# Patient Record
Sex: FEMALE | Race: BLACK OR AFRICAN AMERICAN | NOT HISPANIC OR LATINO | Employment: OTHER | URBAN - METROPOLITAN AREA
[De-identification: names, ages, dates, MRNs, and addresses within clinical notes are randomized per-mention and may not be internally consistent; named-entity substitution may affect disease eponyms.]

---

## 2017-12-30 ENCOUNTER — HOSPITAL ENCOUNTER (EMERGENCY)
Facility: HOSPITAL | Age: 62
Discharge: HOME | End: 2017-12-30
Attending: EMERGENCY MEDICINE
Payer: MEDICAID

## 2017-12-30 VITALS
OXYGEN SATURATION: 97 % | HEIGHT: 66 IN | BODY MASS INDEX: 22.34 KG/M2 | HEART RATE: 84 BPM | RESPIRATION RATE: 16 BRPM | SYSTOLIC BLOOD PRESSURE: 190 MMHG | DIASTOLIC BLOOD PRESSURE: 85 MMHG | WEIGHT: 139 LBS | TEMPERATURE: 99 F

## 2017-12-30 DIAGNOSIS — J44.1 COPD EXACERBATION: Primary | ICD-10-CM

## 2017-12-30 DIAGNOSIS — J06.9 UPPER RESPIRATORY TRACT INFECTION, UNSPECIFIED TYPE: ICD-10-CM

## 2017-12-30 LAB
FLUAV AG SPEC QL IA: NEGATIVE
FLUBV AG SPEC QL IA: NEGATIVE
SPECIMEN SOURCE: NORMAL

## 2017-12-30 PROCEDURE — 25000003 PHARM REV CODE 250: Performed by: EMERGENCY MEDICINE

## 2017-12-30 PROCEDURE — 87400 INFLUENZA A/B EACH AG IA: CPT

## 2017-12-30 PROCEDURE — 94640 AIRWAY INHALATION TREATMENT: CPT

## 2017-12-30 PROCEDURE — 25000242 PHARM REV CODE 250 ALT 637 W/ HCPCS: Performed by: EMERGENCY MEDICINE

## 2017-12-30 PROCEDURE — 99284 EMERGENCY DEPT VISIT MOD MDM: CPT | Mod: 25

## 2017-12-30 PROCEDURE — 63600175 PHARM REV CODE 636 W HCPCS: Performed by: EMERGENCY MEDICINE

## 2017-12-30 RX ORDER — ALBUTEROL SULFATE 0.83 MG/ML
2.5 SOLUTION RESPIRATORY (INHALATION)
Status: COMPLETED | OUTPATIENT
Start: 2017-12-30 | End: 2017-12-30

## 2017-12-30 RX ORDER — PREDNISONE 20 MG/1
60 TABLET ORAL DAILY
Qty: 12 TABLET | Refills: 0 | Status: SHIPPED | OUTPATIENT
Start: 2017-12-30 | End: 2018-01-03

## 2017-12-30 RX ORDER — AMLODIPINE BESYLATE 5 MG/1
10 TABLET ORAL
Status: COMPLETED | OUTPATIENT
Start: 2017-12-30 | End: 2017-12-30

## 2017-12-30 RX ORDER — AMLODIPINE BESYLATE 10 MG/1
10 TABLET ORAL DAILY
Qty: 30 TABLET | Refills: 0 | Status: SHIPPED | OUTPATIENT
Start: 2017-12-30 | End: 2018-10-27

## 2017-12-30 RX ORDER — AZITHROMYCIN 250 MG/1
250 TABLET, FILM COATED ORAL DAILY
Qty: 6 TABLET | Refills: 0 | Status: SHIPPED | OUTPATIENT
Start: 2017-12-30 | End: 2018-10-27

## 2017-12-30 RX ORDER — PREDNISONE 20 MG/1
60 TABLET ORAL
Status: COMPLETED | OUTPATIENT
Start: 2017-12-30 | End: 2017-12-30

## 2017-12-30 RX ORDER — ALBUTEROL SULFATE 90 UG/1
2 AEROSOL, METERED RESPIRATORY (INHALATION) EVERY 4 HOURS PRN
Qty: 1 INHALER | Refills: 0 | Status: SHIPPED | OUTPATIENT
Start: 2017-12-30 | End: 2018-01-15 | Stop reason: SDUPTHER

## 2017-12-30 RX ORDER — DIPHENHYDRAMINE HCL 50 MG
50 CAPSULE ORAL
Status: COMPLETED | OUTPATIENT
Start: 2017-12-30 | End: 2017-12-30

## 2017-12-30 RX ADMIN — PREDNISONE 60 MG: 20 TABLET ORAL at 04:12

## 2017-12-30 RX ADMIN — ALBUTEROL SULFATE 2.5 MG: 2.5 SOLUTION RESPIRATORY (INHALATION) at 04:12

## 2017-12-30 RX ADMIN — DIPHENHYDRAMINE HYDROCHLORIDE 50 MG: 50 CAPSULE ORAL at 04:12

## 2017-12-30 RX ADMIN — AMLODIPINE BESYLATE 10 MG: 5 TABLET ORAL at 05:12

## 2017-12-30 NOTE — ED PROVIDER NOTES
Encounter Date: 12/30/2017    SCRIBE #1 NOTE: I, Dale Yo, am scribing for, and in the presence of,  Dr. Jb Castro. I have scribed the entire note.       History     Chief Complaint   Patient presents with    Cough     coughing, body aches, congestion, and chills for 4 days.     This is a 62 y.o. female who has no past medical history on file.   The patient presents to the Emergency Department with cough and rhinorrhea.   Symptoms are associated with sore upper back.  Pt has positive sick contact  Symptoms are not relieved by dayquil or nyquil, or any other pain medicine.  Pt smokes 2 ppd.    Pt has a past surgical history that includes Hysterectomy and Wrist fracture surgery (Bilateral).        The history is provided by the patient.     Review of patient's allergies indicates:  No Known Allergies  History reviewed. No pertinent past medical history.  Past Surgical History:   Procedure Laterality Date    HYSTERECTOMY      WRIST FRACTURE SURGERY Bilateral      History reviewed. No pertinent family history.  Social History   Substance Use Topics    Smoking status: Current Every Day Smoker    Smokeless tobacco: Current User    Alcohol use No     Review of Systems   Constitutional: Negative for fever.   HENT: Positive for rhinorrhea. Negative for sore throat.    Respiratory: Positive for cough and shortness of breath.    Cardiovascular: Negative for chest pain.   Gastrointestinal: Negative for nausea.   Genitourinary: Negative for dysuria.   Musculoskeletal: Positive for back pain (upper).   Skin: Negative for rash.   Neurological: Negative for weakness.   Hematological: Does not bruise/bleed easily.       Physical Exam     Initial Vitals [12/30/17 1403]   BP Pulse Resp Temp SpO2   (!) 187/91 80 16 98.6 °F (37 °C) 96 %      MAP       123         Physical Exam    Nursing note and vitals reviewed.  Constitutional: She appears well-developed and well-nourished. She is not diaphoretic. No distress.   HENT:    Head: Normocephalic and atraumatic.   Mouth/Throat: Oropharynx is clear and moist.   Pharyngeal erythema no exudates.   Eyes: Pupils are equal, round, and reactive to light.   Neck: Normal range of motion. Neck supple.   Cardiovascular: Normal rate, regular rhythm, normal heart sounds and intact distal pulses.   No murmur heard.  Pulmonary/Chest: No respiratory distress. She has wheezes. She has no rhonchi. She has no rales.   Abdominal: She exhibits no distension.   Musculoskeletal: Normal range of motion.   Neurological: She is alert and oriented to person, place, and time.   Skin: Skin is warm and dry. Capillary refill takes less than 2 seconds.   Psychiatric: She has a normal mood and affect.         ED Course   Procedures  Labs Reviewed   INFLUENZA A AND B ANTIGEN         X-Ray Chest PA And Lateral   Final Result     See above         Electronically signed by: BJ ROBERTS DO   Date:     12/30/17   Time:    16:55                Medical Decision Making:   Clinical Tests:   Lab Tests: Ordered and Reviewed  Radiological Study: Ordered and Reviewed  ED Management:  Copd exacerbation, will place pt on prednisone, z-pack, albuterol MDI  No Evidence of pneumonia, pneumothorax, pleural effusion on chest x-ray.  I doubt that the patient has acute bacterial sinusitis or otitis media as well.      Clinical impression and plan discussed with patient.    Pt is to call for follow up with PCP in 7 days.  Pt to return to the ED for any new or concerning symptoms.  Aftercare instructions and return precautions provided to patient.   Pt expressed understanding and agrees with plan.                     ED Course as of Dec 30 1606   Sat Dec 30, 2017   1605 Influenza A Ag, EIA: Negative [NP]   1605 Influenza B Ag, EIA: Negative [NP]      ED Course User Index  [NP] Jb Castro MD     Clinical Impression:   The primary encounter diagnosis was COPD exacerbation. A diagnosis of Upper respiratory tract infection, unspecified type  was also pertinent to this visit.          I, Dr. Jb Castro, personally performed the services described in this documentation.   All medical record entries made by the scribe were at my direction and in my presence.   I have reviewed the chart and agree that the record is accurate and complete.   Jb Castro MD.                         Jb Castro MD  01/09/18 0753

## 2018-10-27 PROBLEM — I16.1 HYPERTENSIVE EMERGENCY: Status: ACTIVE | Noted: 2018-10-27

## 2018-10-27 PROBLEM — Z72.0 TOBACCO USE: Status: ACTIVE | Noted: 2018-10-27

## 2018-10-27 PROBLEM — R10.9 ABDOMINAL PAIN: Status: ACTIVE | Noted: 2018-10-27

## 2018-10-27 PROBLEM — N13.30 HYDRONEPHROSIS: Status: ACTIVE | Noted: 2018-10-27

## 2018-10-27 PROBLEM — R79.89 ELEVATED TROPONIN: Status: ACTIVE | Noted: 2018-10-27

## 2018-10-27 PROBLEM — N18.2 ACUTE RENAL FAILURE SUPERIMPOSED ON STAGE 2 CHRONIC KIDNEY DISEASE: Status: ACTIVE | Noted: 2018-10-27

## 2018-10-27 PROBLEM — N17.9 ACUTE RENAL FAILURE SUPERIMPOSED ON STAGE 2 CHRONIC KIDNEY DISEASE: Status: ACTIVE | Noted: 2018-10-27

## 2018-10-29 PROBLEM — R10.9 ABDOMINAL PAIN: Status: RESOLVED | Noted: 2018-10-27 | Resolved: 2018-10-29

## 2018-10-29 PROBLEM — N18.30 STAGE 3 CHRONIC KIDNEY DISEASE: Status: ACTIVE | Noted: 2018-10-27

## 2018-10-29 PROBLEM — I10 ESSENTIAL HYPERTENSION: Status: ACTIVE | Noted: 2018-10-29

## 2018-10-29 PROBLEM — I16.1 HYPERTENSIVE EMERGENCY: Status: RESOLVED | Noted: 2018-10-27 | Resolved: 2018-10-29

## 2019-01-02 DIAGNOSIS — M25.552 LEFT HIP PAIN: Primary | ICD-10-CM

## 2019-02-04 ENCOUNTER — TELEPHONE (OUTPATIENT)
Dept: GASTROENTEROLOGY | Facility: CLINIC | Age: 64
End: 2019-02-04

## 2019-02-06 ENCOUNTER — TELEPHONE (OUTPATIENT)
Dept: GASTROENTEROLOGY | Facility: CLINIC | Age: 64
End: 2019-02-06

## 2019-02-06 NOTE — TELEPHONE ENCOUNTER
Second attempt to contact patient in regards to scheduling a colonoscopy. Left message to call back.

## 2019-05-23 ENCOUNTER — TELEPHONE (OUTPATIENT)
Dept: GASTROENTEROLOGY | Facility: CLINIC | Age: 64
End: 2019-05-23

## 2019-05-23 NOTE — TELEPHONE ENCOUNTER
[facility]   [address] [place] [address]  [phone]    Date : 5/23/19    Dear : Briseyda Rojas     An order for the following procedure(s) colonoscopy was placed for you by Yung    Please call the number listed to schedule this procedure or cancel the order.    If you have already scheduled this appointment, please disregard this letter.      Sincerely,    [facility]  [phone]

## 2019-09-25 ENCOUNTER — TELEPHONE (OUTPATIENT)
Dept: RADIOLOGY | Facility: HOSPITAL | Age: 64
End: 2019-09-25

## 2019-09-25 NOTE — TELEPHONE ENCOUNTER
Spoke with patient and her sister Tonia. Reviewed breast biopsy procedure and reviewed instructions for breast biopsy. Patient's sister Tonia expressed understanding and all questions were answered. Provided patient's sister with my phone number to call for any further concerns or questions.   Patient scheduled breast biopsy at the [facility] on 10/4/2019.

## 2019-09-25 NOTE — TELEPHONE ENCOUNTER
----- Message from Ying Chapin sent at 9/25/2019  2:03 PM CDT -----  Contact: Pt  Pt was returning a phone call. Please call pt back at [phone]

## 2019-09-25 NOTE — TELEPHONE ENCOUNTER
Called patient's sister Tonia as requested in reference to her breast imaging and scheduling a breast biopsy. No answer. Left the patient's sister a message with my direct phone number.

## 2019-09-25 NOTE — TELEPHONE ENCOUNTER
----- Message from Natasha Ernst RT sent at 9/25/2019  7:52 AM CDT -----  Regarding: PATIENT FOR BIOPSY  Arpit,    Good morning.  Ms. Rojas was recommended by Dr Librado Barnes to have some areas of right breast biopsied. One biopsy by ultrasound of a lymph node and possible 2 stereotactic biopsies.  He consulted with one of the radiologist at [facility].  Her provider faxed a referral to [facility]. Please look at that and see if the is sufficient for an order.  Please let me know if you need something different.  Her sister would like to be contacted to set up [employer] [facility] [place] at [phone].     Thanks.  Natasha

## 2019-10-04 ENCOUNTER — HOSPITAL ENCOUNTER (OUTPATIENT)
Dept: RADIOLOGY | Facility: HOSPITAL | Age: 64
Discharge: HOME | End: 2019-10-04
Attending: NURSE PRACTITIONER
Payer: MEDICAID

## 2019-10-04 DIAGNOSIS — R92.8 ABNORMAL FINDING ON BREAST IMAGING: ICD-10-CM

## 2019-10-04 DIAGNOSIS — N63.0 BREAST MASS: Primary | ICD-10-CM

## 2019-10-04 PROCEDURE — 19082 BX BREAST ADD LESION STRTCTC: CPT | Mod: PO

## 2019-10-04 PROCEDURE — 38505 NEEDLE BIOPSY LYMPH NODES: CPT | Mod: 51,RT,, | Performed by: RADIOLOGY

## 2019-10-04 PROCEDURE — 19081 BX BREAST 1ST LESION STRTCTC: CPT | Mod: PO,RT

## 2019-10-04 PROCEDURE — 27201068 US BREAST BIOPSY WITH IMAGING 1ST SITE RIGHT: Mod: PO

## 2019-10-04 PROCEDURE — 38505 PR NEEDLE BIOPSY, LYMPH 2DE(S): ICD-10-PCS | Mod: 51,RT,, | Performed by: RADIOLOGY

## 2019-10-04 PROCEDURE — 19081 MAMMO BREAST STEREOTACTIC BREAST BIOPSY RIGHT: ICD-10-PCS | Mod: RT,,, | Performed by: RADIOLOGY

## 2019-10-04 PROCEDURE — 88342 IMHCHEM/IMCYTCHM 1ST ANTB: CPT | Mod: 26,59,, | Performed by: PATHOLOGY

## 2019-10-04 PROCEDURE — 88341 IMHCHEM/IMCYTCHM EA ADD ANTB: CPT | Mod: 26,59,, | Performed by: PATHOLOGY

## 2019-10-04 PROCEDURE — 88360 TUMOR IMMUNOHISTOCHEM/MANUAL: CPT | Mod: 26,,, | Performed by: PATHOLOGY

## 2019-10-04 PROCEDURE — 25000003 PHARM REV CODE 250: Mod: PO | Performed by: NURSE PRACTITIONER

## 2019-10-04 PROCEDURE — 88305 TISSUE SPECIMEN TO PATHOLOGY, RADIOLOGY: ICD-10-PCS | Mod: 26,,, | Performed by: PATHOLOGY

## 2019-10-04 PROCEDURE — 19081 BX BREAST 1ST LESION STRTCTC: CPT | Mod: RT,,, | Performed by: RADIOLOGY

## 2019-10-04 PROCEDURE — 88360 TUMOR IMMUNOHISTOCHEM/MANUAL: CPT | Mod: 59 | Performed by: PATHOLOGY

## 2019-10-04 PROCEDURE — 19083 US BREAST BIOPSY WITH IMAGING 1ST SITE RIGHT: ICD-10-PCS | Mod: RT,,, | Performed by: RADIOLOGY

## 2019-10-04 PROCEDURE — 88341 TISSUE SPECIMEN TO PATHOLOGY, RADIOLOGY: ICD-10-PCS | Mod: 26,59,, | Performed by: PATHOLOGY

## 2019-10-04 PROCEDURE — 88305 TISSUE EXAM BY PATHOLOGIST: CPT | Mod: 26,,, | Performed by: PATHOLOGY

## 2019-10-04 PROCEDURE — 19083 BX BREAST 1ST LESION US IMAG: CPT | Mod: RT,,, | Performed by: RADIOLOGY

## 2019-10-04 PROCEDURE — 88360 TISSUE SPECIMEN TO PATHOLOGY, RADIOLOGY: ICD-10-PCS | Mod: 26,,, | Performed by: PATHOLOGY

## 2019-10-04 PROCEDURE — 88342 TISSUE SPECIMEN TO PATHOLOGY, RADIOLOGY: ICD-10-PCS | Mod: 26,59,, | Performed by: PATHOLOGY

## 2019-10-04 RX ORDER — LIDOCAINE HYDROCHLORIDE AND EPINEPHRINE 20; 10 MG/ML; UG/ML
20 INJECTION, SOLUTION INFILTRATION; PERINEURAL ONCE
Status: COMPLETED | OUTPATIENT
Start: 2019-10-04 | End: 2019-10-04

## 2019-10-04 RX ORDER — LIDOCAINE HYDROCHLORIDE 10 MG/ML
2 INJECTION, SOLUTION EPIDURAL; INFILTRATION; INTRACAUDAL; PERINEURAL ONCE
Status: COMPLETED | OUTPATIENT
Start: 2019-10-04 | End: 2019-10-04

## 2019-10-04 RX ORDER — LIDOCAINE HYDROCHLORIDE 10 MG/ML
2.5 INJECTION, SOLUTION EPIDURAL; INFILTRATION; INTRACAUDAL; PERINEURAL ONCE
Status: DISCONTINUED | OUTPATIENT
Start: 2019-10-04 | End: 2019-10-05 | Stop reason: HOSPADM

## 2019-10-04 RX ORDER — LIDOCAINE HYDROCHLORIDE 10 MG/ML
3 INJECTION, SOLUTION EPIDURAL; INFILTRATION; INTRACAUDAL; PERINEURAL ONCE
Status: COMPLETED | OUTPATIENT
Start: 2019-10-04 | End: 2019-10-04

## 2019-10-04 RX ADMIN — LIDOCAINE HYDROCHLORIDE 30 MG: 10 INJECTION, SOLUTION EPIDURAL; INFILTRATION; INTRACAUDAL; PERINEURAL at 03:10

## 2019-10-04 RX ADMIN — LIDOCAINE HYDROCHLORIDE AND EPINEPHRINE 20 ML: 20; 10 INJECTION, SOLUTION INFILTRATION; PERINEURAL at 02:10

## 2019-10-04 RX ADMIN — LIDOCAINE HYDROCHLORIDE AND EPINEPHRINE 20 ML: 20; 10 INJECTION, SOLUTION INFILTRATION; PERINEURAL at 03:10

## 2019-10-04 RX ADMIN — LIDOCAINE HYDROCHLORIDE 20 MG: 10 INJECTION, SOLUTION EPIDURAL; INFILTRATION; INTRACAUDAL; PERINEURAL at 02:10

## 2019-10-10 ENCOUNTER — TELEPHONE (OUTPATIENT)
Dept: SURGERY | Facility: CLINIC | Age: 64
End: 2019-10-10

## 2019-10-10 NOTE — TELEPHONE ENCOUNTER
MA returned call, pt was notified of diagnoses by NP. Asked if patient was scheduled to see a surgeon, MA was unsure. Offered to schedule appt for pt. MA stated she would discuss with NP and return my call

## 2019-10-15 ENCOUNTER — TELEPHONE (OUTPATIENT)
Dept: SURGERY | Facility: CLINIC | Age: 64
End: 2019-10-15

## 2019-10-15 ENCOUNTER — OFFICE VISIT (OUTPATIENT)
Dept: SURGERY | Facility: CLINIC | Age: 64
End: 2019-10-15
Payer: MEDICAID

## 2019-10-15 VITALS
SYSTOLIC BLOOD PRESSURE: 128 MMHG | WEIGHT: 130.31 LBS | HEART RATE: 65 BPM | TEMPERATURE: 99 F | DIASTOLIC BLOOD PRESSURE: 70 MMHG | OXYGEN SATURATION: 99 % | BODY MASS INDEX: 20.94 KG/M2 | HEIGHT: 66 IN

## 2019-10-15 DIAGNOSIS — Z17.1 MALIGNANT NEOPLASM OF RIGHT BREAST IN FEMALE, ESTROGEN RECEPTOR NEGATIVE, UNSPECIFIED SITE OF BREAST: Primary | ICD-10-CM

## 2019-10-15 DIAGNOSIS — C50.911 MALIGNANT NEOPLASM OF RIGHT BREAST IN FEMALE, ESTROGEN RECEPTOR NEGATIVE, UNSPECIFIED SITE OF BREAST: Primary | ICD-10-CM

## 2019-10-15 PROCEDURE — 99204 PR OFFICE/OUTPT VISIT, NEW, LEVL IV, 45-59 MIN: ICD-10-PCS | Mod: S$PBB,,, | Performed by: SURGERY

## 2019-10-15 PROCEDURE — 99999 PR PBB SHADOW E&M-EST. PATIENT-LVL III: CPT | Mod: PBBFAC,,, | Performed by: SURGERY

## 2019-10-15 PROCEDURE — 99213 OFFICE O/P EST LOW 20 MIN: CPT | Mod: PBBFAC,PN | Performed by: SURGERY

## 2019-10-15 PROCEDURE — 99999 PR PBB SHADOW E&M-EST. PATIENT-LVL III: ICD-10-PCS | Mod: PBBFAC,,, | Performed by: SURGERY

## 2019-10-15 PROCEDURE — 99204 OFFICE O/P NEW MOD 45 MIN: CPT | Mod: S$PBB,,, | Performed by: SURGERY

## 2019-10-15 RX ORDER — PHENOL/SODIUM PHENOLATE
AEROSOL, SPRAY (ML) MUCOUS MEMBRANE
COMMUNITY
End: 2019-10-29 | Stop reason: ALTCHOICE

## 2019-10-15 RX ORDER — METHOCARBAMOL 750 MG/1
TABLET, FILM COATED ORAL
COMMUNITY
End: 2019-10-29 | Stop reason: ALTCHOICE

## 2019-10-15 RX ORDER — CLARITHROMYCIN 500 MG/1
TABLET, FILM COATED ORAL
COMMUNITY
End: 2019-10-29 | Stop reason: ALTCHOICE

## 2019-10-15 RX ORDER — AMOXICILLIN 500 MG/1
TABLET, FILM COATED ORAL
COMMUNITY
End: 2019-10-29

## 2019-10-15 RX ORDER — KETOROLAC TROMETHAMINE 10 MG/1
10 TABLET, FILM COATED ORAL EVERY 6 HOURS PRN
Qty: 20 TABLET | Refills: 0 | Status: SHIPPED | OUTPATIENT
Start: 2019-10-15 | End: 2019-10-20

## 2019-10-15 RX ORDER — IBUPROFEN 800 MG/1
TABLET ORAL
COMMUNITY
End: 2019-10-29

## 2019-10-15 RX ORDER — METHYLPREDNISOLONE 4 MG/1
TABLET ORAL
COMMUNITY
End: 2019-10-29 | Stop reason: ALTCHOICE

## 2019-10-15 NOTE — LETTER
October 15, 2019      [place]  [address] [facility] [address]           [facility]  [address], [facility] [address]  [facility] [address]  Phone: [phone]  Fax: [phone]          Patient: Briseyda Rojas   MR Number: [id number]   YOB: 1955   Date of Visit: 10/15/2019       Dear Adina Wyatt:    Thank you for referring Briseyda Rojas to me for evaluation. Attached you will find relevant portions of my assessment and plan of care.    If you have questions, please do not hesitate to call me. I look forward to following Briseyda Rojas along with you.    Sincerely,    Karig Kahn Jr., MD    Enclosure  CC:  No Recipients    If you would like to receive this communication electronically, please contact [email or web address] or [phone] to request more information on A2B Link access.    For providers and/or their staff who would like to refer a patient to [facility], please contact us through our one-stop-shop provider referral line, [facility] , at [phone].    If you feel you have received this communication in error or would no longer like to receive these types of communications, please e-mail [email or web address]

## 2019-10-15 NOTE — TELEPHONE ENCOUNTER
----- Message from Danay Lovett sent at 10/15/2019  2:39 PM CDT -----  Contact:  Torin - [phone]  Needs a call back about his wife medication he do not know the name. Please advise           10/15/2019        1646  Contacted Mr. Rojas regarding the above message. He stated that he had already received the information he needed. Patient's  verbalized understanding.

## 2019-10-15 NOTE — H&P
"[facility]  OUTPATIENT H&P    REASON FOR VISIT/CC:  Right breast cancer    HPI: Briseyda Rojas is a 63 y.o. female who presents for evaluation of recently diagnosed right breast cancer.  She underwent mammography and ultrasound in early September of this year which showed architectural distortion in the right upper lateral breast and pleomorphic calcifications in the right upper mid breast.  Ultrasound of the axilla was also performed which showed a prominent lymph node.  She underwent biopsy of all 3 lesions. The right upper lateral lesion returned as DCIS with comedo necrosis.  The right upper mid breast came back as normal but had discoordination between imaging.  The right axillary lymph node returned as intraductal carcinoma, ER negative, HI negative, HER2 equivocal with fish pending.    Patient complains of soreness and pain in the right axilla at the area of biopsy.  Denies fevers chills.    History of previous right breast biopsy for a "cyst" per the patient. Reports she has undergone previous mammography which were all negative however no recent imaging is available in epic or scanned into the media tab.    Patient reports strong family history of breast cancer the details are uncertain, she reports her grandmother (unknown uncertain if maternal or paternal), mother and father all had breast cancer.  Uncertain of the ages but all were above 50.  All are since .  Denies history of ovarian cancer.  Has 2 sons, no daughters.    I have reviewed the patient's chart including prior progress notes, procedures and testing. The patient has hypertension and hyperlipidemia.    ROS:   Review of Systems   Constitutional: Negative for activity change, chills and fever.   HENT: Negative for congestion, nosebleeds and trouble swallowing.    Eyes: Negative for photophobia, discharge and visual disturbance.   Respiratory: Negative for apnea, chest tightness and shortness of breath.    Cardiovascular: " Negative for chest pain, palpitations and leg swelling.   Gastrointestinal: Negative for abdominal distention, abdominal pain, nausea and vomiting.   Genitourinary: Negative for difficulty urinating, dysuria and hematuria.   Musculoskeletal: Negative for arthralgias, gait problem, neck pain and neck stiffness.   Skin: Negative for color change, pallor, rash and wound.        Pain in the right axilla and right lateral breast   Neurological: Negative for seizures, syncope and light-headedness.   Psychiatric/Behavioral: Negative for agitation, behavioral problems and confusion.       PROBLEM LIST:  Patient Active Problem List   Diagnosis    Elevated troponin    Hydronephrosis    Tobacco use    Stage 3 chronic kidney disease    Essential hypertension         HISTORY  Past Medical History:   Diagnosis Date    Hypertension        Past Surgical History:   Procedure Laterality Date    HYSTERECTOMY      WRIST FRACTURE SURGERY Bilateral        Social History     Tobacco Use    Smoking status: Current Every Day Smoker     Packs/day: 1.00    Smokeless tobacco: Current User   Substance Use Topics    Alcohol use: No    Drug use: No       Family History   Problem Relation Age of Onset    Ovarian cancer Mother     Breast cancer Maternal Grandmother          MEDS:  Current Outpatient Medications on File Prior to Visit   Medication Sig Dispense Refill    amoxicillin (AMOXIL) 500 MG Tab amoxicillin 500 mg tablet   Take 2 tablets twice a day by oral route for 14 days.      aspirin 81 MG Chew Take 1 tablet (81 mg total) by mouth once daily.  0    atorvastatin (LIPITOR) 40 MG tablet Take 1 tablet (40 mg total) by mouth every evening. 30 tablet 1    carvedilol (COREG) 12.5 MG tablet Take 1 tablet (12.5 mg total) by mouth 2 (two) times daily. 60 tablet 1    clarithromycin (BIAXIN) 500 MG tablet clarithromycin 500 mg tablet   Take 1 tablet every 12 hours by oral route for 14 days.      ibuprofen (ADVIL,MOTRIN) 800 MG  tablet ibuprofen 800 mg tablet   Take 1 tablet twice a day by oral route as needed.      lisinopril-hydrochlorothiazide (PRINZIDE,ZESTORETIC) 20-25 mg Tab Take 1 tablet by mouth once daily. 30 tablet 1    methocarbamol (ROBAXIN) 750 MG Tab methocarbamol 750 mg tablet   Take 1 tablet 3 times a day by oral route as needed.      methylPREDNISolone (MEDROL, CARLOS,) 4 mg tablet Medrol (Carlos) 4 mg tablets in a dose pack   as directed      omeprazole 20 mg TbEC omeprazole 20 mg tablet,delayed release   Take 1 tablet twice a day by oral route for 14 days.      diclofenac (VOLTAREN) 25 MG TbEC Take 1 tablet (25 mg total) by mouth 3 (three) times daily. (Patient not taking: Reported on 10/15/2019) 15 tablet 0    HYDROcodone-acetaminophen (NORCO) 5-325 mg per tablet Take 1 tablet by mouth every 4 (four) hours as needed for Pain. (Patient not taking: Reported on 10/15/2019) 18 tablet 0     No current facility-administered medications on file prior to visit.        ALLERGIES:  Review of patient's allergies indicates:  No Known Allergies      VITALS:  Vitals:    10/15/19 1324   BP: 128/70   Pulse: 65   Temp: 98.5 °F (36.9 °C)         PHYSICAL EXAM:  Physical Exam   Constitutional: She is oriented to person, place, and time. She appears well-developed and well-nourished. No distress.   HENT:   Head: Normocephalic and atraumatic.   Nose: Nose normal.   Eyes: Conjunctivae and EOM are normal. No scleral icterus.   Neck: Normal range of motion. Neck supple. No tracheal tenderness present. No tracheal deviation present.   Cardiovascular: Normal rate, regular rhythm and intact distal pulses.   Pulmonary/Chest: Effort normal and breath sounds normal. No accessory muscle usage or stridor. No respiratory distress. She exhibits no mass, no edema and no deformity. Right breast exhibits tenderness. Right breast exhibits no inverted nipple, no mass, no nipple discharge and no skin change. Left breast exhibits no inverted nipple, no mass, no  nipple discharge and no skin change.       Abdominal: Soft. Normal appearance. She exhibits no distension, no ascites and no mass. There is no tenderness. There is no rebound. No hernia.   Musculoskeletal: Normal range of motion. She exhibits no edema or deformity.   Lymphadenopathy:        Right cervical: No superficial cervical adenopathy present.       Left cervical: No superficial cervical adenopathy present.     She has axillary adenopathy.        Right axillary: Pectoral adenopathy present.        Left axillary: No pectoral and no lateral adenopathy present.       Right: No supraclavicular adenopathy present.        Left: No supraclavicular adenopathy present.   Neurological: She is alert and oriented to person, place, and time. She exhibits normal muscle tone.   Skin: Skin is warm and dry. No rash noted. She is not diaphoretic. No erythema.   Psychiatric: She has a normal mood and affect. Her behavior is normal. Judgment and thought content normal.   Vitals reviewed.        LABS:  Lab Results   Component Value Date    WBC 6.27 09/06/2019    RBC 4.51 09/06/2019    HGB 12.8 09/06/2019    HCT 39.3 09/06/2019     09/06/2019     Lab Results   Component Value Date     (H) 09/06/2019     09/06/2019    K 3.7 09/06/2019     09/06/2019    CO2 28 09/06/2019    BUN 23 (H) 09/06/2019    CREATININE 1.14 09/06/2019    CALCIUM 9.3 09/06/2019     Lab Results   Component Value Date    ALT 22 09/06/2019    AST 26 09/06/2019    ALKPHOS 70 09/06/2019    BILITOT 0.3 09/06/2019     No results found for: MG, PHOS    STUDIES:  All images and reports were personally reviewed.      IMAGING:  Left  US Breast Bilateral Limited  There is a 5 mm complicated cyst seen in the left breast at 11 o'clock.      Right     Mammo Digital Diagnostic Bilat w/ Joaquin  There is 33 mm architectural distortion seen in the upper outer quadrant of the right breast, 46 cm from the nipple.      There is an area measuring at least 81  mm of fine pleomorphic calcifications seen in the upper medial and lateral regions of the right breast, 46 cm from the nipple.      There are no corresponding lymph nodes seen on this modality.      US Breast Bilateral Limited  There is a 67 mm x 41 mm x 72 mm lymph node seen in the right axilla.      There are no corresponding calcifications seen on this modality.      Impression:  Right  Calcifications: Right breast 81 mm calcifications at the upper position. Assessment: 5 - Highly suggestive of malignancy. Stereotactic/Baylee Biopsy is recommended.   Architectural Distortion: Right breast 33 mm architectural distortion at the upper outer position. Assessment: 5 - Highly suggestive of malignancy. Stereotactic/baylee Biopsy is recommended.   Lymph Node: Right axilla 67 mm x 41 mm x 72 mm lymph node. Assessment: 5 - Highly suggestive of malignancy. Ultrasound-guided biopsy is recommended.      Left  There is no mammographic or sonographic evidence of malignancy in the left breast.     BI-RADS Category:   Overall: 5 - Highly Suggestive of Malignancy      PATHOLOGY:  FINAL PATHOLOGIC DIAGNOSIS  1. Right breast, 12 o'clock calcifications, stereotactic biopsy:  Fibroadipose tissue with scant benign breast elements.  Microcalcifications are identified in association with benign breast tissue.  Negative for atypia or malignancy.    -  The pathology results are benign however disconcordant to the imaging findings. Please schedule with Breast Surgery.  Thank you.    2. Right breast, upper inner calcifications, stereotactic biopsy:  Ductal carcinoma in situ (DCIS), high nuclear grade, cribriform type with apocrine features and comedonecrosis.  Microcalcifications are identified in association with DCIS.  No invasive carcinoma is identified.  Comment: Multiple foci of high-grade DCIS are identified with the largest single continuous focus measuring 6 mm.  DCIS pulmonary receptor markers are in progress and will be reported in an  addendum.    -   The breast biopsy results are malignant and concordant.  Please schedule with breast surgery.    3. Right axillary lymph node, biopsy:  Invasive ductal carcinoma, likely metastatic, [place] histologic grade 3 (tubular formation: 3, nuclear  pleomorphism: 3, mitotic activity:    THE DIAGNOSIS REMAINS THE SAME. This supplemental is issued to report the results of biomarker/receptor  studies.  2. Right breast, upper inner calcifications  ER: Negative  NV: Negative  3. Right axillary lymph node  ER: Negative  NV: Negative  Her2: Equivocal (score 2)  Ket2ELBA: Pending  Ki-67: 40%, unfavorable    ASSESSMENT & PLAN:  63 y.o. female with right breast invasive ductal carcinoma  - clinically positive lymph node with biopsy-proven invasive ductal carcinoma  - 2 separate breast lesions on the right side, possibly multicentric disease  - will refer this case to [facility] to be discussed by multi disciplinary board to determine next best course of action  - anti-inflammatory to be called in to patient's pharmacy for postprocedural pain

## 2019-10-16 ENCOUNTER — TELEPHONE (OUTPATIENT)
Dept: SURGERY | Facility: CLINIC | Age: 64
End: 2019-10-16

## 2019-10-16 NOTE — TELEPHONE ENCOUNTER
Spoke with pt's , offered appt with Dr. Garcia tomorrow, they cannot make that appt. Scheduled for 10/22/2019 with Dr. Garcia. Reviewed location, time and date with . He verbalized understanding  ----- Message from Stephani Power MA sent at 10/16/2019 10:20 AM CDT -----      ----- Message -----  From: Sarai Jang MA  Sent: 10/15/2019   3:25 PM CDT  To: Stephani Power MA    Rt Breast Cancer  ----- Message -----  From: Stephani Power MA  Sent: 10/15/2019   2:12 PM CDT  To: Sarai Jang MA    What will she need to be seen for?    I'm happy to help,  Stephani MOELLER  ----- Message -----  From: Sarai Jang MA  Sent: 10/15/2019   1:53 PM CDT  To: [facility] Staff    Dr. Kahn will be sending Dr. Garcia information on this pt. Could you please assist in getting an appt for her?    Thanks In Advance      Sarai

## 2019-10-17 ENCOUNTER — TELEPHONE (OUTPATIENT)
Dept: SURGERY | Facility: CLINIC | Age: 64
End: 2019-10-17

## 2019-10-22 ENCOUNTER — TELEPHONE (OUTPATIENT)
Dept: SURGERY | Facility: CLINIC | Age: 64
End: 2019-10-22

## 2019-10-22 ENCOUNTER — OFFICE VISIT (OUTPATIENT)
Dept: SURGERY | Facility: CLINIC | Age: 64
End: 2019-10-22
Payer: MEDICAID

## 2019-10-22 ENCOUNTER — DOCUMENTATION ONLY (OUTPATIENT)
Dept: SURGERY | Facility: CLINIC | Age: 64
End: 2019-10-22

## 2019-10-22 VITALS
HEIGHT: 66 IN | DIASTOLIC BLOOD PRESSURE: 77 MMHG | BODY MASS INDEX: 21.44 KG/M2 | HEART RATE: 78 BPM | WEIGHT: 133.38 LBS | SYSTOLIC BLOOD PRESSURE: 154 MMHG

## 2019-10-22 VITALS
WEIGHT: 133.38 LBS | HEART RATE: 64 BPM | DIASTOLIC BLOOD PRESSURE: 77 MMHG | SYSTOLIC BLOOD PRESSURE: 154 MMHG | HEIGHT: 66 IN | BODY MASS INDEX: 21.44 KG/M2

## 2019-10-22 DIAGNOSIS — C50.611 CARCINOMA OF AXILLARY TAIL OF RIGHT BREAST IN FEMALE, ESTROGEN RECEPTOR NEGATIVE: ICD-10-CM

## 2019-10-22 DIAGNOSIS — Z17.1 MALIGNANT NEOPLASM OF RIGHT BREAST IN FEMALE, ESTROGEN RECEPTOR NEGATIVE, UNSPECIFIED SITE OF BREAST: Primary | ICD-10-CM

## 2019-10-22 DIAGNOSIS — C50.919 BREAST CANCER: Primary | ICD-10-CM

## 2019-10-22 DIAGNOSIS — Z17.1 CARCINOMA OF AXILLARY TAIL OF RIGHT BREAST IN FEMALE, ESTROGEN RECEPTOR NEGATIVE: ICD-10-CM

## 2019-10-22 DIAGNOSIS — C50.911 BREAST CANCER, RIGHT BREAST: ICD-10-CM

## 2019-10-22 DIAGNOSIS — C50.411 MALIGNANT NEOPLASM OF UPPER-OUTER QUADRANT OF RIGHT FEMALE BREAST, UNSPECIFIED ESTROGEN RECEPTOR STATUS: Primary | ICD-10-CM

## 2019-10-22 DIAGNOSIS — C50.911 MALIGNANT NEOPLASM OF RIGHT BREAST IN FEMALE, ESTROGEN RECEPTOR NEGATIVE, UNSPECIFIED SITE OF BREAST: Primary | ICD-10-CM

## 2019-10-22 PROCEDURE — 99205 PR OFFICE/OUTPT VISIT, NEW, LEVL V, 60-74 MIN: ICD-10-PCS | Mod: S$PBB,,, | Performed by: INTERNAL MEDICINE

## 2019-10-22 PROCEDURE — 99213 OFFICE O/P EST LOW 20 MIN: CPT | Mod: PBBFAC,27 | Performed by: SURGERY

## 2019-10-22 PROCEDURE — 99215 PR OFFICE/OUTPT VISIT, EST, LEVL V, 40-54 MIN: ICD-10-PCS | Mod: S$PBB,,, | Performed by: SURGERY

## 2019-10-22 PROCEDURE — 99215 OFFICE O/P EST HI 40 MIN: CPT | Mod: S$PBB,,, | Performed by: SURGERY

## 2019-10-22 PROCEDURE — 99999 PR PBB SHADOW E&M-EST. PATIENT-LVL III: ICD-10-PCS | Mod: PBBFAC,,, | Performed by: SURGERY

## 2019-10-22 PROCEDURE — 99205 OFFICE O/P NEW HI 60 MIN: CPT | Mod: S$PBB,,, | Performed by: INTERNAL MEDICINE

## 2019-10-22 PROCEDURE — 99213 OFFICE O/P EST LOW 20 MIN: CPT | Mod: PBBFAC | Performed by: INTERNAL MEDICINE

## 2019-10-22 PROCEDURE — 99999 PR PBB SHADOW E&M-EST. PATIENT-LVL III: ICD-10-PCS | Mod: PBBFAC,,, | Performed by: INTERNAL MEDICINE

## 2019-10-22 PROCEDURE — 99999 PR PBB SHADOW E&M-EST. PATIENT-LVL III: CPT | Mod: PBBFAC,,, | Performed by: SURGERY

## 2019-10-22 PROCEDURE — 99999 PR PBB SHADOW E&M-EST. PATIENT-LVL III: CPT | Mod: PBBFAC,,, | Performed by: INTERNAL MEDICINE

## 2019-10-22 RX ORDER — SODIUM CHLORIDE 9 MG/ML
INJECTION, SOLUTION INTRAVENOUS CONTINUOUS
Status: CANCELLED | OUTPATIENT
Start: 2019-10-22

## 2019-10-22 NOTE — LETTER
November 17, 2019      Kraig Kahn Jr., MD  [address] [place] [address]           [facility]  [address]  [facility] [address]  Phone: [phone]  Fax: [phone]          Patient: Briseyda Rojas   MR Number: [id number]   YOB: 1955   Date of Visit: 10/22/2019       Dear Dr. Kraig Kahn Jr.:    Thank you for referring Briseyda Rojas to me for evaluation. Attached you will find relevant portions of my assessment and plan of care.    If you have questions, please do not hesitate to call me. I look forward to following Briseyda Rojas along with you.    Sincerely,    Mireille Raya MD    Enclosure  CC:  No Recipients    If you would like to receive this communication electronically, please contact [email or web address] or [phone] to request more information on tabulate Link access.    For providers and/or their staff who would like to refer a patient to [facility], please contact us through our one-stop-shop provider referral line, [facility], at [phone].    If you feel you have received this communication in error or would no longer like to receive these types of communications, please e-mail [email or web address]

## 2019-10-22 NOTE — PROGRESS NOTES
Nurse Navigator Note:     Met with patient during her consult with Dr. Raya.  Patient and I reviewed the information she discussed with Dr. Raya, including treatment options, diagnosis, and future plans for workup. Patient and I went through the new patient binder, explained some of the information and why it is provided.     Also offered patient consults with our other specialty clinics: Dr. Patricia for gynecological health during treatment, our breast physical therapy department for pre-op and post-operative assessments, Dr. Monk for psychological support, and [facility] Justice for nutritional counseling. Explained to patient that all of these support services are completely optional. Discussed that physical therapy may call patient to offer pre-op appt, and what that appt would entail.     Patient was given a copy of her appointments, Dr. Raya's card, and my card. Encouraged her to call me if she has any questions or concerns or would like to schedule any additional appointments. Verbalized understanding of all information.     Spent a total of one hour with pt and family member. Explained that insurance will end on 10/31/2019. Instructed them both to go to the financial assistance area. Explained that they can file for financial assistance and there are people that can assist them with completing a medicaid application to avoid her insurance from lapsing. All numbers for financial assistance as well as medicaid transportation provided to pt and family member. I stressed that they must schedule transportation several days before the scheduled appt.   Pt and family member verbalized understanding. Genetic, blood work and MRI all scheduled for patient. All upcoming appts and where to go for financial assistance provided to pt. Pt asked for a social work, I explained that I would message social work and have them reach out to them.

## 2019-10-22 NOTE — TELEPHONE ENCOUNTER
Spoke with spouse in regards to patient's appointment today at 1100 with Dr. Garcia. Spouse states they were having car trouble, but should make it to the appointment. I offered to reschedule appointment for Thursday, but spouse insisted they would make it today.

## 2019-10-22 NOTE — PROGRESS NOTES
New Breast Cancer  History and Physical  [facility]  Department of Surgery    REFERRING PROVIDER: Kraig Kahn Jr., MD  [address] [place] [address]    CHIEF COMPLAINT: right breast cancer    Subjective:      Briseyda Rojas is a 63 y.o. postmenopausal female referred for evaluation of recently diagnosed carcinoma of the right breast. The patient was initially referred for surgical evaluation of an axillary lump on exam first noted several months ago.  She does report that is has gotten larger over time. 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    This is her first mammogram ever.        Patient does routinely do self breast exams.  Patient has noted a change on breast exam.  Patient denies nipple discharge. Patient denies to previous breast biopsy. Patient denies a personal history of breast cancer.    Diagnostic imagin2019  Left  US Breast Bilateral Limited  There is a 5 mm complicated cyst seen in the left breast at 11 o'clock.      Right     Mammo Digital Diagnostic Bilat w/ Joaquin  There is 33 mm architectural distortion seen in the upper outer quadrant of the right breast, 46 cm from the nipple.      There is an area measuring at least 81 mm of fine pleomorphic calcifications seen in the upper medial and lateral regions of the right breast, 46 cm from the nipple.      There are no corresponding lymph nodes seen on this modality.      US Breast Bilateral Limited  There is a 67 mm x 41 mm x 72 mm lymph node seen in the right axilla.  (     There are no corresponding calcifications seen on this modality.      Impression:  Right  Calcifications: Right breast 81 mm calcifications at the upper position. Assessment: 5 - Highly suggestive of malignancy. Stereotactic/Joaquin Biopsy is recommended.   Architectural Distortion: Right  breast 33 mm architectural distortion at the upper outer position. Assessment: 5 - Highly suggestive of malignancy. Stereotactic/baylee Biopsy is recommended.   Lymph Node: Right axilla 67 mm x 41 mm x 72 mm lymph node. Assessment: 5 - Highly suggestive of malignancy. Ultrasound-guided biopsy is recommended.      Left  There is no mammographic or sonographic evidence of malignancy in the left breast.     BI-RADS Category:   Overall: 5 - Highly Suggestive of Malignancy     Recommendation:  Stereotactic Biopsy is recommended.  Stereotactic Biopsy is recommended.  Ultrasound-guided biopsy is recommended.    Pathology:  FINAL PATHOLOGIC DIAGNOSIS  1. Right breast, 12 o'clock calcifications, stereotactic biopsy:  Fibroadipose tissue with scant benign breast elements.  Microcalcifications are identified in association with benign breast tissue.  Negative for atypia or malignancy.    -  The pathology results are benign however disconcordant to the imaging findings. Please schedule with Breast Surgery.  Thank you.    2. Right breast, upper inner calcifications, stereotactic biopsy:  Ductal carcinoma in situ (DCIS), high nuclear grade, cribriform type with apocrine features and comedonecrosis.  Microcalcifications are identified in association with DCIS.  No invasive carcinoma is identified.  Comment: Multiple foci of high-grade DCIS are identified with the largest single continuous focus measuring 6 mm.  DCIS pulmonary receptor markers are in progress and will be reported in an addendum.    -   The breast biopsy results are malignant and concordant.  Please schedule with breast surgery.    3. Right axillary lymph node, biopsy:  Invasive ductal carcinoma, likely metastatic, Herrera histologic grade 3 (tubular formation: 3, nuclear  pleomorphism: 3, mitotic activity:    Triple negative (FISH negative)    Has 2 sons.        FAMILY History:  Mother ovarian cancer?  MGM breast cancer  Father unknown cancer  Sister unknown  cancer  Brother lung cancer  Sister throat cancer    Past Medical History:   Diagnosis Date    Hypertension      Past Surgical History:   Procedure Laterality Date    HYSTERECTOMY      WRIST FRACTURE SURGERY Bilateral      Current Outpatient Medications on File Prior to Visit   Medication Sig Dispense Refill    amoxicillin (AMOXIL) 500 MG Tab amoxicillin 500 mg tablet   Take 2 tablets twice a day by oral route for 14 days.      aspirin 81 MG Chew Take 1 tablet (81 mg total) by mouth once daily.  0    carvedilol (COREG) 12.5 MG tablet Take 1 tablet (12.5 mg total) by mouth 2 (two) times daily. 60 tablet 1    clarithromycin (BIAXIN) 500 MG tablet clarithromycin 500 mg tablet   Take 1 tablet every 12 hours by oral route for 14 days.      diclofenac (VOLTAREN) 25 MG TbEC Take 1 tablet (25 mg total) by mouth 3 (three) times daily. 15 tablet 0    HYDROcodone-acetaminophen (NORCO) 5-325 mg per tablet Take 1 tablet by mouth every 4 (four) hours as needed for Pain. 18 tablet 0    ibuprofen (ADVIL,MOTRIN) 800 MG tablet ibuprofen 800 mg tablet   Take 1 tablet twice a day by oral route as needed.      lisinopril-hydrochlorothiazide (PRINZIDE,ZESTORETIC) 20-25 mg Tab Take 1 tablet by mouth once daily. 30 tablet 1    methocarbamol (ROBAXIN) 750 MG Tab methocarbamol 750 mg tablet   Take 1 tablet 3 times a day by oral route as needed.      methylPREDNISolone (MEDROL, CARLOS,) 4 mg tablet Medrol (Carlos) 4 mg tablets in a dose pack   as directed      omeprazole 20 mg TbEC omeprazole 20 mg tablet,delayed release   Take 1 tablet twice a day by oral route for 14 days.      atorvastatin (LIPITOR) 40 MG tablet Take 1 tablet (40 mg total) by mouth every evening. 30 tablet 1     No current facility-administered medications on file prior to visit.      Social History     Socioeconomic History    Marital status:      Spouse name: Not on file    Number of children: Not on file    Years of education: Not on file    Highest  "education level: Not on file   Occupational History    Not on file   Social Needs    Financial resource strain: Not on file    Food insecurity:     Worry: Not on file     Inability: Not on file    Transportation needs:     Medical: Not on file     Non-medical: Not on file   Tobacco Use    Smoking status: Current Every Day Smoker     Packs/day: 1.00    Smokeless tobacco: Current User   Substance and Sexual Activity    Alcohol use: No    Drug use: No    Sexual activity: Not on file   Lifestyle    Physical activity:     Days per week: Not on file     Minutes per session: Not on file    Stress: Not on file   Relationships    Social connections:     Talks on phone: Not on file     Gets together: Not on file     Attends Ca[employer] service: Not on file     Active member of club or organization: Not on file     Attends meetings of clubs or organizations: Not on file     Relationship status: Not on file   Other Topics Concern    Not on file   Social History Narrative    Not on file     Family History   Problem Relation Age of Onset    Ovarian cancer Mother     Breast cancer Maternal Grandmother         Review of Systems  Review of Systems   Constitutional: Negative for fatigue and fever.   HENT: Negative for sore throat and trouble swallowing.    Eyes: Negative for visual disturbance.   Respiratory: Negative for cough and shortness of breath.    Cardiovascular: Negative for chest pain and palpitations.   Gastrointestinal: Negative for abdominal pain, constipation, diarrhea and nausea.   Genitourinary: Negative for difficulty urinating and dysuria.   Musculoskeletal: Negative for arthralgias and back pain.   Neurological: Negative for dizziness, weakness and headaches.   Hematological: Negative for adenopathy.        Objective:   PHYSICAL EXAM:  BP (!) 154/77 (BP Location: Left arm, Patient Position: Sitting, BP Method: Medium (Automatic))   Pulse 64   Ht 5' 6" (1.676 m)   Wt 60.5 kg (133 lb 6.1 oz)   BMI " 21.53 kg/m²     Physical Exam   Pulmonary/Chest: She exhibits no tenderness and no deformity. Right breast exhibits mass. Right breast exhibits no inverted nipple, no nipple discharge, no skin change and no tenderness. Left breast exhibits no inverted nipple, no mass, no nipple discharge, no skin change and no tenderness.       Lymphadenopathy:     She has no cervical adenopathy.     She has axillary adenopathy.        Right axillary: Lateral adenopathy present.        Right: Supraclavicular adenopathy present.        Left: No supraclavicular adenopathy present.       Radiology review: Images personally reviewed by me in the clinic.       Assessment:      Briseyda Rojas is a 63 y.o. postmenopausal female with recently diagnosed carcinoma of the right breast.      Plan:    Options for management were discussed with the patient and her family. We reviewed the existing data noting the equivalency of breast conserving surgery with radiation therapy and mastectomy. We also reviewed the guidelines of the National Comprehensive Cancer Network for Stage 3 breast carcinoma. We discussed the need for lumpectomy margins to be negative for carcinoma, the necessity for postoperative radiation therapy after breast conservation in most cases, the possibility of a failed or false negative sentinel lymph node biopsy and the potential need for complete lymphadenectomy for a failed or positive sentinel lymph node biopsy were fully discussed. In the setting of mastectomy, delayed or immediate reconstruction options are available and were discussed.     In the setting of lumpectomy, radiation therapy would be recommended majority of the time.  The duration and treatment side effects were discussed with the patient.  This will coordinated with the radiation oncologist pending final pathology.    We also discussed the role of systemic therapy in the treatment of early stage breast cancer.   Side effects of treatment were briefly discussed.  We also discussed the potential role for chemotherapy based on a number of factors such as tumor phenotype (ER+ vs. triple negative vs. Pig1qwd+) and this would be determined in coordination with the medical oncologist.    We discussed the role of neoadjuvant chemotherapy for locally advanced breast cancer. We discussed that there is no survival benefit to proceeding with chemotherapy first; however, there are some benefits including potentially allowing breast conservation after shrinkage of the tumor and assessing the in vivo response to therapy.  We will order:  1. MRI  2. Referral to medical oncology to discuss neoadjuvant treatment  3. Referral to genetic counseling  4. She would prefer to have her port placed in [facility] due to transportaiton issues.      Patient was educated on breast cancer, receptors, wire localization lumpectomy, mastectomy, sentinel lymph node mapping and biopsy, axillary lymph node dissection, reconstruction, breast prosthesis with post-mastectomy bra and radiation therapy. Patient was given patient information binder including [facility] breast cancer treatment brochure.  All her questions were answered.    Total time spent with the patient: 60 minutes.  45 minutes of face to face consultation and 15 minutes of chart review and coordination of care.

## 2019-10-22 NOTE — PLAN OF CARE
START ON PATHWAY REGIMEN - Breast    [id number]        Doxorubicin (Adriamycin(R))       Cyclophosphamide (Cytoxan(R))       [id number]     **Always confirm dose/schedule in your pharmacy ordering system**        Paclitaxel (Taxol(R))     **Always confirm dose/schedule in your pharmacy ordering system**    Patient Characteristics:  Preoperative or Nonsurgical Candidate (Clinical Staging), Neoadjuvant Therapy   followed by Surgery, Invasive Disease, Chemotherapy, HER2   Negative/Unknown/Equivocal, ER Negative/Unknown, Platinum Therapy Not Indicated  Therapeutic Status: Preoperative or Nonsurgical Candidate (Clinical Staging)  AJCC M Category: cM0  AJCC Grade: GX  Breast Surgical Plan: Neoadjuvant Therapy followed by Surgery  ER Status: Negative (-)  AJCC 8 Stage Grouping: IIB  HER2 Status: Negative (-)  AJCC T Category: cTX  AJCC N Category: cNX  NJ Status: Negative (-)  Type of Therapy: Platinum Therapy Not Indicated  Intent of Therapy:  Curative Intent, Discussed with Patient

## 2019-10-22 NOTE — Clinical Note
Naima, needs echo, bone scan, and Ct scans, and labs prior to seeing me on 11/1 for chemo.  Orders are ine for everything

## 2019-10-22 NOTE — PROGRESS NOTES
Subjective:       Patient ID: Briseyda Rojas is a 63 y.o. female.    Chief Complaint: Breast Cancer Screening    HPI   Briseyda Rojas is a 63 y.o. female who presents for evaluation of recently diagnosed right breast cancer.  She underwent mammography and ultrasound in early September of this year which showed architectural distortion in the right upper lateral breast and pleomorphic calcifications in the right upper mid breast.  of note, this was her first mammogram ever.  Ultrasound of the axilla was also performed which showed a prominent lymph node.  She underwent biopsy of all 3 lesions. The right upper lateral lesion returned as DCIS with comedo necrosis.  The right upper mid breast came back as normal but had discoordination between imaging.  The right axillary lymph node returned as infiltrating ductal carcinoma, ER negative, PA negative, HER2 equivocal but negative by FISH.  She was been seen by Dr. Raya and she is being referred for consideration of neoadjuvant chemotherapy.    PMH: As above.  She also has a history of hypertension.  FAMILY History:  Mother ovarian cancer (?)  MGM breast cancer  Father unknown cancer  Sister unknown cancer  Brother lung cancer  Sister throat cancer            Past Surgical History:   Procedure Laterality Date    HYSTERECTOMY        WRIST FRACTURE SURGERY Bilateral          SOCIAL HISTORY: she is .  She is a homemaker.  She has been smoker for 50 + years, up to half a pack per day.  GYN HISTORY: Menarche at 14, first live birth at 27, and menopause more than 10 years ago, but she is not sure exactly when.   Review of Systems    Overall she feels OK. She is anxious with the recent developments and she is sore from her axillary biopsy, but she denies any depression, easy bruising, fevers, chills, night  sweats, weight loss, nausea, vomiting, diarrhea, constipation, diplopia,     blurred vision, headache, chest pain, palpitations, shortness of breath, left breast pain,  abdominal pain, extremity pain, or difficulty ambulating.  The remainder of the ten-point ROS, including general, skin, lymph, H/N, cardiorespiratory, GI, , Neuro, Endocrine, and psychiatric is negative.     Objective:      Physical Exam      She is alert, oriented to time, place, person, pleasant, well      nourished, in no acute physical distress.                                    VITAL SIGNS:  Reviewed                                      HEENT:  Normal.  There are no nasal, oral, lip, gingival, auricular, lid,    or conjunctival lesions.  Mucosae are moist and pink, and there is no        thrush.  Pupils are equal, reactive to light and accommodation.              Extraocular muscle movements are intact.  Dentition is good.  There is no frontal or maxillary tenderness.                                     NECK:  Supple without JVD, adenopathy, or thyromegaly.                       LUNGS:  Clear to auscultation without wheezing, rales, or rhonchi.           CARDIOVASCULAR:  Reveals an S1, S2, no murmurs, no rubs, no gallops.         ABDOMEN:  Soft, nontender, without organomegaly.  Bowel sounds are    present.                                                                     EXTREMITIES:  No cyanosis, clubbing, or edema.                               BREASTS:  the right breast is visibly swollen compared to the left.  There are no discrete masses.  There is a large right axillary mass, approximately 8 cm, tender to palpation.                                   LYMPHATIC:  There is no cervical, right axillary, or supraclavicular adenopathy.   SKIN:  Warm and moist, without petechiae, rashes, induration, or ecchymoses.           NEUROLOGIC:  DTRs are 0-1+ bilaterally, symmetrical, motor function is 5/5,  and cranial nerves are  within normal limits.    Assessment:       1. Carcinoma of axillary tail of right breast in female, estrogen receptor negative      2.    Biopsy proven axillary metastases.  Plan:          I had a long discussion with her and her .  I have also discussed her case at length with Dr. Raya. Given the phenotype of her tumor and the positive large axillary node, I recommended that she consider neoadjuvant chemotherapy.   The standard of care would be four cycles of AC and four cycles of taxol.  The pros and cons of such an approach were explained to her and her .   She had a CT of the chest without contrast and a CT of the abdomen and pelvis with contrast on 9/6/2019, and they were both negative.  Given the fact that it has been more than 6 weeks and this is now a large triple negative cancer, we will need to repeat her staging CT scans.  We will also proceed with a bone scan to complete her staging workup, obtain an echocardiogram, a breast MRI, and basic labs.   We will also ask her local surgeon to insert a port.  I will see her on November 1st and hopefully begin chemotherapy on that day.  I have also discussed that she needs a referral for genetic testing.  Dr. Raya will arrange.  Her multiple questions were answered to her satisfaction.  I spent approximately 60 minutes reviewing the available records and evaluating the patient, out of which over 50% of the time was spent face to face with the patient in counseling and coordinating this patient's care.

## 2019-10-23 ENCOUNTER — TELEPHONE (OUTPATIENT)
Dept: HEMATOLOGY/ONCOLOGY | Facility: CLINIC | Age: 64
End: 2019-10-23

## 2019-10-23 DIAGNOSIS — C50.911 MALIGNANT NEOPLASM OF RIGHT BREAST IN FEMALE, ESTROGEN RECEPTOR NEGATIVE, UNSPECIFIED SITE OF BREAST: Primary | ICD-10-CM

## 2019-10-23 DIAGNOSIS — Z17.1 MALIGNANT NEOPLASM OF RIGHT BREAST IN FEMALE, ESTROGEN RECEPTOR NEGATIVE, UNSPECIFIED SITE OF BREAST: Primary | ICD-10-CM

## 2019-10-25 ENCOUNTER — TELEPHONE (OUTPATIENT)
Dept: HEMATOLOGY/ONCOLOGY | Facility: CLINIC | Age: 64
End: 2019-10-25

## 2019-10-25 NOTE — TELEPHONE ENCOUNTER
Call made to patient. No answer. Voicemail left informing patient we were made aware that her insurance had  and we needed to have her call back with updated information if she currently has an active insurance plan so the authorization department can continue to work on getting her approved for her infusions.

## 2019-10-28 ENCOUNTER — TELEPHONE (OUTPATIENT)
Dept: HEMATOLOGY/ONCOLOGY | Facility: CLINIC | Age: 64
End: 2019-10-28

## 2019-10-28 NOTE — TELEPHONE ENCOUNTER
Reached out to patient. Reached Torin patient spouse, and spoke with him about this in detail. He exclaims that insurance was just renewed yesterday and that everything should now be good to go. Confirmed it was the same insurance agency and the same member ID.     Information relayed to [facility], to continue work up and approval process.

## 2019-10-29 ENCOUNTER — TELEPHONE (OUTPATIENT)
Dept: SURGERY | Facility: CLINIC | Age: 64
End: 2019-10-29

## 2019-10-29 ENCOUNTER — HOSPITAL ENCOUNTER (OUTPATIENT)
Dept: RADIOLOGY | Facility: HOSPITAL | Age: 64
Discharge: HOME | End: 2019-10-29
Attending: SURGERY
Payer: MEDICAID

## 2019-10-29 DIAGNOSIS — C50.919 BREAST CANCER: ICD-10-CM

## 2019-10-29 PROCEDURE — 25500020 PHARM REV CODE 255: Performed by: SURGERY

## 2019-10-29 PROCEDURE — 77049 MRI BREAST W/WO CONTRAST, W/CAD, BILATERAL: ICD-10-PCS | Mod: 26,,, | Performed by: RADIOLOGY

## 2019-10-29 PROCEDURE — A9577 INJ MULTIHANCE: HCPCS | Performed by: SURGERY

## 2019-10-29 PROCEDURE — 77049 MRI BREAST C-+ W/CAD BI: CPT | Mod: 26,,, | Performed by: RADIOLOGY

## 2019-10-29 PROCEDURE — 77049 MRI BREAST C-+ W/CAD BI: CPT | Mod: TC

## 2019-10-29 RX ADMIN — GADOBENATE DIMEGLUMINE 14 ML: 529 INJECTION, SOLUTION INTRAVENOUS at 05:10

## 2019-10-29 NOTE — TELEPHONE ENCOUNTER
Patient's  Torin Rojas presented to [facility] stating he needed help with his wife's appointments, he states she is having her port surgery with Dr. Mcmanus tomorrow 10-30-19 at 11 am at [facility] and can't make the ECHO and Bone Scan appointments, he states he tried to call medical oncology and the numbers on the papers but was not able to get in touch with anyone, advised patient that I will reach out to medical oncology office ad nuclear medicine to attempt to reschedule appointments for Friday 11-1-19 so patient can start chemo on 11-4-19, Mr. Rojas verbalized understanding

## 2019-10-30 PROBLEM — C50.911 BREAST CANCER, RIGHT BREAST: Status: ACTIVE | Noted: 2019-10-30

## 2019-11-01 ENCOUNTER — TELEPHONE (OUTPATIENT)
Dept: HEMATOLOGY/ONCOLOGY | Facility: CLINIC | Age: 64
End: 2019-11-01

## 2019-11-01 ENCOUNTER — LAB VISIT (OUTPATIENT)
Dept: LAB | Facility: HOSPITAL | Age: 64
End: 2019-11-01
Attending: INTERNAL MEDICINE
Payer: MEDICAID

## 2019-11-01 ENCOUNTER — OFFICE VISIT (OUTPATIENT)
Dept: HEMATOLOGY/ONCOLOGY | Facility: CLINIC | Age: 64
End: 2019-11-01
Payer: MEDICAID

## 2019-11-01 VITALS
WEIGHT: 136 LBS | HEIGHT: 66 IN | OXYGEN SATURATION: 100 % | HEART RATE: 56 BPM | DIASTOLIC BLOOD PRESSURE: 98 MMHG | RESPIRATION RATE: 20 BRPM | SYSTOLIC BLOOD PRESSURE: 200 MMHG | BODY MASS INDEX: 21.86 KG/M2 | TEMPERATURE: 98 F

## 2019-11-01 DIAGNOSIS — C50.611 CARCINOMA OF AXILLARY TAIL OF RIGHT BREAST IN FEMALE, ESTROGEN RECEPTOR NEGATIVE: Primary | ICD-10-CM

## 2019-11-01 DIAGNOSIS — C50.611 CARCINOMA OF AXILLARY TAIL OF RIGHT BREAST IN FEMALE, ESTROGEN RECEPTOR NEGATIVE: ICD-10-CM

## 2019-11-01 DIAGNOSIS — Z17.1 CARCINOMA OF AXILLARY TAIL OF RIGHT BREAST IN FEMALE, ESTROGEN RECEPTOR NEGATIVE: Primary | ICD-10-CM

## 2019-11-01 DIAGNOSIS — Z17.1 CARCINOMA OF AXILLARY TAIL OF RIGHT BREAST IN FEMALE, ESTROGEN RECEPTOR NEGATIVE: ICD-10-CM

## 2019-11-01 LAB
ALBUMIN SERPL BCP-MCNC: 3.5 G/DL (ref 3.5–5.2)
ALP SERPL-CCNC: 72 U/L (ref 55–135)
ALT SERPL W/O P-5'-P-CCNC: 30 U/L (ref 10–44)
ANION GAP SERPL CALC-SCNC: 9 MMOL/L (ref 8–16)
AST SERPL-CCNC: 28 U/L (ref 10–40)
BILIRUB SERPL-MCNC: 0.3 MG/DL (ref 0.1–1)
BUN SERPL-MCNC: 17 MG/DL (ref 8–23)
CALCIUM SERPL-MCNC: 9.9 MG/DL (ref 8.7–10.5)
CHLORIDE SERPL-SCNC: 105 MMOL/L (ref 95–110)
CO2 SERPL-SCNC: 28 MMOL/L (ref 23–29)
CREAT SERPL-MCNC: 1.3 MG/DL (ref 0.5–1.4)
ERYTHROCYTE [DISTWIDTH] IN BLOOD BY AUTOMATED COUNT: 13.9 % (ref 11.5–14.5)
EST. GFR  (AFRICAN AMERICAN): 50.5 ML/MIN/1.73 M^2
EST. GFR  (NON AFRICAN AMERICAN): 43.8 ML/MIN/1.73 M^2
GLUCOSE SERPL-MCNC: 92 MG/DL (ref 70–110)
HCT VFR BLD AUTO: 41 % (ref 37–48.5)
HGB BLD-MCNC: 12.5 G/DL (ref 12–16)
IMM GRANULOCYTES # BLD AUTO: 0.01 K/UL (ref 0–0.04)
MCH RBC QN AUTO: 28.3 PG (ref 27–31)
MCHC RBC AUTO-ENTMCNC: 30.5 G/DL (ref 32–36)
MCV RBC AUTO: 93 FL (ref 82–98)
NEUTROPHILS # BLD AUTO: 2 K/UL (ref 1.8–7.7)
PLATELET # BLD AUTO: 275 K/UL (ref 150–350)
PMV BLD AUTO: 10.1 FL (ref 9.2–12.9)
POTASSIUM SERPL-SCNC: 4.5 MMOL/L (ref 3.5–5.1)
PROT SERPL-MCNC: 7.9 G/DL (ref 6–8.4)
RBC # BLD AUTO: 4.42 M/UL (ref 4–5.4)
SODIUM SERPL-SCNC: 142 MMOL/L (ref 136–145)
WBC # BLD AUTO: 4.52 K/UL (ref 3.9–12.7)

## 2019-11-01 PROCEDURE — 99215 OFFICE O/P EST HI 40 MIN: CPT | Mod: S$PBB,,, | Performed by: INTERNAL MEDICINE

## 2019-11-01 PROCEDURE — 80053 COMPREHEN METABOLIC PANEL: CPT

## 2019-11-01 PROCEDURE — 85027 COMPLETE CBC AUTOMATED: CPT

## 2019-11-01 PROCEDURE — 99215 PR OFFICE/OUTPT VISIT, EST, LEVL V, 40-54 MIN: ICD-10-PCS | Mod: S$PBB,,, | Performed by: INTERNAL MEDICINE

## 2019-11-01 PROCEDURE — 99999 PR PBB SHADOW E&M-EST. PATIENT-LVL IV: ICD-10-PCS | Mod: PBBFAC,,, | Performed by: INTERNAL MEDICINE

## 2019-11-01 PROCEDURE — 99999 PR PBB SHADOW E&M-EST. PATIENT-LVL IV: CPT | Mod: PBBFAC,,, | Performed by: INTERNAL MEDICINE

## 2019-11-01 PROCEDURE — 99214 OFFICE O/P EST MOD 30 MIN: CPT | Mod: PBBFAC | Performed by: INTERNAL MEDICINE

## 2019-11-01 RX ORDER — DEXAMETHASONE 4 MG/1
TABLET ORAL
Qty: 12 TABLET | Refills: 3 | Status: SHIPPED | OUTPATIENT
Start: 2019-11-01 | End: 2020-01-08 | Stop reason: SDUPTHER

## 2019-11-01 RX ORDER — OXYCODONE HYDROCHLORIDE 5 MG/1
5 TABLET ORAL EVERY 8 HOURS PRN
Qty: 12 TABLET | Refills: 0 | Status: SHIPPED | OUTPATIENT
Start: 2019-11-01 | End: 2020-01-22

## 2019-11-01 RX ORDER — ONDANSETRON HYDROCHLORIDE 8 MG/1
TABLET, FILM COATED ORAL
Qty: 30 TABLET | Refills: 3 | Status: SHIPPED | OUTPATIENT
Start: 2019-11-01 | End: 2022-01-18

## 2019-11-01 NOTE — TELEPHONE ENCOUNTER
Returned call to patient spouse to discuss his questions/concerns.  Patient currently pending with medicaid. Patient can not proceed with treatment until bone scan and echocardiogram can be completed.   Patient met with  today.   Can not apply for [facility] financial assistance until medicaid response received.   Patient needs to sumbit check stub directly to medicaid today when he gets home (fax number available) so that the process can be completed.   Patient confirmed will complete today.   Will trend application status daily, once approved will get all appropriate tests re scheduled, chemo authorized and rearranged. Patient received pain medication management today in clinic.   Patient able to gain successfully.  Patient spouse very thankful. Will follow up with them next week.

## 2019-11-01 NOTE — PROGRESS NOTES
Subjective:       Patient ID: Briseyda Rojas is a 63 y.o. female.    Chief Complaint: No chief complaint on file.    HPI   Briseyda Rojas is a 63 y.o. female whom I saw initially last week for a recently diagnosed triple negative right breast cancer.  She underwent mammography and ultrasound in early September of this year which showed architectural distortion in the right upper lateral breast and pleomorphic calcifications in the right upper mid breast.  Of note, this was her first mammogram ever.  Ultrasound of the axilla was also performed which showed a prominent lymph node.  She underwent biopsy of all 3 lesions. The right upper lateral lesion returned as DCIS with comedo necrosis.  The right upper mid breast came back as normal but had discoordination between imaging.  The right axillary lymph node returned as infiltrating ductal carcinoma, ER negative, [place] negative, HER2 equivocal but negative by FISH.  She was been seen by Dr. Raya and she was referred for consideration of neoadjuvant chemotherapy.    We had set her up for staging imaging studies later today and chemotherapy on 11/4/2019, however, it is not clear whether she has insurance and so far her echocardiogram and her staging scans have not been approved.        Review of Systems    Overall she feels OK. She is anxious with the recent developments and she is sore from her port placement yesterday.  She is asking for an analgesic.  She denies any depression, easy bruising, fevers, chills, night  sweats, weight loss, nausea, vomiting, diarrhea, constipation, diplopia,  blurred vision, headache, chest pain, palpitations, shortness of breath, left breast pain, abdominal pain, extremity pain, or difficulty ambulating.  The remainder of the ten-point ROS, including general, skin, lymph, H/N, cardiorespiratory, GI, , Neuro, Endocrine, and psychiatric is negative.     Objective:      Physical Exam      She is alert, oriented to time, place, person, pleasant,  well      nourished, in no acute physical distress.                                    VITAL SIGNS:  Reviewed                                      HEENT:  Normal.  There are no nasal, oral, lip, gingival, auricular, lid,    or conjunctival lesions.  Mucosae are moist and pink, and there is no        thrush.  Pupils are equal, reactive to light and accommodation.              Extraocular muscle movements are intact.  Dentition is good.  There is no frontal or maxillary tenderness.                                     NECK:  Supple without JVD, adenopathy, or thyromegaly.                       LUNGS:  Clear to auscultation without wheezing, rales, or rhonchi.           CARDIOVASCULAR:  Reveals an S1, S2, no murmurs, no rubs, no gallops.         ABDOMEN:  Soft, nontender, without organomegaly.  Bowel sounds are    present.                                                                     EXTREMITIES:  No cyanosis, clubbing, or edema.                               BREASTS:  the right breast is visibly swollen compared to the left.  There are no discrete masses.  There is a large right axillary mass, approximately 8 cm, tender to palpation.      A left sided portacth is no identified.                               LYMPHATIC:  There is no cervical, left axillary, or supraclavicular adenopathy.   SKIN:  Warm and moist, without petechiae, rashes, induration, or ecchymoses.           NEUROLOGIC:  DTRs are 0-1+ bilaterally, symmetrical, motor function is 5/5,  and cranial nerves are  within normal limits.    Assessment:       1. Carcinoma of axillary tail of right breast in female, estrogen receptor negative      2.    Biopsy proven axillary metastases.  Plan:         I had a long discussion with her.  She cannot be treated before she gets an echocardiogram and staging studies.  She is scheduled for her studies later today, provided they are authorized, which, so far, they are not.  If we are able to obtain authorization and  she completes her workup, I will ask one of my colleagues to check the results on 11/4/2019 and sign off on her chemotherapy.  The above were explained to her.  She ahs been given prescriptions for antiemetics, and she has signed the chemotherapy consent form.  At her request, a prescription for analgesics (Oxycodone) was sent to our pharmacy.  RTC three weeks from her first cycle of chemotherapy.  We will arrange for her to meet with the financial  today.

## 2019-11-01 NOTE — TELEPHONE ENCOUNTER
----- Message from Linda Dacosta sent at 11/1/2019  2:11 PM CDT -----  Contact: Torin ()  Staff Message     Caller name: Torin    Reason for call: Requesting to speak with Naima regarding medicaid approval for appt        Communication Preference: [phone]    Additional Information:

## 2019-11-07 ENCOUNTER — TELEPHONE (OUTPATIENT)
Dept: SURGERY | Facility: CLINIC | Age: 64
End: 2019-11-07

## 2019-11-07 DIAGNOSIS — C50.611 CARCINOMA OF AXILLARY TAIL OF RIGHT BREAST IN FEMALE, ESTROGEN RECEPTOR NEGATIVE: Primary | ICD-10-CM

## 2019-11-07 DIAGNOSIS — Z17.1 CARCINOMA OF AXILLARY TAIL OF RIGHT BREAST IN FEMALE, ESTROGEN RECEPTOR NEGATIVE: Primary | ICD-10-CM

## 2019-11-07 NOTE — TELEPHONE ENCOUNTER
Called Patient's .  He had left a message for a return call.  Stated that he had gotten his questions answered.  He was checking on his Wife's appointments on Monday, 11-11-19.  He was also aware of her appointment tomorrow.  He did not have any further questions.

## 2019-11-07 NOTE — TELEPHONE ENCOUNTER
called and left voicemail. Returned call. Left voice mail. Explained that if there was an emergent issue they needed to call the on call medical oncology number, number provided in voicemail

## 2019-11-08 ENCOUNTER — TELEPHONE (OUTPATIENT)
Dept: HEMATOLOGY/ONCOLOGY | Facility: CLINIC | Age: 64
End: 2019-11-08

## 2019-11-08 NOTE — PROGRESS NOTES
Subjective:       Patient ID: Briseyda Rojas is a 63 y.o. female.    Chief Complaint: No chief complaint on file.    HPI   Briseyda Rojas is a 63 y.o. female whom I saw initially on October 22, 2019 for a recently diagnosed triple negative right breast cancer.  She underwent mammography and ultrasound in early September of this year which showed architectural distortion in the right upper lateral breast and pleomorphic calcifications in the right upper mid breast.  Of note, this was her first mammogram ever.  Ultrasound of the axilla was also performed which showed a prominent lymph node.  She underwent biopsy of all 3 lesions. The right upper lateral lesion returned as DCIS with comedo necrosis.  The right upper mid breast came back as normal.  The right axillary lymph node returned as infiltrating ductal carcinoma, ER negative, [place] negative, HER2 equivocal but negative by FISH.  She was been seen by Dr. Raya and she was referred for consideration of neoadjuvant chemotherapy.  Earlier today she underwent staging Ct scans and a bone scan.  I have reviewed them with the radiologist, and they show no evidence of distant metastases.    Her EF is 55 %.  WBCs are 4,500 /mm3, Hg 12.5 gr/dl, Ht 41 % and platelets 275,000 /mm3.  Bilirubin is 0.3 mg/dl.      Review of Systems    Overall she feels OK. She is anxious with the recent developments and eager to begin chemotherapy.  She denies any depression, easy bruising, fevers, chills, night  sweats, weight loss, nausea, vomiting, diarrhea, constipation, diplopia,  blurred vision, headache, chest pain, palpitations, shortness of breath, left breast pain, abdominal pain, extremity pain, or difficulty ambulating.  The remainder of the ten-point ROS, including general, skin, lymph, H/N, cardiorespiratory, GI, , Neuro, Endocrine, and psychiatric is negative.     Objective:      Physical Exam      She is alert, oriented to time, place, person, pleasant, well      nourished, in no  acute physical distress.  She is here with her daughter in law and her .                                 VITAL SIGNS:  Reviewed                                      HEENT:  Normal.  There are no nasal, oral, lip, gingival, auricular, lid,    or conjunctival lesions.  Mucosae are moist and pink, and there is no        thrush.  Pupils are equal, reactive to light and accommodation.              Extraocular muscle movements are intact.  Dentition is poor with several decayed teeth.  There is no frontal or maxillary tenderness.                                     NECK:  Supple without JVD, adenopathy, or thyromegaly.                       LUNGS:  Clear to auscultation without wheezing, rales, or rhonchi.           CARDIOVASCULAR:  Reveals an S1, S2, no murmurs, no rubs, no gallops.         ABDOMEN:  Soft, nontender, without organomegaly.  Bowel sounds are    present.                                                                     EXTREMITIES:  No cyanosis, clubbing, or edema.                               BREASTS:  the right breast is visibly swollen compared to the left.  There are no discrete masses.  There is a large right axillary mass, approximately 8 cm, tender to palpation.      A left sided portacth is no identified.                               LYMPHATIC:  There is no cervical, left axillary, or supraclavicular adenopathy.   SKIN:  Warm and moist, without petechiae, rashes, induration, or ecchymoses.           NEUROLOGIC:  DTRs are 0-1+ bilaterally, symmetrical, motor function is 5/5,  and cranial nerves are  within normal limits.    Assessment:       1. Carcinoma of axillary tail of right breast in female, estrogen receptor negative      2.    Biopsy proven axillary metastases.  Plan:         I had a long discussion with her.  She will proceed with cycle 1 of neoadjuvant AC in two days from [facility], followed by NEulasta on the next day.    Consent form had been signed on her last visit.   She has been given neutropenic precautions and she has prescriptions for dexamethasone and zofran for emesis prevention.  I will see her here with repeat labs in 2 weeks for cycle 2.  We will attempt to treat on a dose dense schedule given her triple negative tumor.   Her multiple questions were answered to her satisfaction.

## 2019-11-11 ENCOUNTER — HOSPITAL ENCOUNTER (OUTPATIENT)
Dept: RADIOLOGY | Facility: HOSPITAL | Age: 64
Discharge: HOME | End: 2019-11-11
Attending: INTERNAL MEDICINE
Payer: MEDICAID

## 2019-11-11 ENCOUNTER — TELEPHONE (OUTPATIENT)
Dept: HEMATOLOGY/ONCOLOGY | Facility: CLINIC | Age: 64
End: 2019-11-11

## 2019-11-11 ENCOUNTER — OFFICE VISIT (OUTPATIENT)
Dept: HEMATOLOGY/ONCOLOGY | Facility: CLINIC | Age: 64
End: 2019-11-11
Payer: MEDICAID

## 2019-11-11 VITALS
WEIGHT: 133.38 LBS | BODY MASS INDEX: 21.44 KG/M2 | SYSTOLIC BLOOD PRESSURE: 142 MMHG | OXYGEN SATURATION: 100 % | DIASTOLIC BLOOD PRESSURE: 69 MMHG | HEART RATE: 65 BPM | TEMPERATURE: 98 F | HEIGHT: 66 IN | RESPIRATION RATE: 16 BRPM

## 2019-11-11 DIAGNOSIS — Z17.1 CARCINOMA OF AXILLARY TAIL OF RIGHT BREAST IN FEMALE, ESTROGEN RECEPTOR NEGATIVE: Primary | ICD-10-CM

## 2019-11-11 DIAGNOSIS — C50.611 CARCINOMA OF AXILLARY TAIL OF RIGHT BREAST IN FEMALE, ESTROGEN RECEPTOR NEGATIVE: ICD-10-CM

## 2019-11-11 DIAGNOSIS — Z17.1 CARCINOMA OF AXILLARY TAIL OF RIGHT BREAST IN FEMALE, ESTROGEN RECEPTOR NEGATIVE: ICD-10-CM

## 2019-11-11 DIAGNOSIS — C50.611 CARCINOMA OF AXILLARY TAIL OF RIGHT BREAST IN FEMALE, ESTROGEN RECEPTOR NEGATIVE: Primary | ICD-10-CM

## 2019-11-11 PROCEDURE — 78306 NM BONE SCAN WHOLE BODY: ICD-10-PCS | Mod: 26,,, | Performed by: RADIOLOGY

## 2019-11-11 PROCEDURE — 99999 PR PBB SHADOW E&M-EST. PATIENT-LVL III: ICD-10-PCS | Mod: PBBFAC,,, | Performed by: INTERNAL MEDICINE

## 2019-11-11 PROCEDURE — 74177 CT ABD & PELVIS W/CONTRAST: CPT | Mod: 26,,, | Performed by: INTERNAL MEDICINE

## 2019-11-11 PROCEDURE — 74177 CT CHEST ABDOMEN PELVIS WITH CONTRAST (XPD): ICD-10-PCS | Mod: 26,,, | Performed by: INTERNAL MEDICINE

## 2019-11-11 PROCEDURE — 71260 CT CHEST ABDOMEN PELVIS WITH CONTRAST (XPD): ICD-10-PCS | Mod: 26,,, | Performed by: INTERNAL MEDICINE

## 2019-11-11 PROCEDURE — 25500020 PHARM REV CODE 255: Performed by: INTERNAL MEDICINE

## 2019-11-11 PROCEDURE — A9503 TC99M MEDRONATE: HCPCS

## 2019-11-11 PROCEDURE — 99215 OFFICE O/P EST HI 40 MIN: CPT | Mod: S$PBB,,, | Performed by: INTERNAL MEDICINE

## 2019-11-11 PROCEDURE — 74177 CT ABD & PELVIS W/CONTRAST: CPT | Mod: TC

## 2019-11-11 PROCEDURE — 99215 PR OFFICE/OUTPT VISIT, EST, LEVL V, 40-54 MIN: ICD-10-PCS | Mod: S$PBB,,, | Performed by: INTERNAL MEDICINE

## 2019-11-11 PROCEDURE — 78306 BONE IMAGING WHOLE BODY: CPT | Mod: 26,,, | Performed by: RADIOLOGY

## 2019-11-11 PROCEDURE — 71260 CT THORAX DX C+: CPT | Mod: 26,,, | Performed by: INTERNAL MEDICINE

## 2019-11-11 PROCEDURE — 99213 OFFICE O/P EST LOW 20 MIN: CPT | Mod: PBBFAC,25 | Performed by: INTERNAL MEDICINE

## 2019-11-11 PROCEDURE — 99999 PR PBB SHADOW E&M-EST. PATIENT-LVL III: CPT | Mod: PBBFAC,,, | Performed by: INTERNAL MEDICINE

## 2019-11-11 RX ORDER — HEPARIN 100 UNIT/ML
500 SYRINGE INTRAVENOUS
Status: CANCELLED | OUTPATIENT
Start: 2019-11-13

## 2019-11-11 RX ORDER — SODIUM CHLORIDE 0.9 % (FLUSH) 0.9 %
10 SYRINGE (ML) INJECTION
Status: CANCELLED | OUTPATIENT
Start: 2019-11-13

## 2019-11-11 RX ORDER — DOXORUBICIN HYDROCHLORIDE 2 MG/ML
60 INJECTION, SOLUTION INTRAVENOUS
Status: CANCELLED | OUTPATIENT
Start: 2019-11-13

## 2019-11-11 RX ADMIN — IOHEXOL 75 ML: 350 INJECTION, SOLUTION INTRAVENOUS at 02:11

## 2019-11-11 RX ADMIN — IOHEXOL 15 ML: 350 INJECTION, SOLUTION INTRAVENOUS at 02:11

## 2019-11-11 RX ADMIN — IOHEXOL 15 ML: 350 INJECTION, SOLUTION INTRAVENOUS at 01:11

## 2019-11-11 NOTE — TELEPHONE ENCOUNTER
Spoke with Mr. Rojas. Scheduled office visit for 11/26 and labs at [facility] on 11/25. Advised Mr. Rojas I will mail an appt slip with the details.

## 2019-11-11 NOTE — Clinical Note
Chemotherapy on 11/13 at [facility].See me on 11/26; plan for cycle 2 on 11/27 at [facility].Please have our  (grant Tejeda reach out to her; she has questions about disability etc.

## 2019-11-19 ENCOUNTER — OFFICE VISIT (OUTPATIENT)
Dept: SURGERY | Facility: CLINIC | Age: 64
End: 2019-11-19
Payer: MEDICAID

## 2019-11-19 VITALS
HEART RATE: 103 BPM | SYSTOLIC BLOOD PRESSURE: 104 MMHG | HEIGHT: 66 IN | BODY MASS INDEX: 20.37 KG/M2 | OXYGEN SATURATION: 98 % | TEMPERATURE: 98 F | WEIGHT: 126.75 LBS | DIASTOLIC BLOOD PRESSURE: 62 MMHG

## 2019-11-19 DIAGNOSIS — C50.911 MALIGNANT NEOPLASM OF RIGHT FEMALE BREAST, UNSPECIFIED ESTROGEN RECEPTOR STATUS, UNSPECIFIED SITE OF BREAST: ICD-10-CM

## 2019-11-19 DIAGNOSIS — Z45.2 ENCOUNTER FOR CARE RELATED TO VASCULAR ACCESS PORT: Primary | ICD-10-CM

## 2019-11-19 PROCEDURE — 99024 PR POST-OP FOLLOW-UP VISIT: ICD-10-PCS | Mod: ,,, | Performed by: SURGERY

## 2019-11-19 PROCEDURE — 99999 PR PBB SHADOW E&M-EST. PATIENT-LVL III: ICD-10-PCS | Mod: PBBFAC,,, | Performed by: SURGERY

## 2019-11-19 PROCEDURE — 99999 PR PBB SHADOW E&M-EST. PATIENT-LVL III: CPT | Mod: PBBFAC,,, | Performed by: SURGERY

## 2019-11-19 PROCEDURE — 99213 OFFICE O/P EST LOW 20 MIN: CPT | Mod: PBBFAC,PN | Performed by: SURGERY

## 2019-11-19 PROCEDURE — 99024 POSTOP FOLLOW-UP VISIT: CPT | Mod: ,,, | Performed by: SURGERY

## 2019-11-25 ENCOUNTER — TELEPHONE (OUTPATIENT)
Dept: HEMATOLOGY/ONCOLOGY | Facility: CLINIC | Age: 64
End: 2019-11-25

## 2019-11-25 NOTE — PROGRESS NOTES
[facility]  POST-OP PROGRESS NOTE    HPI: Briseyda Rojas is a 64 y.o. female status post insertion of left internal jugular Port-A-Cath for treatment for locally advanced right breast cancer.  Patient without significant complaining suffer minor tenderness at the neck and chest incision site. Denies fevers or chills.  Has started chemotherapy at [facility].    VITALS:  Vitals:    11/19/19 1406   BP: 104/62   Pulse: 103   Temp: 97.8 °F (36.6 °C)       PHYSICAL EXAM:  Right IJ insertion site well-healed without signs of infection  Right chest port site healed well without signs of infection      ASSESSMENT & PLAN:  64 y.o. female with right breast cancer s/p left IJ port placement  - continue to use port as needed  - follow-up with breast Center for further care

## 2019-11-25 NOTE — TELEPHONE ENCOUNTER
Contacted pt about upcoming appt. Problems with transportation. Pt  states they will try to make it.. I offered to change the appt but Dr. De Leon is not available tomorrow afternoon. I will go speak with Dr. De Leon to see if there is any changes he would like to make.

## 2019-11-25 NOTE — TELEPHONE ENCOUNTER
Contacted pt about upcoming appt. Pt  confirmed appt for tomorrow 11/26/19 at 9:20 am. I contacted pt to offer another time for their ability to make it. Pt  assured me that he would like to keep the appt for tomorrow and that they will make it for sure. He worked out the transportation situation.

## 2019-11-26 ENCOUNTER — OFFICE VISIT (OUTPATIENT)
Dept: HEMATOLOGY/ONCOLOGY | Facility: CLINIC | Age: 64
End: 2019-11-26
Payer: MEDICAID

## 2019-11-26 VITALS
OXYGEN SATURATION: 100 % | WEIGHT: 129.63 LBS | DIASTOLIC BLOOD PRESSURE: 77 MMHG | HEART RATE: 81 BPM | RESPIRATION RATE: 16 BRPM | SYSTOLIC BLOOD PRESSURE: 148 MMHG | HEIGHT: 66 IN | BODY MASS INDEX: 20.83 KG/M2 | TEMPERATURE: 98 F

## 2019-11-26 DIAGNOSIS — C50.611 CARCINOMA OF AXILLARY TAIL OF RIGHT BREAST IN FEMALE, ESTROGEN RECEPTOR NEGATIVE: Primary | ICD-10-CM

## 2019-11-26 DIAGNOSIS — Z17.1 CARCINOMA OF AXILLARY TAIL OF RIGHT BREAST IN FEMALE, ESTROGEN RECEPTOR NEGATIVE: Primary | ICD-10-CM

## 2019-11-26 PROCEDURE — 99999 PR PBB SHADOW E&M-EST. PATIENT-LVL IV: ICD-10-PCS | Mod: PBBFAC,,, | Performed by: INTERNAL MEDICINE

## 2019-11-26 PROCEDURE — 99999 PR PBB SHADOW E&M-EST. PATIENT-LVL IV: CPT | Mod: PBBFAC,,, | Performed by: INTERNAL MEDICINE

## 2019-11-26 PROCEDURE — 99214 OFFICE O/P EST MOD 30 MIN: CPT | Mod: PBBFAC | Performed by: INTERNAL MEDICINE

## 2019-11-26 PROCEDURE — 99215 PR OFFICE/OUTPT VISIT, EST, LEVL V, 40-54 MIN: ICD-10-PCS | Mod: S$PBB,,, | Performed by: INTERNAL MEDICINE

## 2019-11-26 PROCEDURE — 99215 OFFICE O/P EST HI 40 MIN: CPT | Mod: S$PBB,,, | Performed by: INTERNAL MEDICINE

## 2019-11-26 RX ORDER — DOXORUBICIN HYDROCHLORIDE 2 MG/ML
60 INJECTION, SOLUTION INTRAVENOUS
Status: CANCELLED | OUTPATIENT
Start: 2019-11-27

## 2019-11-26 RX ORDER — SODIUM CHLORIDE 0.9 % (FLUSH) 0.9 %
10 SYRINGE (ML) INJECTION
Status: CANCELLED | OUTPATIENT
Start: 2019-11-27

## 2019-11-26 RX ORDER — HEPARIN 100 UNIT/ML
500 SYRINGE INTRAVENOUS
Status: CANCELLED | OUTPATIENT
Start: 2019-11-27

## 2019-11-26 NOTE — PROGRESS NOTES
Subjective:       Patient ID: Briseyda Rojas is a 64 y.o. female.    Chief Complaint: Follow-up    HPI   Briseyda Rojas returns today for follow up.  She is due for her second cycle of neoadjuvant AC tomorrow.  Briefly, she  is a 63 y.o. female whom I saw initially on October 22, 2019 for a recently diagnosed triple negative right breast cancer.  She underwent mammography and ultrasound in early September of this year which showed architectural distortion in the right upper lateral breast and pleomorphic calcifications in the right upper mid breast.  Of note, this was her first mammogram ever.  Ultrasound of the axilla was also performed which showed a prominent lymph node.  She underwent biopsy of all 3 lesions. The right upper lateral lesion returned as DCIS with comedo necrosis.  The right upper mid breast came back as normal.  The right axillary lymph node returned as infiltrating ductal carcinoma, ER negative, CT negative, HER2 equivocal but negative by FISH.  She was been seen by Dr. Raya and she was referred for consideration of neoadjuvant chemotherapy.    Her CBc from yesterday shows that her WBCs are 10,800 /mm3, Hg 11.4 gr/dl, Ht 36 % and platelets 147,000 /mm3.  Bilirubin is 0.3 mg/dl.         Review of Systems    Overall she feels OK. She tolerated cycle 1 quite well.  She did experience some bone pains from the Neulasta.   She denies any depression, easy bruising, fevers, chills, night  sweats, weight loss, nausea, vomiting, diarrhea, constipation, diplopia,  blurred vision, headache, chest pain, palpitations, shortness of breath, left breast pain, abdominal pain, extremity pain, or difficulty ambulating.  The remainder of the ten-point ROS, including general, skin, lymph, H/N, cardiorespiratory, GI, , Neuro, Endocrine, and psychiatric is negative.     Objective:      Physical Exam      She is alert, oriented to time, place, person, pleasant, well      nourished, in no acute physical distress.  She is  here with her  and 2 more family members.                                 VITAL SIGNS:  Reviewed                                      HEENT:  Normal.  There are no nasal, oral, lip, gingival, auricular, lid,    or conjunctival lesions.  Mucosae are moist and pink, and there is no        thrush.  Pupils are equal, reactive to light and accommodation.              Extraocular muscle movements are intact.  Dentition is poor with several decayed teeth.  There is no frontal or maxillary tenderness.                                     NECK:  Supple without JVD, adenopathy, or thyromegaly.                       LUNGS:  Clear to auscultation without wheezing, rales, or rhonchi.           CARDIOVASCULAR:  Reveals an S1, S2, no murmurs, no rubs, no gallops.         ABDOMEN:  Soft, nontender, without organomegaly.  Bowel sounds are    present.                                                                     EXTREMITIES:  No cyanosis, clubbing, or edema.                               BREASTS:  the right breast is no longer visibly swollen compared to the left.  There are no discrete masses.  There is a large right axillary mass, approximately 4 cm, clearly smaller compared to two weeks ago when chemotherapy was initiated..      A left sided portacth is again identified.                               LYMPHATIC:  There is no cervical, left axillary, or supraclavicular adenopathy.   SKIN:  Warm and moist, without petechiae, rashes, induration, or ecchymoses.           NEUROLOGIC:  DTRs are 0-1+ bilaterally, symmetrical, motor function is 5/5,  and cranial nerves are  within normal limits.    Assessment:       1. Carcinoma of axillary tail of right breast in female, estrogen receptor negative      2.    Biopsy proven axillary metastases.  Plan:         I had a long discussion with her.  She will proceed with cycle 2 of neoadjuvant AC tomorrow at [facility], followed by Neulasta the next day.    She was again given  neutropenic precautions and she has prescriptions for dexamethasone and zofran for emesis prevention.  I will see her here with repeat labs in 2 weeks for cycle 3.  We will attempt to treat on a dose dense schedule given her triple negative tumor.   Her multiple questions were answered to her satisfaction.

## 2019-11-26 NOTE — Clinical Note
See me in two weeks for chemo the next day at [facility].  Labs one day prior at [facility].

## 2019-11-29 ENCOUNTER — TELEPHONE (OUTPATIENT)
Dept: SURGERY | Facility: CLINIC | Age: 64
End: 2019-11-29

## 2019-11-29 NOTE — TELEPHONE ENCOUNTER
Left VM with my direct contact information, patient advised to give a return call with any questions or concerns rt genetic test results

## 2019-12-11 ENCOUNTER — OFFICE VISIT (OUTPATIENT)
Dept: HEMATOLOGY/ONCOLOGY | Facility: CLINIC | Age: 64
End: 2019-12-11
Payer: MEDICAID

## 2019-12-11 VITALS
TEMPERATURE: 99 F | SYSTOLIC BLOOD PRESSURE: 159 MMHG | HEART RATE: 70 BPM | DIASTOLIC BLOOD PRESSURE: 88 MMHG | OXYGEN SATURATION: 98 % | RESPIRATION RATE: 18 BRPM | BODY MASS INDEX: 21.18 KG/M2 | WEIGHT: 131.81 LBS | HEIGHT: 66 IN

## 2019-12-11 DIAGNOSIS — T45.1X5A ANEMIA ASSOCIATED WITH CHEMOTHERAPY: ICD-10-CM

## 2019-12-11 DIAGNOSIS — C50.611 CARCINOMA OF AXILLARY TAIL OF RIGHT BREAST IN FEMALE, ESTROGEN RECEPTOR NEGATIVE: Primary | ICD-10-CM

## 2019-12-11 DIAGNOSIS — Z17.1 CARCINOMA OF AXILLARY TAIL OF RIGHT BREAST IN FEMALE, ESTROGEN RECEPTOR NEGATIVE: Primary | ICD-10-CM

## 2019-12-11 DIAGNOSIS — D64.81 ANEMIA ASSOCIATED WITH CHEMOTHERAPY: ICD-10-CM

## 2019-12-11 PROCEDURE — 99215 PR OFFICE/OUTPT VISIT, EST, LEVL V, 40-54 MIN: ICD-10-PCS | Mod: S$PBB,,, | Performed by: INTERNAL MEDICINE

## 2019-12-11 PROCEDURE — 99215 OFFICE O/P EST HI 40 MIN: CPT | Mod: S$PBB,,, | Performed by: INTERNAL MEDICINE

## 2019-12-11 PROCEDURE — 99213 OFFICE O/P EST LOW 20 MIN: CPT | Mod: PBBFAC | Performed by: INTERNAL MEDICINE

## 2019-12-11 PROCEDURE — 99999 PR PBB SHADOW E&M-EST. PATIENT-LVL III: ICD-10-PCS | Mod: PBBFAC,,, | Performed by: INTERNAL MEDICINE

## 2019-12-11 PROCEDURE — 99999 PR PBB SHADOW E&M-EST. PATIENT-LVL III: CPT | Mod: PBBFAC,,, | Performed by: INTERNAL MEDICINE

## 2019-12-11 RX ORDER — SODIUM CHLORIDE 0.9 % (FLUSH) 0.9 %
10 SYRINGE (ML) INJECTION
Status: CANCELLED | OUTPATIENT
Start: 2019-12-11

## 2019-12-11 RX ORDER — HEPARIN 100 UNIT/ML
500 SYRINGE INTRAVENOUS
Status: CANCELLED | OUTPATIENT
Start: 2019-12-11

## 2019-12-11 RX ORDER — DOXORUBICIN HYDROCHLORIDE 2 MG/ML
60 INJECTION, SOLUTION INTRAVENOUS
Status: CANCELLED | OUTPATIENT
Start: 2019-12-11

## 2019-12-11 NOTE — PROGRESS NOTES
Subjective:       Patient ID: Briseyda Rojas is a 64 y.o. female.    Chief Complaint: Carcinoma of axillary tail of right breast in female, estrog    Follow-up        Briseyda Rojas returns today for follow up.  She is due for her third cycle of neoadjuvant AC tomorrow.  Briefly, she  is a 63 y.o. female whom I saw initially on October 22, 2019 for a recently diagnosed triple negative right breast cancer.  She underwent mammography and ultrasound in early September of this year which showed architectural distortion in the right upper lateral breast and pleomorphic calcifications in the right upper mid breast.  Of note, this was her first mammogram ever.  Ultrasound of the axilla was also performed which showed a prominent lymph node.  She underwent biopsy of all 3 lesions. The right upper lateral lesion returned as DCIS with comedo necrosis.  The right upper mid breast came back as normal.  The right axillary lymph node returned as infiltrating ductal carcinoma, ER negative, [place] negative, HER2 equivocal but negative by FISH.  She was been seen by Dr. Raya and she was referred for consideration of neoadjuvant chemotherapy.    Her CBC from yesterday shows a WBC count of 11,200 /mm3, Hg 10.1 gr/dl, Ht 32.1 % and platelets 184,000 /mm3.  Bilirubin is 0.2 mg/dl.         Review of Systems    Overall she feels OK. She tolerated cycle 2 quite well.  She did experience some bone pains from the Neulasta.   Her main complaint today is persistent cough productive of clear sputum.  No fever.  She denies any depression, easy bruising, chills, night  sweats, weight loss, nausea, vomiting, diarrhea, constipation, diplopia,  blurred vision, headache, chest pain, palpitations, shortness of breath, left breast pain, abdominal pain, extremity pain, or difficulty ambulating.  The remainder of the ten-point ROS, including general, skin, lymph, H/N, cardiorespiratory, GI, , Neuro, Endocrine, and psychiatric is negative.     Objective:       Physical Exam      She is alert, oriented to time, place, person, pleasant, well      nourished, in no acute physical distress.                               VITAL SIGNS:  Reviewed                                      HEENT:  Normal.  There are no nasal, oral, lip, gingival, auricular, lid,    or conjunctival lesions.  Mucosae are moist and pink, and there is no        thrush.  Pupils are equal, reactive to light and accommodation.              Extraocular muscle movements are intact.  Dentition is poor with several decayed teeth.  There is no frontal or maxillary tenderness.                                     NECK:  Supple without JVD, adenopathy, or thyromegaly.                       LUNGS:  Clear to auscultation without wheezing, rales, or rhonchi.           CARDIOVASCULAR:  Reveals an S1, S2, no murmurs, no rubs, no gallops.         ABDOMEN:  Soft, nontender, without organomegaly.  Bowel sounds are    present.                                                                     EXTREMITIES:  No cyanosis, clubbing, or edema.                               BREASTS:  the right breast has no discrete masses.  There is an right axillary mass, approximately 4 cm, clearly smaller compared to 4 weeks ago when chemotherapy was initiated..      A left sided portacth is again identified.                               LYMPHATIC:  There is no cervical, left axillary, or supraclavicular adenopathy.   SKIN:  Warm and moist, without petechiae, rashes, induration, or ecchymoses.           NEUROLOGIC:  DTRs are 0-1+ bilaterally, symmetrical, motor function is 5/5,  and cranial nerves are  within normal limits.    Assessment:       1. Carcinoma of axillary tail of right breast in female, estrogen receptor negative    2. Anemia associated with chemotherapy      2.    Biopsy proven axillary metastases.  Plan:         I had a long discussion with her.  She will proceed with cycle 3 of neoadjuvant AC tomorrow at [facility]  by Neulasta the next day.    She was again given neutropenic precautions and she has prescriptions for dexamethasone and zofran for emesis prevention.  She will return to see me on 12/24 with repeat labs.  Cycle 4 will be administered on 12/26.   We will continue to treat on a dose dense schedule given her triple negative tumor.   Her multiple questions were answered to her satisfaction.

## 2019-12-24 ENCOUNTER — OFFICE VISIT (OUTPATIENT)
Dept: HEMATOLOGY/ONCOLOGY | Facility: CLINIC | Age: 64
End: 2019-12-24
Payer: MEDICAID

## 2019-12-24 VITALS
HEART RATE: 62 BPM | SYSTOLIC BLOOD PRESSURE: 162 MMHG | OXYGEN SATURATION: 98 % | RESPIRATION RATE: 16 BRPM | TEMPERATURE: 99 F | HEIGHT: 66 IN | BODY MASS INDEX: 20.34 KG/M2 | DIASTOLIC BLOOD PRESSURE: 89 MMHG | WEIGHT: 126.56 LBS

## 2019-12-24 DIAGNOSIS — D64.81 ANEMIA ASSOCIATED WITH CHEMOTHERAPY: ICD-10-CM

## 2019-12-24 DIAGNOSIS — C50.611 CARCINOMA OF AXILLARY TAIL OF RIGHT BREAST IN FEMALE, ESTROGEN RECEPTOR NEGATIVE: Primary | ICD-10-CM

## 2019-12-24 DIAGNOSIS — Z17.1 CARCINOMA OF AXILLARY TAIL OF RIGHT BREAST IN FEMALE, ESTROGEN RECEPTOR NEGATIVE: Primary | ICD-10-CM

## 2019-12-24 DIAGNOSIS — T45.1X5A ANEMIA ASSOCIATED WITH CHEMOTHERAPY: ICD-10-CM

## 2019-12-24 PROCEDURE — 99215 PR OFFICE/OUTPT VISIT, EST, LEVL V, 40-54 MIN: ICD-10-PCS | Mod: S$PBB,,, | Performed by: INTERNAL MEDICINE

## 2019-12-24 PROCEDURE — 99215 OFFICE O/P EST HI 40 MIN: CPT | Mod: S$PBB,,, | Performed by: INTERNAL MEDICINE

## 2019-12-24 PROCEDURE — 99999 PR PBB SHADOW E&M-EST. PATIENT-LVL III: CPT | Mod: PBBFAC,,, | Performed by: INTERNAL MEDICINE

## 2019-12-24 PROCEDURE — 99999 PR PBB SHADOW E&M-EST. PATIENT-LVL III: ICD-10-PCS | Mod: PBBFAC,,, | Performed by: INTERNAL MEDICINE

## 2019-12-24 PROCEDURE — 99213 OFFICE O/P EST LOW 20 MIN: CPT | Mod: PBBFAC | Performed by: INTERNAL MEDICINE

## 2019-12-24 RX ORDER — SODIUM CHLORIDE 0.9 % (FLUSH) 0.9 %
10 SYRINGE (ML) INJECTION
Status: CANCELLED | OUTPATIENT
Start: 2019-12-26

## 2019-12-24 RX ORDER — DOXORUBICIN HYDROCHLORIDE 2 MG/ML
60 INJECTION, SOLUTION INTRAVENOUS
Status: CANCELLED | OUTPATIENT
Start: 2019-12-26

## 2019-12-24 RX ORDER — DEXAMETHASONE 4 MG/1
TABLET ORAL
Qty: 40 TABLET | Refills: 0 | Status: SHIPPED | OUTPATIENT
Start: 2019-12-24 | End: 2020-03-06 | Stop reason: ALTCHOICE

## 2019-12-24 RX ORDER — HEPARIN 100 UNIT/ML
500 SYRINGE INTRAVENOUS
Status: CANCELLED | OUTPATIENT
Start: 2019-12-26

## 2019-12-24 NOTE — PROGRESS NOTES
Subjective:       Patient ID: Briseyda Rojas is a 64 y.o. female.    Chief Complaint: Follow-up    Follow-up        Briseyda Rojas returns today for follow up.  She is due for her fourth cycle of neoadjuvant AC tomorrow.  She is being treated on a Q 2 W schedule.  Briefly, she  is a 64 y.o. female whom I saw initially on October 22, 2019 for a recently diagnosed triple negative right breast cancer.  She underwent mammography and ultrasound in early September of this year which showed architectural distortion in the right upper lateral breast and pleomorphic calcifications in the right upper mid breast.  Of note, this was her first mammogram ever.  Ultrasound of the axilla was also performed which showed a prominent lymph node.  She underwent biopsy of all 3 lesions. The right upper lateral lesion returned as DCIS with comedo necrosis.  The right upper mid breast came back as normal.  The right axillary lymph node returned as infiltrating ductal carcinoma, ER negative, [place] negative, HER2 equivocal but negative by FISH.  She was been seen by Dr. Raya and she was referred for consideration of neoadjuvant chemotherapy.    Her CBC from earlier today shows a WBC count of 15,400 /mm3, Hg 9.4 gr/dl, Ht 29.7 % and platelets 172,000 /mm3.  Bilirubin is 0.2 mg/dl.         Review of Systems    Overall she feels OK. She tolerated cycle 3 quite well.  She again experienced some bone pains from the Neulasta.  ECOG PS  Is 1.   She denies any depression, easy bruising, chills, night  sweats, weight loss, fever, nausea, vomiting, diarrhea, constipation, diplopia,  blurred vision, headache, chest pain, palpitations, shortness of breath, left breast pain, abdominal pain, extremity pain, or difficulty ambulating.  The remainder of the ten-point ROS, including general, skin, lymph, H/N, cardiorespiratory, GI, , Neuro, Endocrine, and psychiatric is negative.     Objective:      Physical Exam      She is alert, oriented to time, place,  person, pleasant, well      nourished, in no acute physical distress.                               VITAL SIGNS:  Reviewed                                      HEENT:  Normal.  There are no nasal, oral, lip, gingival, auricular, lid,    or conjunctival lesions.  Mucosae are moist and pink, and there is no        thrush.  Pupils are equal, reactive to light and accommodation.              Extraocular muscle movements are intact.  Dentition is poor with several decayed teeth.  There is no frontal or maxillary tenderness.                                     NECK:  Supple without JVD, adenopathy, or thyromegaly.                       LUNGS:  Clear to auscultation without wheezing, rales, or rhonchi.           CARDIOVASCULAR:  Reveals an S1, S2, no murmurs, no rubs, no gallops.         ABDOMEN:  Soft, nontender, without organomegaly.  Bowel sounds are    present.                                                                     EXTREMITIES:  No cyanosis, clubbing, or edema.                               BREASTS:  the right breast has no discrete masses.  There is an right axillary mass, approximately 2 cm, clearly smaller compared to 6 weeks ago when chemotherapy was initiated..      A left sided portacth is again identified.                               LYMPHATIC:  There is no cervical, left axillary, or supraclavicular adenopathy.   SKIN:  Warm and moist, without petechiae, rashes, induration, or ecchymoses.           NEUROLOGIC:  DTRs are 0-1+ bilaterally, symmetrical, motor function is 5/5,  and cranial nerves are  within normal limits.    Assessment:       1. Carcinoma of axillary tail of right breast in female, estrogen receptor negative    2. Anemia associated with chemotherapy      2.    Biopsy proven axillary metastases.  Plan:         I had a long discussion with her.  She will proceed with cycle 4 of neoadjuvant AC on 12/26 at [facility], followed by Neulasta the next day.    She was again given neutropenic  precautions and she has prescriptions for dexamethasone and zofran for emesis prevention.  She will return to see me in 15 days with repeat labs for the first cycle of taxol.  She has been given  A prescription for dexamethasone to take prior to the taxol administration.   We will continue to treat on a dose dense schedule given her triple negative tumor.   Her multiple questions were answered to her satisfaction.

## 2020-01-08 ENCOUNTER — LAB VISIT (OUTPATIENT)
Dept: LAB | Facility: HOSPITAL | Age: 65
End: 2020-01-08
Attending: INTERNAL MEDICINE
Payer: MEDICAID

## 2020-01-08 ENCOUNTER — OFFICE VISIT (OUTPATIENT)
Dept: HEMATOLOGY/ONCOLOGY | Facility: CLINIC | Age: 65
End: 2020-01-08
Payer: MEDICAID

## 2020-01-08 VITALS
HEIGHT: 66 IN | WEIGHT: 123.44 LBS | DIASTOLIC BLOOD PRESSURE: 86 MMHG | OXYGEN SATURATION: 99 % | TEMPERATURE: 98 F | SYSTOLIC BLOOD PRESSURE: 177 MMHG | HEART RATE: 67 BPM | RESPIRATION RATE: 18 BRPM | BODY MASS INDEX: 19.84 KG/M2

## 2020-01-08 DIAGNOSIS — C50.611 CARCINOMA OF AXILLARY TAIL OF RIGHT BREAST IN FEMALE, ESTROGEN RECEPTOR NEGATIVE: ICD-10-CM

## 2020-01-08 DIAGNOSIS — Z17.1 CARCINOMA OF AXILLARY TAIL OF RIGHT BREAST IN FEMALE, ESTROGEN RECEPTOR NEGATIVE: Primary | ICD-10-CM

## 2020-01-08 DIAGNOSIS — C50.611 CARCINOMA OF AXILLARY TAIL OF RIGHT BREAST IN FEMALE, ESTROGEN RECEPTOR NEGATIVE: Primary | ICD-10-CM

## 2020-01-08 DIAGNOSIS — Z17.1 CARCINOMA OF AXILLARY TAIL OF RIGHT BREAST IN FEMALE, ESTROGEN RECEPTOR NEGATIVE: ICD-10-CM

## 2020-01-08 LAB
ALBUMIN SERPL BCP-MCNC: 3.4 G/DL (ref 3.5–5.2)
ALP SERPL-CCNC: 86 U/L (ref 55–135)
ALT SERPL W/O P-5'-P-CCNC: 7 U/L (ref 10–44)
ANION GAP SERPL CALC-SCNC: 8 MMOL/L (ref 8–16)
AST SERPL-CCNC: 11 U/L (ref 10–40)
BILIRUB SERPL-MCNC: 0.1 MG/DL (ref 0.1–1)
BUN SERPL-MCNC: 5 MG/DL (ref 8–23)
CALCIUM SERPL-MCNC: 8.8 MG/DL (ref 8.7–10.5)
CHLORIDE SERPL-SCNC: 107 MMOL/L (ref 95–110)
CO2 SERPL-SCNC: 29 MMOL/L (ref 23–29)
CREAT SERPL-MCNC: 1.1 MG/DL (ref 0.5–1.4)
ERYTHROCYTE [DISTWIDTH] IN BLOOD BY AUTOMATED COUNT: 16.3 % (ref 11.5–14.5)
EST. GFR  (AFRICAN AMERICAN): >60 ML/MIN/1.73 M^2
EST. GFR  (NON AFRICAN AMERICAN): 53.2 ML/MIN/1.73 M^2
GLUCOSE SERPL-MCNC: 101 MG/DL (ref 70–110)
HCT VFR BLD AUTO: 27.9 % (ref 37–48.5)
HGB BLD-MCNC: 8.5 G/DL (ref 12–16)
IMM GRANULOCYTES # BLD AUTO: 0.13 K/UL (ref 0–0.04)
MCH RBC QN AUTO: 29 PG (ref 27–31)
MCHC RBC AUTO-ENTMCNC: 30.5 G/DL (ref 32–36)
MCV RBC AUTO: 95 FL (ref 82–98)
NEUTROPHILS # BLD AUTO: 5.8 K/UL (ref 1.8–7.7)
PLATELET # BLD AUTO: 217 K/UL (ref 150–350)
PMV BLD AUTO: 10.3 FL (ref 9.2–12.9)
POTASSIUM SERPL-SCNC: 3.3 MMOL/L (ref 3.5–5.1)
PROT SERPL-MCNC: 6.7 G/DL (ref 6–8.4)
RBC # BLD AUTO: 2.93 M/UL (ref 4–5.4)
SODIUM SERPL-SCNC: 144 MMOL/L (ref 136–145)
WBC # BLD AUTO: 8.27 K/UL (ref 3.9–12.7)

## 2020-01-08 PROCEDURE — 85027 COMPLETE CBC AUTOMATED: CPT

## 2020-01-08 PROCEDURE — 36415 COLL VENOUS BLD VENIPUNCTURE: CPT

## 2020-01-08 PROCEDURE — 99214 OFFICE O/P EST MOD 30 MIN: CPT | Mod: PBBFAC | Performed by: INTERNAL MEDICINE

## 2020-01-08 PROCEDURE — 80053 COMPREHEN METABOLIC PANEL: CPT

## 2020-01-08 PROCEDURE — 99215 OFFICE O/P EST HI 40 MIN: CPT | Mod: S$PBB,,, | Performed by: INTERNAL MEDICINE

## 2020-01-08 PROCEDURE — 99999 PR PBB SHADOW E&M-EST. PATIENT-LVL IV: CPT | Mod: PBBFAC,,, | Performed by: INTERNAL MEDICINE

## 2020-01-08 PROCEDURE — 99215 PR OFFICE/OUTPT VISIT, EST, LEVL V, 40-54 MIN: ICD-10-PCS | Mod: S$PBB,,, | Performed by: INTERNAL MEDICINE

## 2020-01-08 PROCEDURE — 99999 PR PBB SHADOW E&M-EST. PATIENT-LVL IV: ICD-10-PCS | Mod: PBBFAC,,, | Performed by: INTERNAL MEDICINE

## 2020-01-08 RX ORDER — DIPHENHYDRAMINE HYDROCHLORIDE 50 MG/ML
50 INJECTION INTRAMUSCULAR; INTRAVENOUS ONCE AS NEEDED
Status: CANCELLED | OUTPATIENT
Start: 2020-01-09

## 2020-01-08 RX ORDER — IBUPROFEN 800 MG/1
800 TABLET ORAL EVERY 8 HOURS PRN
COMMUNITY
Start: 2019-12-23

## 2020-01-08 RX ORDER — HEPARIN 100 UNIT/ML
500 SYRINGE INTRAVENOUS
Status: CANCELLED | OUTPATIENT
Start: 2020-01-09

## 2020-01-08 RX ORDER — SODIUM CHLORIDE 0.9 % (FLUSH) 0.9 %
10 SYRINGE (ML) INJECTION
Status: CANCELLED | OUTPATIENT
Start: 2020-01-09

## 2020-01-08 RX ORDER — FAMOTIDINE 10 MG/ML
20 INJECTION INTRAVENOUS
Status: CANCELLED | OUTPATIENT
Start: 2020-01-09

## 2020-01-08 RX ORDER — EPINEPHRINE 0.3 MG/.3ML
0.3 INJECTION SUBCUTANEOUS ONCE AS NEEDED
Status: CANCELLED | OUTPATIENT
Start: 2020-01-09

## 2020-01-08 NOTE — PROGRESS NOTES
Subjective:       Patient ID: Briseyda Rojas is a 64 y.o. female.    Chief Complaint: No chief complaint on file.    Follow-up        Briseyda Rojas returns today for follow up.  She is due for her 1st cycle of neoadjuvant Taxol tomorrow.  She is being treated on a Q 2 W schedule.  She has been premedicated with oral dexamethasone 20 mg 12 and 6 hr prior.  Briefly, she  is a 64 y.o. female whom I saw initially on October 22, 2019 for a recently diagnosed triple negative right breast cancer.  She underwent mammography and ultrasound in early September of this year which showed architectural distortion in the right upper lateral breast and pleomorphic calcifications in the right upper mid breast.  Of note, this was her first mammogram ever.  Ultrasound of the axilla was also performed which showed a prominent lymph node.  She underwent biopsy of all 3 lesions. The right upper lateral lesion returned as DCIS with comedo necrosis.  The right upper mid breast came back as normal.  The right axillary lymph node returned as infiltrating ductal carcinoma, ER negative, [place] negative, HER2 equivocal but negative by FISH.  She was been seen by Dr. Raya and she was referred for consideration of neoadjuvant chemotherapy.  She has so far completed 4 cycles of neoadjuvant Adriamycin and cyclophosphamide and she is due to begin Taxol today.  As mentioned above she has been premedicated with oral dexamethasone.  Her CBC from earlier today shows a WBC count of 8,200 /mm3, Hg 8.5 gr/dl, Ht 27.9 % and platelets 217,000 /mm3.  Bilirubin is 0.1 mg/dl.         Review of Systems    Overall she feels OK. She tolerated cycle 4 quite well.  She again experienced some bone pains from the Neulasta.  ECOG PS  Is 1.   She denies any depression, easy bruising, chills, night  sweats, weight loss, fever, nausea, vomiting, diarrhea, constipation, diplopia,  blurred vision, headache, chest pain, palpitations, shortness of breath, left breast pain, abdominal  pain, extremity pain, or difficulty ambulating.  The remainder of the ten-point ROS, including general, skin, lymph, H/N, cardiorespiratory, GI, , Neuro, Endocrine, and psychiatric is negative.     Objective:      Physical Exam      She is alert, oriented to time, place, person, pleasant, well      nourished, in no acute physical distress.                               VITAL SIGNS:  Reviewed                                      HEENT:  Normal.  There are no nasal, oral, lip, gingival, auricular, lid,    or conjunctival lesions.  Mucosae are moist and pink, and there is no        thrush.  Pupils are equal, reactive to light and accommodation.              Extraocular muscle movements are intact.  Dentition is poor with several decayed teeth.  There is no frontal or maxillary tenderness.                                     NECK:  Supple without JVD, adenopathy, or thyromegaly.                       LUNGS:  Clear to auscultation without wheezing, rales, or rhonchi.           CARDIOVASCULAR:  Reveals an S1, S2, no murmurs, no rubs, no gallops.         ABDOMEN:  Soft, nontender, without organomegaly.  Bowel sounds are    present.                                                                     EXTREMITIES:  No cyanosis, clubbing, or edema.                               BREASTS:  the right breast has no discrete masses.  There is an right axillary mass, approximately 1 cm, which is much smaller compared to 8 weeks ago when chemotherapy was initiated..      A left sided portacth is again identified.                               LYMPHATIC:  There is no cervical, left axillary, or supraclavicular adenopathy.   SKIN:  Warm and moist, without petechiae, rashes, induration, or ecchymoses.           NEUROLOGIC:  DTRs are 0-1+ bilaterally, symmetrical, motor function is 5/5,  and cranial nerves are  within normal limits.    Assessment:       1. Carcinoma of axillary tail of right breast in female, estrogen receptor negative       2.    Biopsy proven axillary metastases.  3.       Chemotherapy induced anemia  Plan:         I had a long discussion with her.  She will proceed with the 1st cycle of neoadjuvant paclitaxel tomorrow.  She will be premedicated with oral dexamethasone.  There is no need to administer IV dexamethasone during her infusion.  She will also receive Neulasta the day after tomorrow, observe neutropenic precautions, and see me in 2 weeks with labs.    At her request we will ask our  to reach out to her and help her with the disability papers.    Her multiple questions were answered to her satisfaction.

## 2020-01-09 ENCOUNTER — DOCUMENTATION ONLY (OUTPATIENT)
Dept: HEMATOLOGY/ONCOLOGY | Facility: CLINIC | Age: 65
End: 2020-01-09

## 2020-01-09 NOTE — PROGRESS NOTES
Received referral from the clinic that the patient needed assistance with disability paperwork. Attempted to contact the patient on her cell phone but she did not answer and there is no voice mail set up. I called the other number that was listed which was her 's cell phone. He was unsure where she was. He asked that I call her cell phone again between 1-2 because she will be at an appointment at that time. Will attempt to reach her then.

## 2020-01-10 ENCOUNTER — DOCUMENTATION ONLY (OUTPATIENT)
Dept: HEMATOLOGY/ONCOLOGY | Facility: CLINIC | Age: 65
End: 2020-01-10

## 2020-01-10 NOTE — PROGRESS NOTES
Reached out to patient again today. She stated that she is already receiving social security but feels that it is not enough. She was wanting assistance with getting her payments increased. Discussed that the payments were based off of what she has paid into the system. Those payments would not increase. I asked her about what areas she was struggling with the most financially. She stated that she is not struggling with day to day bills. She is interested in applying for food stamps. Told her that we could meet at her next appointment and I would bring her the application along with other applications that could assist her with gas cards or [employer] cards for food. Will meet her on 1/22 after her apt with the MD. She is in agreement.

## 2020-01-22 ENCOUNTER — OFFICE VISIT (OUTPATIENT)
Dept: HEMATOLOGY/ONCOLOGY | Facility: CLINIC | Age: 65
End: 2020-01-22
Payer: MEDICAID

## 2020-01-22 ENCOUNTER — LAB VISIT (OUTPATIENT)
Dept: LAB | Facility: HOSPITAL | Age: 65
End: 2020-01-22
Attending: INTERNAL MEDICINE
Payer: MEDICAID

## 2020-01-22 ENCOUNTER — TELEPHONE (OUTPATIENT)
Dept: HEMATOLOGY/ONCOLOGY | Facility: CLINIC | Age: 65
End: 2020-01-22

## 2020-01-22 VITALS
WEIGHT: 117.75 LBS | BODY MASS INDEX: 18.92 KG/M2 | RESPIRATION RATE: 18 BRPM | DIASTOLIC BLOOD PRESSURE: 86 MMHG | SYSTOLIC BLOOD PRESSURE: 189 MMHG | TEMPERATURE: 99 F | OXYGEN SATURATION: 100 % | HEIGHT: 66 IN | HEART RATE: 74 BPM

## 2020-01-22 DIAGNOSIS — C50.611 CARCINOMA OF AXILLARY TAIL OF RIGHT BREAST IN FEMALE, ESTROGEN RECEPTOR NEGATIVE: ICD-10-CM

## 2020-01-22 DIAGNOSIS — Z17.1 CARCINOMA OF AXILLARY TAIL OF RIGHT BREAST IN FEMALE, ESTROGEN RECEPTOR NEGATIVE: ICD-10-CM

## 2020-01-22 LAB
ALBUMIN SERPL BCP-MCNC: 3.6 G/DL (ref 3.5–5.2)
ALP SERPL-CCNC: 118 U/L (ref 55–135)
ALT SERPL W/O P-5'-P-CCNC: 7 U/L (ref 10–44)
ANION GAP SERPL CALC-SCNC: 8 MMOL/L (ref 8–16)
AST SERPL-CCNC: 15 U/L (ref 10–40)
BILIRUB SERPL-MCNC: 0.2 MG/DL (ref 0.1–1)
BUN SERPL-MCNC: 12 MG/DL (ref 8–23)
CALCIUM SERPL-MCNC: 9.3 MG/DL (ref 8.7–10.5)
CHLORIDE SERPL-SCNC: 105 MMOL/L (ref 95–110)
CO2 SERPL-SCNC: 30 MMOL/L (ref 23–29)
CREAT SERPL-MCNC: 1.2 MG/DL (ref 0.5–1.4)
ERYTHROCYTE [DISTWIDTH] IN BLOOD BY AUTOMATED COUNT: 18.1 % (ref 11.5–14.5)
EST. GFR  (AFRICAN AMERICAN): 55.2 ML/MIN/1.73 M^2
EST. GFR  (NON AFRICAN AMERICAN): 47.9 ML/MIN/1.73 M^2
GLUCOSE SERPL-MCNC: 109 MG/DL (ref 70–110)
HCT VFR BLD AUTO: 27.8 % (ref 37–48.5)
HGB BLD-MCNC: 8.6 G/DL (ref 12–16)
IMM GRANULOCYTES # BLD AUTO: 1.47 K/UL (ref 0–0.04)
MCH RBC QN AUTO: 29.9 PG (ref 27–31)
MCHC RBC AUTO-ENTMCNC: 30.9 G/DL (ref 32–36)
MCV RBC AUTO: 97 FL (ref 82–98)
NEUTROPHILS # BLD AUTO: 32.2 K/UL (ref 1.8–7.7)
PLATELET # BLD AUTO: 231 K/UL (ref 150–350)
PMV BLD AUTO: 10.4 FL (ref 9.2–12.9)
POTASSIUM SERPL-SCNC: 3.4 MMOL/L (ref 3.5–5.1)
PROT SERPL-MCNC: 6.9 G/DL (ref 6–8.4)
RBC # BLD AUTO: 2.88 M/UL (ref 4–5.4)
SODIUM SERPL-SCNC: 143 MMOL/L (ref 136–145)
WBC # BLD AUTO: 39.42 K/UL (ref 3.9–12.7)

## 2020-01-22 PROCEDURE — 99999 PR PBB SHADOW E&M-EST. PATIENT-LVL III: ICD-10-PCS | Mod: PBBFAC,,, | Performed by: INTERNAL MEDICINE

## 2020-01-22 PROCEDURE — 80053 COMPREHEN METABOLIC PANEL: CPT

## 2020-01-22 PROCEDURE — 99213 OFFICE O/P EST LOW 20 MIN: CPT | Mod: PBBFAC | Performed by: INTERNAL MEDICINE

## 2020-01-22 PROCEDURE — 36415 COLL VENOUS BLD VENIPUNCTURE: CPT

## 2020-01-22 PROCEDURE — 99999 PR PBB SHADOW E&M-EST. PATIENT-LVL III: CPT | Mod: PBBFAC,,, | Performed by: INTERNAL MEDICINE

## 2020-01-22 PROCEDURE — 85027 COMPLETE CBC AUTOMATED: CPT

## 2020-01-22 PROCEDURE — 99215 OFFICE O/P EST HI 40 MIN: CPT | Mod: S$PBB,,, | Performed by: INTERNAL MEDICINE

## 2020-01-22 PROCEDURE — 99215 PR OFFICE/OUTPT VISIT, EST, LEVL V, 40-54 MIN: ICD-10-PCS | Mod: S$PBB,,, | Performed by: INTERNAL MEDICINE

## 2020-01-22 RX ORDER — OXYCODONE HYDROCHLORIDE 5 MG/1
TABLET ORAL
Qty: 60 TABLET | Refills: 0 | Status: SHIPPED | OUTPATIENT
Start: 2020-01-22 | End: 2020-02-19 | Stop reason: SDUPTHER

## 2020-01-22 RX ORDER — HEPARIN 100 UNIT/ML
500 SYRINGE INTRAVENOUS
Status: CANCELLED | OUTPATIENT
Start: 2020-01-23

## 2020-01-22 RX ORDER — EPINEPHRINE 0.3 MG/.3ML
0.3 INJECTION SUBCUTANEOUS ONCE AS NEEDED
Status: CANCELLED | OUTPATIENT
Start: 2020-01-23

## 2020-01-22 RX ORDER — DIPHENHYDRAMINE HYDROCHLORIDE 50 MG/ML
50 INJECTION INTRAMUSCULAR; INTRAVENOUS ONCE AS NEEDED
Status: CANCELLED | OUTPATIENT
Start: 2020-01-23

## 2020-01-22 RX ORDER — SODIUM CHLORIDE 0.9 % (FLUSH) 0.9 %
10 SYRINGE (ML) INJECTION
Status: CANCELLED | OUTPATIENT
Start: 2020-01-23

## 2020-01-22 RX ORDER — FAMOTIDINE 10 MG/ML
20 INJECTION INTRAVENOUS
Status: CANCELLED | OUTPATIENT
Start: 2020-01-23

## 2020-01-22 NOTE — TELEPHONE ENCOUNTER
Contacted pharmacy to confirm that this medication is appropriate and is being used for treatment of breast cancer diagnosis and treatment.   He voiced understanding.   Will continue to fill  Advised if the patient and spouse had concerns or patient didn't tolerate the medication well, to have them contact our office.

## 2020-01-22 NOTE — PROGRESS NOTES
Subjective:       Patient ID: Briseyda Rojas is a 64 y.o. female.    Chief Complaint: Carcinoma of axillary tail of right breast in female, estrog    Follow-up        Briseyda Rojas returns today for follow up.  She is due for her second cycle of neoadjuvant Taxol tomorrow.  She is being treated on a Q 2 W schedule.  She will be premedicated with oral dexamethasone 20 mg 12 and 6 hr prior to her chemotherapy, which is scheduled for tomorrow..  Briefly, she  is a 64 y.o. female whom I saw initially on October 22, 2019 for a recently diagnosed triple negative right breast cancer.  She underwent mammography and ultrasound in early September of this year which showed architectural distortion in the right upper lateral breast and pleomorphic calcifications in the right upper mid breast.  Of note, this was her first mammogram ever.  Ultrasound of the axilla was also performed which showed a prominent lymph node.  She underwent biopsy of all 3 lesions. The right upper lateral lesion returned as DCIS with comedo necrosis.  The right upper mid breast came back as normal.  The right axillary lymph node returned as infiltrating ductal carcinoma, ER negative, DC negative, HER2 equivocal but negative by FISH.  She was been seen by Dr. Raya and she was referred for consideration of neoadjuvant chemotherapy.  She has so far completed 4 cycles of neoadjuvant Adriamycin and cyclophosphamide and started Taxol 2 weeks ago.  She is due for cycle 2 tomorrow.  Her CBC from earlier today shows a WBC count of 39,400 /mm3, Hg 8.6 gr/dl, Ht 27.8 % and platelets 231,000 /mm3.  Bilirubin is 0.2 mg/dl.         Review of Systems    Overall she feels OK. She tolerated cycle 1 of Taxol fairly, and she states that she experienced severe leg and bone pains from the Neulasta.  Her symptoms lasted for approximately 5 days.  She is asking for an analgesic.  ECOG PS  is 1.   She denies any depression, easy bruising, chills, night  sweats, weight loss,  fever, nausea, vomiting, diarrhea, constipation, diplopia,  blurred vision, headache, chest pain, palpitations, shortness of breath, left breast pain, abdominal pain, extremity pain, or difficulty ambulating.  The remainder of the ten-point ROS, including general, skin, lymph, H/N, cardiorespiratory, GI, , Neuro, Endocrine, and psychiatric is negative.     Objective:      Physical Exam      She is alert, oriented to time, place, person, pleasant, well      nourished, in no acute physical distress.                               VITAL SIGNS:  Reviewed                                      HEENT:  Normal.  There are no nasal, oral, lip, gingival, auricular, lid,    or conjunctival lesions.  Mucosae are moist and pink, and there is no        thrush.  Pupils are equal, reactive to light and accommodation.              Extraocular muscle movements are intact.  Dentition is poor with several decayed teeth.  There is no frontal or maxillary tenderness.                                     NECK:  Supple without JVD, adenopathy, or thyromegaly.                       LUNGS:  Clear to auscultation without wheezing, rales, or rhonchi.           CARDIOVASCULAR:  Reveals an S1, S2, no murmurs, no rubs, no gallops.         ABDOMEN:  Soft, nontender, without organomegaly.  Bowel sounds are    present.                                                                     EXTREMITIES:  No cyanosis, clubbing, or edema.                               BREASTS:  the right breast has no discrete masses.  There is an right axillary mass, approximately 1 cm, which is unchanged from her last visit 2 weeks ago.       A left sided portacth is again identified.      There are no masses in the left breast.                             LYMPHATIC:  There is no cervical, left axillary, or supraclavicular adenopathy.   SKIN:  Warm and moist, without petechiae, rashes, induration, or ecchymoses.           NEUROLOGIC:  DTRs are 0-1+ bilaterally,  symmetrical, motor function is 5/5,  and cranial nerves are  within normal limits.    Assessment:       1. Carcinoma of axillary tail of right breast in female, estrogen receptor negative      2.    Biopsy proven axillary metastases.  3.       Chemotherapy induced anemia  Plan:         I had a long discussion with her.  She will proceed with the second cycle of neoadjuvant paclitaxel tomorrow.  She will be premedicated with oral dexamethasone.  There is no need to administer IV dexamethasone during her infusion.  She will also receive Neulasta the day after tomorrow, observe neutropenic precautions, and see me in 2 weeks with labs.  A prescription for oxycodone was sent to her pharmacy.  At her request we will ask our  to reach out to her and help her with the disability papers.    Her multiple questions were answered to her satisfaction.

## 2020-01-22 NOTE — TELEPHONE ENCOUNTER
----- Message from Kaylyn Gomes sent at 1/22/2020  1:01 PM CST -----  Contact: [facility]  Reason; Calling pertaining to pain meds pharmacist ask for a diagnosis pt  don't want her to  script he says he is concerned.Please call pharmacy    Communiction: [phone]

## 2020-01-27 PROBLEM — I16.1 HYPERTENSIVE EMERGENCY: Status: ACTIVE | Noted: 2020-01-27

## 2020-01-28 PROBLEM — R82.90 ABNORMAL URINALYSIS: Status: ACTIVE | Noted: 2020-01-28

## 2020-02-03 ENCOUNTER — DOCUMENTATION ONLY (OUTPATIENT)
Dept: HEMATOLOGY/ONCOLOGY | Facility: CLINIC | Age: 65
End: 2020-02-03

## 2020-02-03 NOTE — PROGRESS NOTES
Met wit patient and  in the clinic. Discussed resources with them and them fill out applications for Patient assistance [employer] and [employer]. Patient's  will fax me the supportive documentation needed to complete the [employer] application. Received the supportive documentation needed. Obtained MD signature and faxed the referral to [employer]. Sent the request for patient assistance for $1,000.

## 2020-02-05 ENCOUNTER — OFFICE VISIT (OUTPATIENT)
Dept: HEMATOLOGY/ONCOLOGY | Facility: CLINIC | Age: 65
End: 2020-02-05
Payer: MEDICAID

## 2020-02-05 ENCOUNTER — LAB VISIT (OUTPATIENT)
Dept: LAB | Facility: HOSPITAL | Age: 65
End: 2020-02-05
Attending: INTERNAL MEDICINE
Payer: MEDICAID

## 2020-02-05 VITALS
SYSTOLIC BLOOD PRESSURE: 142 MMHG | HEART RATE: 81 BPM | RESPIRATION RATE: 16 BRPM | BODY MASS INDEX: 18.03 KG/M2 | WEIGHT: 112.19 LBS | DIASTOLIC BLOOD PRESSURE: 93 MMHG | OXYGEN SATURATION: 94 % | TEMPERATURE: 98 F | HEIGHT: 66 IN

## 2020-02-05 DIAGNOSIS — Z17.1 CARCINOMA OF AXILLARY TAIL OF RIGHT BREAST IN FEMALE, ESTROGEN RECEPTOR NEGATIVE: Primary | ICD-10-CM

## 2020-02-05 DIAGNOSIS — D64.81 ANEMIA ASSOCIATED WITH CHEMOTHERAPY: ICD-10-CM

## 2020-02-05 DIAGNOSIS — T45.1X5A ANEMIA ASSOCIATED WITH CHEMOTHERAPY: ICD-10-CM

## 2020-02-05 DIAGNOSIS — C50.611 CARCINOMA OF AXILLARY TAIL OF RIGHT BREAST IN FEMALE, ESTROGEN RECEPTOR NEGATIVE: ICD-10-CM

## 2020-02-05 DIAGNOSIS — Z17.1 CARCINOMA OF AXILLARY TAIL OF RIGHT BREAST IN FEMALE, ESTROGEN RECEPTOR NEGATIVE: ICD-10-CM

## 2020-02-05 DIAGNOSIS — C50.611 CARCINOMA OF AXILLARY TAIL OF RIGHT BREAST IN FEMALE, ESTROGEN RECEPTOR NEGATIVE: Primary | ICD-10-CM

## 2020-02-05 DIAGNOSIS — R63.0 ANOREXIA: ICD-10-CM

## 2020-02-05 LAB
ALBUMIN SERPL BCP-MCNC: 3.4 G/DL (ref 3.5–5.2)
ALP SERPL-CCNC: 119 U/L (ref 55–135)
ALT SERPL W/O P-5'-P-CCNC: 12 U/L (ref 10–44)
ANION GAP SERPL CALC-SCNC: 9 MMOL/L (ref 8–16)
AST SERPL-CCNC: 18 U/L (ref 10–40)
BILIRUB SERPL-MCNC: 0.3 MG/DL (ref 0.1–1)
BUN SERPL-MCNC: 9 MG/DL (ref 8–23)
CALCIUM SERPL-MCNC: 9.2 MG/DL (ref 8.7–10.5)
CHLORIDE SERPL-SCNC: 107 MMOL/L (ref 95–110)
CO2 SERPL-SCNC: 28 MMOL/L (ref 23–29)
CREAT SERPL-MCNC: 1 MG/DL (ref 0.5–1.4)
ERYTHROCYTE [DISTWIDTH] IN BLOOD BY AUTOMATED COUNT: 18.2 % (ref 11.5–14.5)
EST. GFR  (AFRICAN AMERICAN): >60 ML/MIN/1.73 M^2
EST. GFR  (NON AFRICAN AMERICAN): 59.7 ML/MIN/1.73 M^2
GLUCOSE SERPL-MCNC: 116 MG/DL (ref 70–110)
HCT VFR BLD AUTO: 31.8 % (ref 37–48.5)
HGB BLD-MCNC: 9.5 G/DL (ref 12–16)
IMM GRANULOCYTES # BLD AUTO: 0.09 K/UL (ref 0–0.04)
MCH RBC QN AUTO: 29.1 PG (ref 27–31)
MCHC RBC AUTO-ENTMCNC: 29.9 G/DL (ref 32–36)
MCV RBC AUTO: 98 FL (ref 82–98)
NEUTROPHILS # BLD AUTO: 8.7 K/UL (ref 1.8–7.7)
PLATELET # BLD AUTO: 278 K/UL (ref 150–350)
PMV BLD AUTO: 10.3 FL (ref 9.2–12.9)
POTASSIUM SERPL-SCNC: 3.8 MMOL/L (ref 3.5–5.1)
PROT SERPL-MCNC: 7 G/DL (ref 6–8.4)
RBC # BLD AUTO: 3.26 M/UL (ref 4–5.4)
SODIUM SERPL-SCNC: 144 MMOL/L (ref 136–145)
WBC # BLD AUTO: 12.79 K/UL (ref 3.9–12.7)

## 2020-02-05 PROCEDURE — 99999 PR PBB SHADOW E&M-EST. PATIENT-LVL III: ICD-10-PCS | Mod: PBBFAC,,, | Performed by: NURSE PRACTITIONER

## 2020-02-05 PROCEDURE — 99999 PR PBB SHADOW E&M-EST. PATIENT-LVL III: CPT | Mod: PBBFAC,,, | Performed by: NURSE PRACTITIONER

## 2020-02-05 PROCEDURE — 85027 COMPLETE CBC AUTOMATED: CPT

## 2020-02-05 PROCEDURE — 80053 COMPREHEN METABOLIC PANEL: CPT

## 2020-02-05 PROCEDURE — 99214 PR OFFICE/OUTPT VISIT, EST, LEVL IV, 30-39 MIN: ICD-10-PCS | Mod: S$PBB,,, | Performed by: NURSE PRACTITIONER

## 2020-02-05 PROCEDURE — 36415 COLL VENOUS BLD VENIPUNCTURE: CPT

## 2020-02-05 PROCEDURE — 99213 OFFICE O/P EST LOW 20 MIN: CPT | Mod: PBBFAC | Performed by: NURSE PRACTITIONER

## 2020-02-05 PROCEDURE — 99214 OFFICE O/P EST MOD 30 MIN: CPT | Mod: S$PBB,,, | Performed by: NURSE PRACTITIONER

## 2020-02-05 RX ORDER — CYPROHEPTADINE HYDROCHLORIDE 4 MG/1
4 TABLET ORAL 2 TIMES DAILY PRN
Qty: 60 TABLET | Refills: 0 | Status: SHIPPED | OUTPATIENT
Start: 2020-02-05 | End: 2022-01-18

## 2020-02-05 NOTE — PROGRESS NOTES
Subjective:       Patient ID: Briseyda Rojas is a 64 y.o. female.    Chief Complaint: Follow-up       Briseyda Rojas returns today for follow up.  She is due for her third ycle of neoadjuvant Taxol tomorrow.  She is being treated on a Q 2 W schedule.  She will be premedicated with oral dexamethasone 20 mg 12 and 6 hr prior to her chemotherapy, which is scheduled for tomorrow.      Briefly, she  is a 64 y.o. female whom I saw initially on October 22, 2019 for a recently diagnosed triple negative right breast cancer.  She underwent mammography and ultrasound in early September of this year which showed architectural distortion in the right upper lateral breast and pleomorphic calcifications in the right upper mid breast.  Of note, this was her first mammogram ever.  Ultrasound of the axilla was also performed which showed a prominent lymph node.  She underwent biopsy of all 3 lesions. The right upper lateral lesion returned as DCIS with comedo necrosis.  The right upper mid breast came back as normal.  The right axillary lymph node returned as infiltrating ductal carcinoma, ER negative, [place] negative, HER2 equivocal but negative by FISH.  She was been seen by Dr. Raya and she was referred for consideration of neoadjuvant chemotherapy.  She has so far completed 4 cycles of neoadjuvant Adriamycin and cyclophosphamide and started Taxol  3  weeks ago.  She is due for cycle 3 tomorrow.        Review of Systems    Overall she feels fair. Decreased appetite and requesting medication to help. Altered taste. Tingling in fingers comes and goes- no worse. +diarrhea on occasion- no supportive measures taken. No other complaints.ECOG PS  is 1.   She denies any depression, easy bruising, chills, night  sweats,  fever, nausea, vomiting,  constipation, diplopia,  blurred vision, headache, chest pain, palpitations, shortness of breath, left breast pain, abdominal pain, extremity pain, or difficulty ambulating.  The remainder of the  ten-point ROS, including general, skin, lymph, H/N, cardiorespiratory, GI, , Neuro, Endocrine, and psychiatric is negative.     Objective:      Physical Exam      She is alert, oriented to time, place, person, pleasant, well      nourished, in no acute physical distress. Presents with , sister and niece.                                VITAL SIGNS:  Reviewed                                      HEENT:  Normal.  There are no nasal, oral, lip, gingival, auricular, lid,    or conjunctival lesions.  Mucosae are moist and pink, and there is no        thrush.  Pupils are equal, reactive to light and accommodation.              Extraocular muscle movements are intact.  Dentition is poor with several decayed teeth.  There is no frontal or maxillary tenderness.                                     NECK:  Supple without JVD, adenopathy, or thyromegaly.                       LUNGS:  Clear to auscultation without wheezing, rales, or rhonchi.           CARDIOVASCULAR:  Reveals an S1, S2, no murmurs, no rubs, no gallops.         ABDOMEN:  Soft, nontender, without organomegaly.  Bowel sounds are    present.                                                                     EXTREMITIES:  No cyanosis, clubbing, or edema.                               BREASTS:  the right breast has no discrete masses.  There is an right axillary mass, approximately 1 cm, which is unchanged from her last visit.       A left sided portacath is again identified.      There are no masses in the left breast.                             LYMPHATIC:  There is no cervical, left axillary, or supraclavicular adenopathy.   SKIN:  Warm and moist, without petechiae, rashes, induration, or ecchymoses.           NEUROLOGIC:  DTRs are 0-1+ bilaterally, symmetrical, motor function is 5/5,  and cranial nerves are  within normal limits.    Results for orders placed or performed in visit on 02/05/20   Comprehensive metabolic panel   Result Value Ref Range     Sodium 144 136 - 145 mmol/L    Potassium 3.8 3.5 - 5.1 mmol/L    Chloride 107 95 - 110 mmol/L    CO2 28 23 - 29 mmol/L    Glucose 116 (H) 70 - 110 mg/dL    BUN, Bld 9 8 - 23 mg/dL    Creatinine 1.0 0.5 - 1.4 mg/dL    Calcium 9.2 8.7 - 10.5 mg/dL    Total Protein 7.0 6.0 - 8.4 g/dL    Albumin 3.4 (L) 3.5 - 5.2 g/dL    Total Bilirubin 0.3 0.1 - 1.0 mg/dL    Alkaline Phosphatase 119 55 - 135 U/L    AST 18 10 - 40 U/L    ALT 12 10 - 44 U/L    Anion Gap 9 8 - 16 mmol/L    eGFR if African American >60.0 >60 mL/min/1.73 m^2    eGFR if non  59.7 (A) >60 mL/min/1.73 m^2   CBC Oncology   Result Value Ref Range    WBC 12.79 (H) 3.90 - 12.70 K/uL    RBC 3.26 (L) 4.00 - 5.40 M/uL    Hemoglobin 9.5 (L) 12.0 - 16.0 g/dL    Hematocrit 31.8 (L) 37.0 - 48.5 %    Mean Corpuscular Volume 98 82 - 98 fL    Mean Corpuscular Hemoglobin 29.1 27.0 - 31.0 pg    Mean Corpuscular Hemoglobin Conc 29.9 (L) 32.0 - 36.0 g/dL    RDW 18.2 (H) 11.5 - 14.5 %    Platelets 278 150 - 350 K/uL    MPV 10.3 9.2 - 12.9 fL    Gran # (ANC) 8.7 (H) 1.8 - 7.7 K/uL    Immature Grans (Abs) 0.09 (H) 0.00 - 0.04 K/uL       Assessment:       1. Carcinoma of axillary tail of right breast in female, estrogen receptor negative    2. Anemia associated with chemotherapy    3. Anorexia     4.    Biopsy proven axillary metastases.    Plan:         Doing well, clinically stable.   Labs reviewed. Anemia parameters stable.   Proceed with C3 neoadjuvant paclitaxel tomorrow with neulasta support.   She will be premedicated with oral dexamethasone.  There is no need to administer IV dexamethasone during her infusion.    RTC in 2 weeks to see Dr. De Leon with cbc, cmp, and for C4 Paclitaxel with neulasta day 2.   Rx Periactin for appetite.     Patient is in agreement with the proposed treatment plan. All questions were answered to the patient's satisfaction. Pt knows to call clinic for any new or worsening symptoms and if anything is needed before the next clinic  visit.      Cr Krishnamurthy, ANTP-C  Hematology & Oncology  [address] [place] [address]  ph. [phone]  Fax. [phone]     I spent 30 minutes (face to face) with the patient, more than 50% was in counseling and coordination of care as detailed above.

## 2020-02-06 RX ORDER — DIPHENHYDRAMINE HYDROCHLORIDE 50 MG/ML
50 INJECTION INTRAMUSCULAR; INTRAVENOUS ONCE AS NEEDED
Status: CANCELLED | OUTPATIENT
Start: 2020-02-06

## 2020-02-06 RX ORDER — SODIUM CHLORIDE 0.9 % (FLUSH) 0.9 %
10 SYRINGE (ML) INJECTION
Status: CANCELLED | OUTPATIENT
Start: 2020-02-06

## 2020-02-06 RX ORDER — FAMOTIDINE 10 MG/ML
20 INJECTION INTRAVENOUS
Status: CANCELLED | OUTPATIENT
Start: 2020-02-06

## 2020-02-06 RX ORDER — HEPARIN 100 UNIT/ML
500 SYRINGE INTRAVENOUS
Status: CANCELLED | OUTPATIENT
Start: 2020-02-06

## 2020-02-06 RX ORDER — EPINEPHRINE 0.3 MG/.3ML
0.3 INJECTION SUBCUTANEOUS ONCE AS NEEDED
Status: CANCELLED | OUTPATIENT
Start: 2020-02-06

## 2020-02-19 ENCOUNTER — INFUSION (OUTPATIENT)
Dept: INFUSION THERAPY | Facility: HOSPITAL | Age: 65
End: 2020-02-19
Attending: INTERNAL MEDICINE
Payer: MEDICAID

## 2020-02-19 ENCOUNTER — OFFICE VISIT (OUTPATIENT)
Dept: HEMATOLOGY/ONCOLOGY | Facility: CLINIC | Age: 65
End: 2020-02-19
Payer: MEDICAID

## 2020-02-19 VITALS
BODY MASS INDEX: 15.98 KG/M2 | HEIGHT: 66 IN | RESPIRATION RATE: 18 BRPM | SYSTOLIC BLOOD PRESSURE: 154 MMHG | DIASTOLIC BLOOD PRESSURE: 84 MMHG | TEMPERATURE: 99 F | OXYGEN SATURATION: 99 % | WEIGHT: 99.44 LBS | HEART RATE: 93 BPM

## 2020-02-19 VITALS
DIASTOLIC BLOOD PRESSURE: 65 MMHG | SYSTOLIC BLOOD PRESSURE: 139 MMHG | HEART RATE: 78 BPM | RESPIRATION RATE: 18 BRPM | TEMPERATURE: 98 F

## 2020-02-19 DIAGNOSIS — C50.911 MALIGNANT NEOPLASM OF RIGHT BREAST IN FEMALE, ESTROGEN RECEPTOR NEGATIVE, UNSPECIFIED SITE OF BREAST: Primary | ICD-10-CM

## 2020-02-19 DIAGNOSIS — C50.911 BREAST CANCER, RIGHT BREAST: ICD-10-CM

## 2020-02-19 DIAGNOSIS — Z17.1 MALIGNANT NEOPLASM OF RIGHT BREAST IN FEMALE, ESTROGEN RECEPTOR NEGATIVE, UNSPECIFIED SITE OF BREAST: Primary | ICD-10-CM

## 2020-02-19 DIAGNOSIS — C50.611 CARCINOMA OF AXILLARY TAIL OF RIGHT BREAST IN FEMALE, ESTROGEN RECEPTOR NEGATIVE: ICD-10-CM

## 2020-02-19 DIAGNOSIS — D64.81 ANEMIA ASSOCIATED WITH CHEMOTHERAPY: ICD-10-CM

## 2020-02-19 DIAGNOSIS — Z17.1 MALIGNANT NEOPLASM OF LOWER-INNER QUADRANT OF RIGHT BREAST OF FEMALE, ESTROGEN RECEPTOR NEGATIVE: Primary | ICD-10-CM

## 2020-02-19 DIAGNOSIS — Z17.1 CARCINOMA OF AXILLARY TAIL OF RIGHT BREAST IN FEMALE, ESTROGEN RECEPTOR NEGATIVE: ICD-10-CM

## 2020-02-19 DIAGNOSIS — C50.611 CARCINOMA OF AXILLARY TAIL OF RIGHT BREAST IN FEMALE, ESTROGEN RECEPTOR NEGATIVE: Primary | ICD-10-CM

## 2020-02-19 DIAGNOSIS — C50.311 MALIGNANT NEOPLASM OF LOWER-INNER QUADRANT OF RIGHT BREAST OF FEMALE, ESTROGEN RECEPTOR NEGATIVE: Primary | ICD-10-CM

## 2020-02-19 DIAGNOSIS — Z17.1 CARCINOMA OF AXILLARY TAIL OF RIGHT BREAST IN FEMALE, ESTROGEN RECEPTOR NEGATIVE: Primary | ICD-10-CM

## 2020-02-19 DIAGNOSIS — T45.1X5A ANEMIA ASSOCIATED WITH CHEMOTHERAPY: ICD-10-CM

## 2020-02-19 PROCEDURE — 99999 PR PBB SHADOW E&M-EST. PATIENT-LVL III: ICD-10-PCS | Mod: PBBFAC,,, | Performed by: INTERNAL MEDICINE

## 2020-02-19 PROCEDURE — 63600175 PHARM REV CODE 636 W HCPCS: Performed by: INTERNAL MEDICINE

## 2020-02-19 PROCEDURE — 99213 OFFICE O/P EST LOW 20 MIN: CPT | Mod: PBBFAC,25 | Performed by: INTERNAL MEDICINE

## 2020-02-19 PROCEDURE — A4216 STERILE WATER/SALINE, 10 ML: HCPCS | Performed by: INTERNAL MEDICINE

## 2020-02-19 PROCEDURE — 25000003 PHARM REV CODE 250: Performed by: INTERNAL MEDICINE

## 2020-02-19 PROCEDURE — 96365 THER/PROPH/DIAG IV INF INIT: CPT

## 2020-02-19 PROCEDURE — 99215 OFFICE O/P EST HI 40 MIN: CPT | Mod: S$PBB,,, | Performed by: INTERNAL MEDICINE

## 2020-02-19 PROCEDURE — 99215 PR OFFICE/OUTPT VISIT, EST, LEVL V, 40-54 MIN: ICD-10-PCS | Mod: S$PBB,,, | Performed by: INTERNAL MEDICINE

## 2020-02-19 PROCEDURE — 99999 PR PBB SHADOW E&M-EST. PATIENT-LVL III: CPT | Mod: PBBFAC,,, | Performed by: INTERNAL MEDICINE

## 2020-02-19 PROCEDURE — 96366 THER/PROPH/DIAG IV INF ADDON: CPT

## 2020-02-19 PROCEDURE — 96367 TX/PROPH/DG ADDL SEQ IV INF: CPT

## 2020-02-19 RX ORDER — HEPARIN 100 UNIT/ML
500 SYRINGE INTRAVENOUS
Status: CANCELLED | OUTPATIENT
Start: 2020-02-20

## 2020-02-19 RX ORDER — HEPARIN 100 UNIT/ML
500 SYRINGE INTRAVENOUS
Status: DISCONTINUED | OUTPATIENT
Start: 2020-02-19 | End: 2020-02-19 | Stop reason: HOSPADM

## 2020-02-19 RX ORDER — HEPARIN 100 UNIT/ML
500 SYRINGE INTRAVENOUS
Status: CANCELLED | OUTPATIENT
Start: 2020-02-19

## 2020-02-19 RX ORDER — SODIUM CHLORIDE 0.9 % (FLUSH) 0.9 %
10 SYRINGE (ML) INJECTION
Status: CANCELLED | OUTPATIENT
Start: 2020-02-19

## 2020-02-19 RX ORDER — SODIUM CHLORIDE 0.9 % (FLUSH) 0.9 %
10 SYRINGE (ML) INJECTION
Status: DISCONTINUED | OUTPATIENT
Start: 2020-02-19 | End: 2020-02-19 | Stop reason: HOSPADM

## 2020-02-19 RX ORDER — FAMOTIDINE 10 MG/ML
20 INJECTION INTRAVENOUS
Status: CANCELLED | OUTPATIENT
Start: 2020-02-20

## 2020-02-19 RX ORDER — SODIUM CHLORIDE 0.9 % (FLUSH) 0.9 %
10 SYRINGE (ML) INJECTION
Status: CANCELLED | OUTPATIENT
Start: 2020-02-20

## 2020-02-19 RX ORDER — ONDANSETRON HCL IN 0.9 % NACL 8 MG/50 ML
8 INTRAVENOUS SOLUTION, PIGGYBACK (ML) INTRAVENOUS
Status: COMPLETED | OUTPATIENT
Start: 2020-02-19 | End: 2020-02-19

## 2020-02-19 RX ORDER — OXYCODONE HYDROCHLORIDE 5 MG/1
TABLET ORAL
Qty: 60 TABLET | Refills: 0 | Status: SHIPPED | OUTPATIENT
Start: 2020-02-19 | End: 2020-03-11

## 2020-02-19 RX ORDER — SODIUM CHLORIDE AND POTASSIUM CHLORIDE 150; 900 MG/100ML; MG/100ML
INJECTION, SOLUTION INTRAVENOUS ONCE
Status: COMPLETED | OUTPATIENT
Start: 2020-02-19 | End: 2020-02-19

## 2020-02-19 RX ORDER — EPINEPHRINE 0.3 MG/.3ML
0.3 INJECTION SUBCUTANEOUS ONCE AS NEEDED
Status: CANCELLED | OUTPATIENT
Start: 2020-02-20

## 2020-02-19 RX ORDER — DIPHENHYDRAMINE HYDROCHLORIDE 50 MG/ML
50 INJECTION INTRAMUSCULAR; INTRAVENOUS ONCE AS NEEDED
Status: CANCELLED | OUTPATIENT
Start: 2020-02-20

## 2020-02-19 RX ADMIN — HEPARIN 500 UNITS: 100 SYRINGE at 01:02

## 2020-02-19 RX ADMIN — Medication 8 MG: at 11:02

## 2020-02-19 RX ADMIN — Medication 10 ML: at 01:02

## 2020-02-19 RX ADMIN — POTASSIUM CHLORIDE AND SODIUM CHLORIDE 1000 ML: 900; 150 INJECTION, SOLUTION INTRAVENOUS at 11:02

## 2020-02-19 RX ADMIN — SODIUM CHLORIDE: 9 INJECTION, SOLUTION INTRAVENOUS at 11:02

## 2020-02-19 NOTE — Clinical Note
Naima, she should receive 1 L of hydration plus Zofran today.  If she improves she may proceed with chemotherapy tomorrow I have signed off on her chemo.  We will see her back in 7-8 weeks

## 2020-02-19 NOTE — NURSING
1100  Pt here for 1L NS w/ 20mEq K+, accompanied by spouse, reports nausea and weakness since last p.m., also chronic pain; discussed treatment plan for today, all questions answered and pt agrees to proceed

## 2020-02-19 NOTE — PLAN OF CARE
1405  Infusion completed, pt tolerated well; pt instructed to remain well hydrated; discussed when to contact MD, when to report to ER; pt declined AVS, verbalized understanding of all discussed and when to report next

## 2020-02-19 NOTE — PROGRESS NOTES
Subjective:       Patient ID: Briseyda Rojas is a 64 y.o. female.    Chief Complaint: No chief complaint on file.       Briseyda Rojas returns today for follow up.  She is due for her fourth ycle of neoadjuvant Taxol tomorrow.  She is being treated on a Q 2 W schedule.    Apparently since yesterday she feels nauseated, has had poor appetite, and has been unable to eat or drink anything.    Briefly, she  is a 64 y.o. female whom I saw initially on October 22, 2019 for a recently diagnosed triple negative right breast cancer.  She underwent mammography and ultrasound in early September of this year which showed architectural distortion in the right upper lateral breast and pleomorphic calcifications in the right upper mid breast.  Of note, this was her first mammogram ever.  Ultrasound of the axilla was also performed which showed a prominent lymph node.  She underwent biopsy of all 3 lesions. The right upper lateral lesion returned as DCIS with comedo necrosis.  The right upper mid breast came back as normal.  The right axillary lymph node returned as infiltrating ductal carcinoma, ER negative, TX negative, HER2 equivocal but negative by FISH.  She was been seen by Dr. Raya and she was referred for consideration of neoadjuvant chemotherapy.  She has so far completed 4 cycles of neoadjuvant Adriamycin and cyclophosphamide and started Taxol  6  weeks ago.  She is due for cycle 4 tomorrow.    Her CBC today shows a white count of 18,400 per cubic mm, hemoglobin 10.5 grams/deciliter, hematocrit 35.2% and platelets 254,000 per cubic mm.  LFTs are normal and her bilirubin is 0.3 mg/dL.  K is 3.4 mEq/L.    Review of Systems    Overall she feels fair and appears to be in distress.  She complains of malaise, weakness, poor appetite and nausea.  She states that she has been unable to eat or drink since yesterday.  She also complains of diffuse myalgias since her last round of Taxol.  She is asking for a refill on her analgesics.   She appears anxious but she denies any depression, easy bruising, chills, night  sweats,  fever, constipation, diplopia,  blurred vision, headache, chest pain, palpitations, shortness of breath, left breast pain, abdominal pain, extremity pain, or difficulty ambulating.  The remainder of the ten-point ROS, including general, skin, lymph, H/N, cardiorespiratory, GI, , Neuro, Endocrine, and psychiatric is negative.     Objective:      Physical Exam      She is alert, oriented to time, place, person, appears anxious and in moderate physical distress. She is here with her .                                VITAL SIGNS:  Reviewed                                      HEENT:  Normal.  There are no nasal, oral, lip, gingival, auricular, lid,    or conjunctival lesions.  Mucosae are moist and pink, and there is no        thrush.  Pupils are equal, reactive to light and accommodation.              Extraocular muscle movements are intact.  Dentition is poor with several decayed teeth.  There is no frontal or maxillary tenderness.                                     NECK:  Supple without JVD, adenopathy, or thyromegaly.                       LUNGS:  Clear to auscultation without wheezing, rales, or rhonchi.           CARDIOVASCULAR:  Reveals an S1, S2, no murmurs, no rubs, no gallops.         ABDOMEN:  Soft, nontender, without organomegaly.  Bowel sounds are    present.                                                                     EXTREMITIES:  No cyanosis, clubbing, or edema.                               BREASTS:  the right breast has no discrete masses.  There is an right axillary mass, approximately 1 cm, which is unchanged from her last visit.       A left sided portacath is again identified.      There are no masses in the left breast.                             LYMPHATIC:  There is no cervical, left axillary, or supraclavicular adenopathy.   SKIN:  Warm and moist, without petechiae, rashes, induration, or  ecchymoses.           NEUROLOGIC:  DTRs are 0-1+ bilaterally, symmetrical, motor function is 5/5,  and cranial nerves are  within normal limits.    Assessment:       1. Carcinoma of axillary tail of right breast in female, estrogen receptor negative    2. Anemia associated with chemotherapy     3.    Biopsy proven axillary metastases.  4.    Nausea, vomiting  Plan:       In regards to her current symptoms, the patient will be hydrated with one lier of normal saline with 20 mEq of potassium chloride.  She will also be given ondansetron 8 mg IV and I will re-evaluate her in a few hours.    Assuming and she improves, she may receive her chemotherapy tomorrow.  I have alerted Dr. Raya that this is her last round of chemotherapy.    If she does not improve, she will be admitted for symptom management.  Assuming that she will have surgery in approximately 4 weeks, I will see her again in 7 weeks for follow-up.  Finally, I have sent a prescription for oxycodone to her local pharmacy.  Her multiple questions were answered to her satisfaction.

## 2020-03-05 ENCOUNTER — HOSPITAL ENCOUNTER (OUTPATIENT)
Dept: RADIOLOGY | Facility: HOSPITAL | Age: 65
Discharge: HOME | End: 2020-03-05
Attending: SURGERY
Payer: MEDICAID

## 2020-03-05 ENCOUNTER — OFFICE VISIT (OUTPATIENT)
Dept: SURGERY | Facility: CLINIC | Age: 65
End: 2020-03-05
Payer: MEDICAID

## 2020-03-05 VITALS
DIASTOLIC BLOOD PRESSURE: 84 MMHG | BODY MASS INDEX: 15.96 KG/M2 | HEART RATE: 73 BPM | WEIGHT: 98.88 LBS | SYSTOLIC BLOOD PRESSURE: 157 MMHG | TEMPERATURE: 98 F

## 2020-03-05 DIAGNOSIS — Z17.1 MALIGNANT NEOPLASM OF RIGHT BREAST IN FEMALE, ESTROGEN RECEPTOR NEGATIVE, UNSPECIFIED SITE OF BREAST: ICD-10-CM

## 2020-03-05 DIAGNOSIS — C50.911 MALIGNANT NEOPLASM OF RIGHT BREAST IN FEMALE, ESTROGEN RECEPTOR NEGATIVE, UNSPECIFIED SITE OF BREAST: Primary | ICD-10-CM

## 2020-03-05 DIAGNOSIS — C50.911 MALIGNANT NEOPLASM OF RIGHT BREAST IN FEMALE, ESTROGEN RECEPTOR NEGATIVE, UNSPECIFIED SITE OF BREAST: ICD-10-CM

## 2020-03-05 DIAGNOSIS — Z17.1 MALIGNANT NEOPLASM OF RIGHT BREAST IN FEMALE, ESTROGEN RECEPTOR NEGATIVE, UNSPECIFIED SITE OF BREAST: Primary | ICD-10-CM

## 2020-03-05 DIAGNOSIS — Z17.1 CARCINOMA OF AXILLARY TAIL OF RIGHT BREAST IN FEMALE, ESTROGEN RECEPTOR NEGATIVE: ICD-10-CM

## 2020-03-05 DIAGNOSIS — C50.611 CARCINOMA OF AXILLARY TAIL OF RIGHT BREAST IN FEMALE, ESTROGEN RECEPTOR NEGATIVE: ICD-10-CM

## 2020-03-05 PROCEDURE — 77049 MRI BREAST C-+ W/CAD BI: CPT | Mod: TC

## 2020-03-05 PROCEDURE — 99214 OFFICE O/P EST MOD 30 MIN: CPT | Mod: PBBFAC | Performed by: SURGERY

## 2020-03-05 PROCEDURE — A9577 INJ MULTIHANCE: HCPCS | Performed by: SURGERY

## 2020-03-05 PROCEDURE — 99999 PR PBB SHADOW E&M-EST. PATIENT-LVL IV: ICD-10-PCS | Mod: PBBFAC,,, | Performed by: SURGERY

## 2020-03-05 PROCEDURE — 99999 PR PBB SHADOW E&M-EST. PATIENT-LVL IV: CPT | Mod: PBBFAC,,, | Performed by: SURGERY

## 2020-03-05 PROCEDURE — 25500020 PHARM REV CODE 255: Performed by: SURGERY

## 2020-03-05 PROCEDURE — 99215 PR OFFICE/OUTPT VISIT, EST, LEVL V, 40-54 MIN: ICD-10-PCS | Mod: S$PBB,,, | Performed by: SURGERY

## 2020-03-05 PROCEDURE — 77049 MRI BREAST C-+ W/CAD BI: CPT | Mod: 26,,, | Performed by: RADIOLOGY

## 2020-03-05 PROCEDURE — 99215 OFFICE O/P EST HI 40 MIN: CPT | Mod: S$PBB,,, | Performed by: SURGERY

## 2020-03-05 PROCEDURE — 77049 MRI BREAST W/WO CONTRAST, W/CAD, BILATERAL: ICD-10-PCS | Mod: 26,,, | Performed by: RADIOLOGY

## 2020-03-05 RX ADMIN — GADOBENATE DIMEGLUMINE 10 ML: 529 INJECTION, SOLUTION INTRAVENOUS at 02:03

## 2020-03-05 NOTE — PROGRESS NOTES
Went into the exam room to give pre-op instructions.  Ms. Rojas was lying on the exam table.  She asked if Dr. Raya had called about her pain medication.  Dr. Raya said that she would call Dr. De Leon to discuss her pain.  Asking if she is finished and can leave.  Showed her a KASI drain and explained that she would have a drain post-op and be asked to empty and record the drainage.  Appointment made for [facility] Pre[facility]Admit.  [facility] Surgery Guidebook and KASI drain instruction sheet given.  Stated that her brother  and she has to go buy a dress.  Stated that she just wants to go home, eat gumbo, and lie down.  Her son called from the waiting area and said that they need to leave.

## 2020-03-06 ENCOUNTER — TELEPHONE (OUTPATIENT)
Dept: HEMATOLOGY/ONCOLOGY | Facility: CLINIC | Age: 65
End: 2020-03-06

## 2020-03-06 ENCOUNTER — OFFICE VISIT (OUTPATIENT)
Dept: HEMATOLOGY/ONCOLOGY | Facility: CLINIC | Age: 65
End: 2020-03-06
Payer: MEDICAID

## 2020-03-06 ENCOUNTER — LAB VISIT (OUTPATIENT)
Dept: LAB | Facility: HOSPITAL | Age: 65
End: 2020-03-06
Payer: MEDICAID

## 2020-03-06 DIAGNOSIS — C50.611 CARCINOMA OF AXILLARY TAIL OF RIGHT BREAST IN FEMALE, ESTROGEN RECEPTOR NEGATIVE: ICD-10-CM

## 2020-03-06 DIAGNOSIS — R63.4 WEIGHT LOSS: ICD-10-CM

## 2020-03-06 DIAGNOSIS — T45.1X5A ANEMIA ASSOCIATED WITH CHEMOTHERAPY: ICD-10-CM

## 2020-03-06 DIAGNOSIS — D64.81 ANEMIA ASSOCIATED WITH CHEMOTHERAPY: ICD-10-CM

## 2020-03-06 DIAGNOSIS — C50.611 CARCINOMA OF AXILLARY TAIL OF RIGHT BREAST IN FEMALE, ESTROGEN RECEPTOR NEGATIVE: Primary | ICD-10-CM

## 2020-03-06 DIAGNOSIS — R52 PAIN: ICD-10-CM

## 2020-03-06 DIAGNOSIS — Z17.1 CARCINOMA OF AXILLARY TAIL OF RIGHT BREAST IN FEMALE, ESTROGEN RECEPTOR NEGATIVE: Primary | ICD-10-CM

## 2020-03-06 DIAGNOSIS — Z17.1 CARCINOMA OF AXILLARY TAIL OF RIGHT BREAST IN FEMALE, ESTROGEN RECEPTOR NEGATIVE: ICD-10-CM

## 2020-03-06 DIAGNOSIS — R63.0 DECREASED APPETITE: Primary | ICD-10-CM

## 2020-03-06 DIAGNOSIS — G62.9 NEUROPATHY: ICD-10-CM

## 2020-03-06 LAB
ALBUMIN SERPL BCP-MCNC: 3.1 G/DL (ref 3.5–5.2)
ALP SERPL-CCNC: 114 U/L (ref 55–135)
ALT SERPL W/O P-5'-P-CCNC: 6 U/L (ref 10–44)
ANION GAP SERPL CALC-SCNC: 10 MMOL/L (ref 8–16)
AST SERPL-CCNC: 13 U/L (ref 10–40)
BILIRUB SERPL-MCNC: 0.3 MG/DL (ref 0.1–1)
BUN SERPL-MCNC: 12 MG/DL (ref 8–23)
CALCIUM SERPL-MCNC: 9 MG/DL (ref 8.7–10.5)
CHLORIDE SERPL-SCNC: 103 MMOL/L (ref 95–110)
CO2 SERPL-SCNC: 29 MMOL/L (ref 23–29)
CREAT SERPL-MCNC: 0.8 MG/DL (ref 0.5–1.4)
ERYTHROCYTE [DISTWIDTH] IN BLOOD BY AUTOMATED COUNT: 16.9 % (ref 11.5–14.5)
EST. GFR  (AFRICAN AMERICAN): >60 ML/MIN/1.73 M^2
EST. GFR  (NON AFRICAN AMERICAN): >60 ML/MIN/1.73 M^2
GLUCOSE SERPL-MCNC: 100 MG/DL (ref 70–110)
HCT VFR BLD AUTO: 30.7 % (ref 37–48.5)
HGB BLD-MCNC: 9.3 G/DL (ref 12–16)
IMM GRANULOCYTES # BLD AUTO: 0.12 K/UL (ref 0–0.04)
MCH RBC QN AUTO: 29.8 PG (ref 27–31)
MCHC RBC AUTO-ENTMCNC: 30.3 G/DL (ref 32–36)
MCV RBC AUTO: 98 FL (ref 82–98)
NEUTROPHILS # BLD AUTO: 8.8 K/UL (ref 1.8–7.7)
PLATELET # BLD AUTO: 280 K/UL (ref 150–350)
PMV BLD AUTO: 10.7 FL (ref 9.2–12.9)
POTASSIUM SERPL-SCNC: 3.4 MMOL/L (ref 3.5–5.1)
PROT SERPL-MCNC: 6.6 G/DL (ref 6–8.4)
RBC # BLD AUTO: 3.12 M/UL (ref 4–5.4)
SODIUM SERPL-SCNC: 142 MMOL/L (ref 136–145)
WBC # BLD AUTO: 11.49 K/UL (ref 3.9–12.7)

## 2020-03-06 PROCEDURE — 99999 PR PBB SHADOW E&M-EST. PATIENT-LVL II: CPT | Mod: PBBFAC,,, | Performed by: INTERNAL MEDICINE

## 2020-03-06 PROCEDURE — 99999 PR PBB SHADOW E&M-EST. PATIENT-LVL II: ICD-10-PCS | Mod: PBBFAC,,, | Performed by: INTERNAL MEDICINE

## 2020-03-06 PROCEDURE — 99212 OFFICE O/P EST SF 10 MIN: CPT | Mod: PBBFAC | Performed by: INTERNAL MEDICINE

## 2020-03-06 PROCEDURE — 99215 OFFICE O/P EST HI 40 MIN: CPT | Mod: S$PBB,,, | Performed by: INTERNAL MEDICINE

## 2020-03-06 PROCEDURE — 80053 COMPREHEN METABOLIC PANEL: CPT

## 2020-03-06 PROCEDURE — 85027 COMPLETE CBC AUTOMATED: CPT

## 2020-03-06 PROCEDURE — 99215 PR OFFICE/OUTPT VISIT, EST, LEVL V, 40-54 MIN: ICD-10-PCS | Mod: S$PBB,,, | Performed by: INTERNAL MEDICINE

## 2020-03-06 PROCEDURE — 36415 COLL VENOUS BLD VENIPUNCTURE: CPT

## 2020-03-06 RX ORDER — GABAPENTIN 300 MG/1
300 CAPSULE ORAL 3 TIMES DAILY
Qty: 90 CAPSULE | Refills: 2 | Status: SHIPPED | OUTPATIENT
Start: 2020-03-06 | End: 2022-01-18

## 2020-03-06 RX ORDER — OXYCODONE HYDROCHLORIDE 5 MG/1
5 TABLET ORAL EVERY 6 HOURS PRN
Qty: 20 TABLET | Refills: 0 | Status: SHIPPED | OUTPATIENT
Start: 2020-03-06 | End: 2020-03-11

## 2020-03-06 RX ORDER — MEGESTROL ACETATE 40 MG/ML
400 SUSPENSION ORAL DAILY
Qty: 240 ML | Refills: 2 | Status: SHIPPED | OUTPATIENT
Start: 2020-03-06 | End: 2021-03-06

## 2020-03-06 RX ORDER — MEGESTROL ACETATE 40 MG/ML
400 SUSPENSION ORAL DAILY
Qty: 240 ML | Refills: 2 | Status: SHIPPED | OUTPATIENT
Start: 2020-03-06 | End: 2020-03-09 | Stop reason: SDUPTHER

## 2020-03-06 NOTE — TELEPHONE ENCOUNTER
Attempted several times to reach patient at both spouse's number and patient mobile number.   Voicemail left on primary contact number.   On mobile phone, initially sounded as if phone answered and then hung up. On return calls, one ring and straight to voicemail    Will re-attempt at later time to reach patient back to discuss current status and appointment for this afternoon.

## 2020-03-06 NOTE — PROGRESS NOTES
"Subjective:       Patient ID: Briseyda Rojas is a 64 y.o. female.    Chief Complaint: No chief complaint on file.        Briseyda Rojas presents today at my request to be seen.  I had received a call yesterday from the [facility] where she was seen by Dr. Raya that she looked ill and emaciated, and had a lot of pain issues, hence my request for her to present hre today.    Briefly, she  is a 64 y.o. female whom I saw initially on October 22, 2019 for a recently diagnosed triple negative right breast cancer.  She underwent mammography and ultrasound in early September of this year which showed architectural distortion in the right upper lateral breast and pleomorphic calcifications in the right upper mid breast.  Of note, this was her first mammogram ever.  Ultrasound of the axilla was also performed which showed a prominent lymph node.  She underwent biopsy of all 3 lesions. The right upper lateral lesion returned as DCIS with comedo necrosis.  The right upper mid breast came back as normal.  The right axillary lymph node returned as infiltrating ductal carcinoma, ER negative, MN negative, HER2 equivocal but negative by FISH.  She was been seen by Dr. Raya and she was referred for consideration of neoadjuvant chemotherapy.  She has so far completed 4 cycles of neoadjuvant Adriamycin and cyclophosphamide and 4 cycles of taxol.    Her labs today are significant for slightly low albumin at 3.1 grams/deciliter and a slightly low potassium at 3.3 mEq per L. her CBC shows a white count of 11,500 per cubic mm, hemoglobin 9.3 grams/deciliter, hematocrit 30.7%, MCV 98, and platelets 924162 per cubic mm.      Review of Systems    Overall she feels fair, however, she states that compared to yesterday she feels "much better".  When asked, she states that she has lost 25 lbs with the chemotherapy and her appetite remains poor.  She also complains of low back pain x 2 weeks, and she is asking for analgesics.  She also states " that she is experiencing numbness and pain of her fingers and toes.   In addition, she mentions floaters in both eyes.   She appears anxious but she denies any depression, easy bruising, chills, night  sweats,  fever, constipation, diplopia,  blurred vision, headache, chest pain, palpitations, shortness of breath, left breast pain, abdominal pain, extremity pain, or difficulty ambulating.  The remainder of the ten-point ROS, including general, skin, lymph, H/N, cardiorespiratory, GI, , Neuro, Endocrine, and psychiatric is negative.     Objective:      Physical Exam      She is alert, oriented to time, place, person, appears anxious but she is in no acute distress. She is here with her .                                VITAL SIGNS:  Reviewed                                      HEENT:  Normal.  There are no nasal, oral, lip, gingival, auricular, or lid lesions.  She does have pterygia on both eyes.  Mucosae are moist and pink, and there is no        thrush.  Pupils are equal, reactive to light and accommodation.              Extraocular muscle movements are intact.  Dentition is poor with several decayed teeth.  There is no frontal or maxillary tenderness.                                     NECK:  Supple without JVD, adenopathy, or thyromegaly.                       LUNGS:  Clear to auscultation without wheezing, rales, or rhonchi.           CARDIOVASCULAR:  Reveals an S1, S2, no murmurs, no rubs, no gallops.         ABDOMEN:  Soft, nontender, without organomegaly.  Bowel sounds are    present.                                                                     EXTREMITIES:  No cyanosis, clubbing, or edema.                               BREASTS:  the right breast has no discrete masses.  There is a small right axillary node palpated.       A left sided portacath is again identified.      There are no masses in the left breast.                             LYMPHATIC:  There is no cervical, left axillary, or  supraclavicular adenopathy.   SKIN:  Warm and moist, without petechiae, rashes, induration, or ecchymoses.           NEUROLOGIC:  DTRs are 0-1+ bilaterally, symmetrical, motor function is 5/5,  and cranial nerves are  within normal limits.    Assessment:       1. Triple negative breast cancer, s/p neoadjuvant chemotherapy with a very good clinical response     2. 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    Biopsy proven axillary metastases.  5.      Decreased appetite, failure to thrive  6.      Chemotherapy-induced anemia    Plan:       In regards to her cancer she has now completed her neoadjuvant chemotherapy and she will hopefully proceed with surgery later this month..  In regards to her low back pain, out of abundance of caution we will proceed with a bone scan.  In regards to her poor appetite she will be started on Megace.  In regards to her pterygia and her floaters, we will initiate a referral to Ophthalmology  Finally, she will also be started on gabapentin for her neuropathy.  I will see her again after her bone scan to discuss the results.    Her multiple questions and her 's questions were answered to her satisfaction.

## 2020-03-06 NOTE — TELEPHONE ENCOUNTER
Successfully got in touch with Mr. Harkins, patient spouse to discuss arranging visit and labs with Dr. De Leon regarding need for examination, possibly hydration and pain control.  Patient has been really weak and on report Laura yesterday, RN reports that patient looked really unwell.    Confirmed appointment for this afternoon with labs at 1 and to see Dr. De Leon at 140 pm.  He confirmed and an his way at this time.

## 2020-03-06 NOTE — TELEPHONE ENCOUNTER
----- Message from Kaycee Lunsford sent at 3/6/2020 10:24 AM CST -----  Contact: patient   [phone] please call above patient  at number in message returning call to the nurse concerning medication waiting on a call back thanks.

## 2020-03-06 NOTE — TELEPHONE ENCOUNTER
----- Message from Varun Armas MD sent at 3/6/2020  2:15 PM CST -----  See me next week after the bone scan

## 2020-03-06 NOTE — TELEPHONE ENCOUNTER
Spoke with Mr. Rojas. Provided dates and times for bone scan at [facility] and office visit with Dr. De Leon.

## 2020-03-10 ENCOUNTER — TELEPHONE (OUTPATIENT)
Dept: SURGERY | Facility: CLINIC | Age: 65
End: 2020-03-10

## 2020-03-10 DIAGNOSIS — C50.911 MALIGNANT NEOPLASM OF RIGHT BREAST IN FEMALE, ESTROGEN RECEPTOR NEGATIVE, UNSPECIFIED SITE OF BREAST: Primary | ICD-10-CM

## 2020-03-10 DIAGNOSIS — Z17.1 MALIGNANT NEOPLASM OF RIGHT BREAST IN FEMALE, ESTROGEN RECEPTOR NEGATIVE, UNSPECIFIED SITE OF BREAST: Primary | ICD-10-CM

## 2020-03-10 NOTE — TELEPHONE ENCOUNTER
Called Patient and left a phone message asking her to call [phone] to schedule a CT of her chest, abdomen, and pelvis before her surgery with Dr. Raya on 4-3-20 at [facility].  Asked if she wanted to try to schedule on a day that she already had appointments or on a free day.

## 2020-03-11 ENCOUNTER — OFFICE VISIT (OUTPATIENT)
Dept: HEMATOLOGY/ONCOLOGY | Facility: CLINIC | Age: 65
End: 2020-03-11
Payer: MEDICAID

## 2020-03-11 VITALS
SYSTOLIC BLOOD PRESSURE: 163 MMHG | OXYGEN SATURATION: 99 % | DIASTOLIC BLOOD PRESSURE: 85 MMHG | RESPIRATION RATE: 18 BRPM | HEIGHT: 66 IN | WEIGHT: 107.13 LBS | BODY MASS INDEX: 17.22 KG/M2 | HEART RATE: 94 BPM | TEMPERATURE: 99 F

## 2020-03-11 DIAGNOSIS — T45.1X5A ANEMIA ASSOCIATED WITH CHEMOTHERAPY: ICD-10-CM

## 2020-03-11 DIAGNOSIS — Z17.1 CARCINOMA OF AXILLARY TAIL OF RIGHT BREAST IN FEMALE, ESTROGEN RECEPTOR NEGATIVE: Primary | ICD-10-CM

## 2020-03-11 DIAGNOSIS — C50.611 CARCINOMA OF AXILLARY TAIL OF RIGHT BREAST IN FEMALE, ESTROGEN RECEPTOR NEGATIVE: Primary | ICD-10-CM

## 2020-03-11 DIAGNOSIS — D64.81 ANEMIA ASSOCIATED WITH CHEMOTHERAPY: ICD-10-CM

## 2020-03-11 PROCEDURE — 99999 PR PBB SHADOW E&M-EST. PATIENT-LVL III: ICD-10-PCS | Mod: PBBFAC,,, | Performed by: INTERNAL MEDICINE

## 2020-03-11 PROCEDURE — 99213 OFFICE O/P EST LOW 20 MIN: CPT | Mod: PBBFAC | Performed by: INTERNAL MEDICINE

## 2020-03-11 PROCEDURE — 99214 PR OFFICE/OUTPT VISIT, EST, LEVL IV, 30-39 MIN: ICD-10-PCS | Mod: S$PBB,,, | Performed by: INTERNAL MEDICINE

## 2020-03-11 PROCEDURE — 99999 PR PBB SHADOW E&M-EST. PATIENT-LVL III: CPT | Mod: PBBFAC,,, | Performed by: INTERNAL MEDICINE

## 2020-03-11 PROCEDURE — 99214 OFFICE O/P EST MOD 30 MIN: CPT | Mod: S$PBB,,, | Performed by: INTERNAL MEDICINE

## 2020-03-11 RX ORDER — OXYCODONE HYDROCHLORIDE 5 MG/1
TABLET ORAL
Qty: 20 TABLET | Refills: 0 | Status: SHIPPED | OUTPATIENT
Start: 2020-03-11 | End: 2020-04-27

## 2020-03-11 NOTE — PROGRESS NOTES
Subjective:       Patient ID: Briseyda Rojas is a 64 y.o. female.    Chief Complaint: No chief complaint on file.        Briseyda Rojas presents today for follow-up.  Her bone scan earlier this week shows no evidence of bone metastases.  She is also scheduled for CT scan of the chest, abdomen, and pelvis prior to proceeding with surgery.    Briefly, she  is a 64 y.o. female whom I saw initially on October 22, 2019 for a recently diagnosed triple negative right breast cancer.  She underwent mammography and ultrasound in early September of this year which showed architectural distortion in the right upper lateral breast and pleomorphic calcifications in the right upper mid breast.  Of note, this was her first mammogram ever.  Ultrasound of the axilla was also performed which showed a prominent lymph node.  She underwent biopsy of all 3 lesions. The right upper lateral lesion returned as DCIS with comedo necrosis.  The right upper mid breast came back as normal.  The right axillary lymph node returned as infiltrating ductal carcinoma, ER negative, NE negative, HER2 equivocal but negative by FISH.  She was been seen by Dr. Raya and she was referred for consideration of neoadjuvant chemotherapy.  She has so far completed 4 cycles of neoadjuvant Adriamycin and cyclophosphamide and 4 cycles of taxol.    Her labs on March 6, 2020 were significant for slightly low albumin at 3.1 grams/deciliter and a slightly low potassium at 3.3 mEq per L.  Her CBC had shown a white count of 11,500 per cubic mm, hemoglobin 9.3 grams/deciliter, hematocrit 30.7%, MCV 98, and platelets 700728 per cubic mm.      Review of Systems    Overall she feels fair, and she again complains of low back pain.  She is again asking for analgesics.  She also states that she is experiencing numbness and pain of her fingers and toes.   She appears anxious but she denies any depression, easy bruising, chills, night  sweats,  fever, constipation, diplopia,  blurred  vision, headache, chest pain, palpitations, shortness of breath, left breast pain, abdominal pain, extremity pain, or difficulty ambulating.  The remainder of the ten-point ROS, including general, skin, lymph, H/N, cardiorespiratory, GI, , Neuro, Endocrine, and psychiatric is negative.     Objective:      Physical Exam      She is alert, oriented to time, place, person, appears anxious but she is in no acute distress. She is here with her .                                VITAL SIGNS:  Reviewed                                      HEENT:  Normal.  There are no nasal, oral, lip, gingival, auricular, or lid lesions.  She does have pterygia on both eyes.  Mucosae are moist and pink, and there is no        thrush.  Pupils are equal, reactive to light and accommodation.              Extraocular muscle movements are intact.  Dentition is poor with several decayed teeth.  There is no frontal or maxillary tenderness.                                     NECK:  Supple without JVD, adenopathy, or thyromegaly.                       LUNGS:  Clear to auscultation without wheezing, rales, or rhonchi.           CARDIOVASCULAR:  Reveals an S1, S2, no murmurs, no rubs, no gallops.         ABDOMEN:  Soft, nontender, without organomegaly.  Bowel sounds are    present.                                                                     EXTREMITIES:  No cyanosis, clubbing, or edema.                               BREASTS:  the right breast has no discrete masses.  There is a small right axillary node palpated.       A left sided portacath is again identified.      There are no masses in the left breast.                             LYMPHATIC:  There is no cervical, left axillary, or supraclavicular adenopathy.   SKIN:  Warm and moist, without petechiae, rashes, induration, or ecchymoses.           NEUROLOGIC:  DTRs are 0-1+ bilaterally, symmetrical, motor function is 5/5,  and cranial nerves are  within normal  limits.    Assessment:       1. Triple negative breast cancer, s/p neoadjuvant chemotherapy with a very good clinical response     2. New onset low back pain. 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    Biopsy proven axillary metastases.  5.      Decreased appetite, failure to thrive  6.      Chemotherapy-induced anemia    Plan:       In regards to her cancer she has now completed her neoadjuvant chemotherapy and she will hopefully proceed with surgery, which has been tentatively scheduled 3 weeks from today  In regards to her low back pain, I told her that I will send another prescription for opioid analgesics to our pharmacy but I expect that all future analgesic  prescriptions will be by her primary care physician since her bone scan is negative.  She voiced understanding.  In regards to her poor appetite she will remain on Megace.    RTC 5 weeks.    Her multiple questions and her 's questions were answered to her satisfaction.

## 2020-03-12 ENCOUNTER — TELEPHONE (OUTPATIENT)
Dept: SURGERY | Facility: CLINIC | Age: 65
End: 2020-03-12

## 2020-03-12 NOTE — TELEPHONE ENCOUNTER
Called and spoke to the Patient's .  Reminded him about the Breast Reflector appointment on 3-18-20 at the [facility].  Stated that he was aware of this appointment.  Explained that Dr. Raya wanted to get a CT scan before surgery.  Appointment added to her 3-19-20 appointments at [facility].  Appointment slips mailed.   did not have any further questions at present.

## 2020-03-13 ENCOUNTER — TELEPHONE (OUTPATIENT)
Dept: PHARMACY | Facility: CLINIC | Age: 65
End: 2020-03-13

## 2020-03-18 ENCOUNTER — HOSPITAL ENCOUNTER (OUTPATIENT)
Dept: RADIOLOGY | Facility: HOSPITAL | Age: 65
Discharge: HOME | End: 2020-03-18
Attending: SURGERY
Payer: MEDICAID

## 2020-03-18 ENCOUNTER — TELEPHONE (OUTPATIENT)
Dept: HEMATOLOGY/ONCOLOGY | Facility: CLINIC | Age: 65
End: 2020-03-18

## 2020-03-18 DIAGNOSIS — Z17.1 MALIGNANT NEOPLASM OF RIGHT BREAST IN FEMALE, ESTROGEN RECEPTOR NEGATIVE, UNSPECIFIED SITE OF BREAST: ICD-10-CM

## 2020-03-18 DIAGNOSIS — C50.611 CARCINOMA OF AXILLARY TAIL OF RIGHT BREAST IN FEMALE, ESTROGEN RECEPTOR NEGATIVE: ICD-10-CM

## 2020-03-18 DIAGNOSIS — C50.911 MALIGNANT NEOPLASM OF RIGHT BREAST IN FEMALE, ESTROGEN RECEPTOR NEGATIVE, UNSPECIFIED SITE OF BREAST: ICD-10-CM

## 2020-03-18 DIAGNOSIS — Z17.1 CARCINOMA OF AXILLARY TAIL OF RIGHT BREAST IN FEMALE, ESTROGEN RECEPTOR NEGATIVE: ICD-10-CM

## 2020-03-18 PROCEDURE — 19285 PERQ DEV BREAST 1ST US IMAG: CPT | Mod: RT,,, | Performed by: RADIOLOGY

## 2020-03-18 PROCEDURE — A4648 IMPLANTABLE TISSUE MARKER: HCPCS | Mod: PO

## 2020-03-18 PROCEDURE — 19285 US BREAST RADAR REFLECTOR LOCALIZATION W/GUIDANCE, 1ST LESION, RIGHT: ICD-10-PCS | Mod: RT,,, | Performed by: RADIOLOGY

## 2020-03-18 PROCEDURE — 19285 PERQ DEV BREAST 1ST US IMAG: CPT | Mod: PO,RT

## 2020-03-18 PROCEDURE — 77065 MAMMO DIGITAL DIAGNOSTIC RIGHT WITH CAD: ICD-10-PCS | Mod: 26,RT,, | Performed by: RADIOLOGY

## 2020-03-18 PROCEDURE — 25000003 PHARM REV CODE 250: Mod: PO | Performed by: SURGERY

## 2020-03-18 PROCEDURE — 77065 DX MAMMO INCL CAD UNI: CPT | Mod: 26,RT,, | Performed by: RADIOLOGY

## 2020-03-18 RX ORDER — LIDOCAINE HYDROCHLORIDE 20 MG/ML
10 INJECTION, SOLUTION INFILTRATION; PERINEURAL ONCE
Status: COMPLETED | OUTPATIENT
Start: 2020-03-18 | End: 2020-03-18

## 2020-03-18 RX ADMIN — LIDOCAINE HYDROCHLORIDE 5 ML: 20 INJECTION, SOLUTION INFILTRATION; PERINEURAL at 10:03

## 2020-03-18 NOTE — TELEPHONE ENCOUNTER
----- Message from Alexandrea Sosa sent at 3/18/2020  2:02 PM CDT -----  Contact: David / Robert / tel:   [phone]   Received a call from Delores.    Caller hung up and they did not get the info why she is calling, may be someone from a pre-op service, but not sure.   Pls call .

## 2020-03-18 NOTE — TELEPHONE ENCOUNTER
Returned call to patient caregiver, Landydonnie.  She was hoping to seek clarifications on some misunderstandings regarding billing/and pre-certifications for recently performed and upcoming studies.   Reviewed patient's referral status.   Explained that at this time medicaid mehnaz is showing pending, possibly system is needing updated information at this time.   However, unclear and would get additional information from our auth team.   Requested she call patient caregiver personally to clarify and better explain.   Patient caregiver also provided with numbers to our billing and 's office just in case.  She expressed gratitude.

## 2020-03-19 ENCOUNTER — DOCUMENTATION ONLY (OUTPATIENT)
Dept: SURGERY | Facility: CLINIC | Age: 65
End: 2020-03-19

## 2020-03-19 ENCOUNTER — HOSPITAL ENCOUNTER (OUTPATIENT)
Dept: RADIOLOGY | Facility: OTHER | Age: 65
Discharge: HOME | End: 2020-03-19
Attending: SURGERY
Payer: MEDICAID

## 2020-03-19 ENCOUNTER — HOSPITAL ENCOUNTER (OUTPATIENT)
Dept: CARDIOLOGY | Facility: OTHER | Age: 65
Discharge: HOME | End: 2020-03-19
Attending: SURGERY
Payer: MEDICAID

## 2020-03-19 DIAGNOSIS — C50.911 MALIGNANT NEOPLASM OF RIGHT BREAST IN FEMALE, ESTROGEN RECEPTOR NEGATIVE, UNSPECIFIED SITE OF BREAST: ICD-10-CM

## 2020-03-19 DIAGNOSIS — Z17.1 MALIGNANT NEOPLASM OF RIGHT BREAST IN FEMALE, ESTROGEN RECEPTOR NEGATIVE, UNSPECIFIED SITE OF BREAST: ICD-10-CM

## 2020-03-19 PROCEDURE — 74177 CT CHEST ABDOMEN PELVIS WITH CONTRAST (XPD): ICD-10-PCS | Mod: 26,,, | Performed by: RADIOLOGY

## 2020-03-19 PROCEDURE — 25500020 PHARM REV CODE 255: Performed by: SURGERY

## 2020-03-19 PROCEDURE — 93010 ELECTROCARDIOGRAM REPORT: CPT | Mod: ,,, | Performed by: INTERNAL MEDICINE

## 2020-03-19 PROCEDURE — 93010 EKG 12-LEAD: ICD-10-PCS | Mod: ,,, | Performed by: INTERNAL MEDICINE

## 2020-03-19 PROCEDURE — 71260 CT CHEST ABDOMEN PELVIS WITH CONTRAST (XPD): ICD-10-PCS | Mod: 26,,, | Performed by: RADIOLOGY

## 2020-03-19 PROCEDURE — 74177 CT ABD & PELVIS W/CONTRAST: CPT | Mod: 26,,, | Performed by: RADIOLOGY

## 2020-03-19 PROCEDURE — 74177 CT ABD & PELVIS W/CONTRAST: CPT | Mod: TC

## 2020-03-19 PROCEDURE — 93005 ELECTROCARDIOGRAM TRACING: CPT

## 2020-03-19 PROCEDURE — 71260 CT THORAX DX C+: CPT | Mod: 26,,, | Performed by: RADIOLOGY

## 2020-03-19 RX ADMIN — IOHEXOL 75 ML: 350 INJECTION, SOLUTION INTRAVENOUS at 12:03

## 2020-03-19 RX ADMIN — IOHEXOL 1000 ML: 9 SOLUTION ORAL at 11:03

## 2020-03-19 NOTE — PROGRESS NOTES
Went in to the Exam room to give pre-op instructions.  Patient was lying on the exam table slightly curled up.  Stated that she is in a lot of pain.  Showed her the KASI drain that she would have post-op.  KASI instruction sheet given.  Surgery Guide booklet given.  Asked if she can leave.  Explained that we will need further testing and that we will be in touch with her.

## 2020-03-20 ENCOUNTER — DOCUMENTATION ONLY (OUTPATIENT)
Dept: HEMATOLOGY/ONCOLOGY | Facility: CLINIC | Age: 65
End: 2020-03-20

## 2020-03-20 DIAGNOSIS — I82.90 ACUTE DEEP VEIN THROMBOSIS (DVT) OF NON-EXTREMITY VEIN: Primary | ICD-10-CM

## 2020-03-20 DIAGNOSIS — I82.90 ACUTE DEEP VEIN THROMBOSIS (DVT) OF NON-EXTREMITY VEIN: ICD-10-CM

## 2020-03-20 NOTE — PROGRESS NOTES
Apparently the CT of the chest, abdomen and pelvis that she had yesterday prior to his surgery showed a thrombus around the port and the innominate/jugular junction.  Mrs. Rojas will be started on apixaban 10 mg b.i.d. for a week followed by 5 mg twice a day.

## 2020-03-23 ENCOUNTER — TELEPHONE (OUTPATIENT)
Dept: HEMATOLOGY/ONCOLOGY | Facility: CLINIC | Age: 65
End: 2020-03-23

## 2020-03-23 NOTE — TELEPHONE ENCOUNTER
----- Message from Varun Armas MD sent at 3/20/2020  1:50 PM CDT -----  We will take care of it.  Naima I left a prescription on your desk that we need to fax to her pharmacy.  She needs to be told to take 10 mg twice a day for a week and then 5 mg twice a day.  She needs to hold it 2 days prior to her surgery.    CT  ----- Message -----  From: Vee Raya MD  Sent: 3/20/2020   1:31 PM CDT  To: Varun Armas MD    Do you want me to start.  I would prefer if you do it so you can manage after surgery as well...    vee    ----- Message -----  From: Varun Armas MD  Sent: 3/19/2020   5:03 PM CDT  To: Vee Raya MD    Vee,  She should probably be anticoagulated with apixaban.  Apixaban should be discontinued 2 days before you operate on her.  Do you want us to send a prescription her do you want descended yourself?  I am okay either way.  Please advise.  Thanks,    Varun  ----- Message -----  From: Vee Raya MD  Sent: 3/19/2020   4:48 PM CDT  To: Varun Armas MD    Her CT shows a DVT in the jugular by the port...

## 2020-03-23 NOTE — TELEPHONE ENCOUNTER
Contacted patient at home number. Patient spouse answer.   Instructions for medication explained. Explained rationale of need for medication.  He voiced understanding. They were advised to notify clinic if any evidence of bleeding was identified.     He had questions about the surgery and if still taking place.  Advised would relay message to Dr. Raya's staff for confirmation.   He expressed gratitude.

## 2020-03-25 ENCOUNTER — TELEPHONE (OUTPATIENT)
Dept: HEMATOLOGY/ONCOLOGY | Facility: CLINIC | Age: 65
End: 2020-03-25

## 2020-03-25 NOTE — TELEPHONE ENCOUNTER
Spoke with patient spouse about patient's eliquis.  Prior authorization completed and medication approval for one month.  Notified patient and spouse. They received the medication today.   Reiterated instructions.  Advised patient spouse to hold starting 3/31/2020 prior to breast surgery.  He had questions regarding insurance, and expiration.   Was unsure  It appears medicaid application currently pending.

## 2020-03-26 NOTE — H&P (VIEW-ONLY)
New Breast Cancer  History and Physical  [facility]  Department of Surgery    REFERRING PROVIDER: No referring provider defined for this encounter.    CHIEF COMPLAINT: right breast cancer    Subjective:      Briseyda Rojas is a 64 y.o. postmenopausal female referred for evaluation of recently diagnosed carcinoma of the right breast. The patient was initially referred for surgical evaluation of an axillary lump on exam first noted several months ago.  She does report that is has gotten larger over time. 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    This is her first mammogram ever.        Patient does routinely do self breast exams.  Patient has noted a change on breast exam.  Patient denies nipple discharge. Patient denies to previous breast biopsy. Patient denies a personal history of breast cancer.        INTERVAL HISTORY:  Patient presents f/u after chemo.  Has numerous complaints regarding chemotherapy.  Loss of appetite and neuropathy and back pain are the major ones.  disucssed the case with Dr. De Leon.    Diagnostic imagin2019  Left  US Breast Bilateral Limited  There is a 5 mm complicated cyst seen in the left breast at 11 o'clock.      Right     Mammo Digital Diagnostic Bilat w/ Joaquin  There is 33 mm architectural distortion seen in the upper outer quadrant of the right breast, 46 cm from the nipple.      There is an area measuring at least 81 mm of fine pleomorphic calcifications seen in the upper medial and lateral regions of the right breast, 46 cm from the nipple.      There are no corresponding lymph nodes seen on this modality.      US Breast Bilateral Limited  There is a 67 mm x 41 mm x 72 mm lymph node seen in the right axilla.  (     There are no corresponding calcifications seen on this modality.       Impression:  Right  Calcifications: Right breast 81 mm calcifications at the upper position. Assessment: 5 - Highly suggestive of malignancy. Stereotactic/Baylee Biopsy is recommended.   Architectural Distortion: Right breast 33 mm architectural distortion at the upper outer position. Assessment: 5 - Highly suggestive of malignancy. Stereotactic/baylee Biopsy is recommended.   Lymph Node: Right axilla 67 mm x 41 mm x 72 mm lymph node. Assessment: 5 - Highly suggestive of malignancy. Ultrasound-guided biopsy is recommended.      Left  There is no mammographic or sonographic evidence of malignancy in the left breast.     BI-RADS Category:   Overall: 5 - Highly Suggestive of Malignancy     Recommendation:  Stereotactic Biopsy is recommended.  Stereotactic Biopsy is recommended.  Ultrasound-guided biopsy is recommended.    Pathology:  FINAL PATHOLOGIC DIAGNOSIS  1. Right breast, 12 o'clock calcifications, stereotactic biopsy:  Fibroadipose tissue with scant benign breast elements.  Microcalcifications are identified in association with benign breast tissue.  Negative for atypia or malignancy.    -  The pathology results are benign however disconcordant to the imaging findings. Please schedule with Breast Surgery.  Thank you.    2. Right breast, upper inner calcifications, stereotactic biopsy:  Ductal carcinoma in situ (DCIS), high nuclear grade, cribriform type with apocrine features and comedonecrosis.  Microcalcifications are identified in association with DCIS.  No invasive carcinoma is identified.  Comment: Multiple foci of high-grade DCIS are identified with the largest single continuous focus measuring 6 mm.  DCIS pulmonary receptor markers are in progress and will be reported in an addendum.    -   The breast biopsy results are malignant and concordant.  Please schedule with breast surgery.    3. Right axillary lymph node, biopsy:  Invasive ductal carcinoma, likely metastatic, [place] histologic grade 3  (tubular formation: 3, nuclear  pleomorphism: 3, mitotic activity:    Triple negative (FISH negative)    Has 2 sons.        FAMILY History:  Mother ovarian cancer?  MGM breast cancer  Father unknown cancer  Sister unknown cancer  Brother lung cancer  Sister throat cancer    Past Medical History:   Diagnosis Date    Breast cancer     Hypertension      Past Surgical History:   Procedure Laterality Date    HYSTERECTOMY      INSERTION OF VENOUS ACCESS PORT Left 10/30/2019    Procedure: INSERTION, VENOUS ACCESS PORT;  Surgeon: Kraig Kahn Jr., MD;  Location: [facility];  Service: General;  Laterality: Left;  Left internal jugular Port-A-Cath Placement    WRIST FRACTURE SURGERY Bilateral      Current Outpatient Medications on File Prior to Visit   Medication Sig Dispense Refill    aspirin 81 MG Chew Take 1 tablet (81 mg total) by mouth once daily.  0    carvedilol (COREG) 12.5 MG tablet Take 1 tablet (12.5 mg total) by mouth 2 (two) times daily. 60 tablet 1    ibuprofen (ADVIL,MOTRIN) 800 MG tablet Take 800 mg by mouth every 8 (eight) hours as needed.      lisinopril (PRINIVIL,ZESTRIL) 20 MG tablet Take 1 tablet (20 mg total) by mouth once daily. 30 tablet 1    cyproheptadine (PERIACTIN) 4 mg tablet Take 1 tablet (4 mg total) by mouth 2 (two) times daily as needed (for appetite). 60 tablet 0    ondansetron (ZOFRAN) 8 MG tablet Take on every 12 hours x 3 days post chemotherapy, and then one every 12 hours as needed for nausea. 30 tablet 3     No current facility-administered medications on file prior to visit.      Social History     Socioeconomic History    Marital status:      Spouse name: Not on file    Number of children: Not on file    Years of education: Not on file    Highest education level: Not on file   Occupational History    Not on file   Social Needs    Financial resource strain: Not on file    Food insecurity:     Worry: Not on file     Inability: Not on file    Transportation  needs:     Medical: Not on file     Non-medical: Not on file   Tobacco Use    Smoking status: Current Every Day Smoker     Packs/day: 0.50     Years: 46.00     Pack years: 23.00    Smokeless tobacco: Current User    Tobacco comment: smoking few cig per day now, smoking cessation packet  given & discussed   Substance and Sexual Activity    Alcohol use: No    Drug use: No    Sexual activity: Not on file   Lifestyle    Physical activity:     Days per week: Not on file     Minutes per session: Not on file    Stress: Not on file   Relationships    Social connections:     Talks on phone: Not on file     Gets together: Not on file     Attends Taoism service: Not on file     Active member of club or organization: Not on file     Attends meetings of clubs or organizations: Not on file     Relationship status: Not on file   Other Topics Concern    Not on file   Social History Narrative    Not on file     Family History   Problem Relation Age of Onset    Ovarian cancer Mother     Breast cancer Maternal Grandmother         Review of Systems  Review of Systems   Constitutional: Negative for fatigue and fever.   HENT: Negative for sore throat and trouble swallowing.    Eyes: Negative for visual disturbance.   Respiratory: Negative for cough and shortness of breath.    Cardiovascular: Negative for chest pain and palpitations.   Gastrointestinal: Negative for abdominal pain, constipation, diarrhea and nausea.   Genitourinary: Negative for difficulty urinating and dysuria.   Musculoskeletal: Negative for arthralgias and back pain.   Neurological: Negative for dizziness, weakness and headaches.   Hematological: Negative for adenopathy.        Objective:   PHYSICAL EXAM:  BP (!) 157/84 (BP Location: Left arm, Patient Position: Sitting, BP Method: Medium (Automatic))   Pulse 73   Temp 98.4 °F (36.9 °C) (Oral)   Wt 44.8 kg (98 lb 14 oz)   BMI 15.96 kg/m²     Physical Exam   Pulmonary/Chest: She exhibits no tenderness  and no deformity. Right breast exhibits mass. Right breast exhibits no inverted nipple, no nipple discharge, no skin change and no tenderness. Left breast exhibits no inverted nipple, no mass, no nipple discharge, no skin change and no tenderness.       Lymphadenopathy:     She has no cervical adenopathy.     She has axillary adenopathy.        Right axillary: Lateral adenopathy present.        Right: Supraclavicular adenopathy present.        Left: No supraclavicular adenopathy present.       Radiology review: Images personally reviewed by me in the clinic.       Assessment:      Briseyda Rojas is a 64 y.o. postmenopausal female with recently diagnosed carcinoma of the right breast.      Plan:    Options for management were discussed with the patient and her family. We reviewed the existing data noting the equivalency of breast conserving surgery with radiation therapy and mastectomy. We also reviewed the guidelines of the National Comprehensive Cancer Network for Stage 3 breast carcinoma. We discussed the need for lumpectomy margins to be negative for carcinoma, the necessity for postoperative radiation therapy after breast conservation in most cases, the possibility of a failed or false negative sentinel lymph node biopsy and the potential need for complete lymphadenectomy for a failed or positive sentinel lymph node biopsy were fully discussed. In the setting of mastectomy, delayed or immediate reconstruction options are available and were discussed.     Patient was educated on breast cancer, receptors, wire localization lumpectomy, mastectomy, sentinel lymph node mapping and biopsy, axillary lymph node dissection, reconstruction, breast prosthesis with post-mastectomy bra and radiation therapy. Patient was given patient information binder including [facility] breast cancer treatment brochure.  All her questions were answered.    Patient was in a patel.  Will call Dr. De Leon and discuss options.    Will restage given  advances stage at presentation, however she has had a very nice clinical response to chemo.  Needs MRM. Followed by XRT.

## 2020-03-26 NOTE — PROGRESS NOTES
New Breast Cancer  History and Physical  [facility]  Department of Surgery    REFERRING PROVIDER: No referring provider defined for this encounter.    CHIEF COMPLAINT: right breast cancer    Subjective:      Briseyda Rojas is a 64 y.o. postmenopausal female referred for evaluation of recently diagnosed carcinoma of the right breast. The patient was initially referred for surgical evaluation of an axillary lump on exam first noted several months ago.  She does report that is has gotten larger over time. 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    This is her first mammogram ever.        Patient does routinely do self breast exams.  Patient has noted a change on breast exam.  Patient denies nipple discharge. Patient denies to previous breast biopsy. Patient denies a personal history of breast cancer.        INTERVAL HISTORY:  Patient presents f/u after chemo.  Has numerous complaints regarding chemotherapy.  Loss of appetite and neuropathy and back pain are the major ones.  disucssed the case with Dr. De Leon.    Diagnostic imagin2019  Left  US Breast Bilateral Limited  There is a 5 mm complicated cyst seen in the left breast at 11 o'clock.      Right     Mammo Digital Diagnostic Bilat w/ Joaquin  There is 33 mm architectural distortion seen in the upper outer quadrant of the right breast, 46 cm from the nipple.      There is an area measuring at least 81 mm of fine pleomorphic calcifications seen in the upper medial and lateral regions of the right breast, 46 cm from the nipple.      There are no corresponding lymph nodes seen on this modality.      US Breast Bilateral Limited  There is a 67 mm x 41 mm x 72 mm lymph node seen in the right axilla.  (     There are no corresponding calcifications seen on this modality.       Impression:  Right  Calcifications: Right breast 81 mm calcifications at the upper position. Assessment: 5 - Highly suggestive of malignancy. Stereotactic/Baylee Biopsy is recommended.   Architectural Distortion: Right breast 33 mm architectural distortion at the upper outer position. Assessment: 5 - Highly suggestive of malignancy. Stereotactic/baylee Biopsy is recommended.   Lymph Node: Right axilla 67 mm x 41 mm x 72 mm lymph node. Assessment: 5 - Highly suggestive of malignancy. Ultrasound-guided biopsy is recommended.      Left  There is no mammographic or sonographic evidence of malignancy in the left breast.     BI-RADS Category:   Overall: 5 - Highly Suggestive of Malignancy     Recommendation:  Stereotactic Biopsy is recommended.  Stereotactic Biopsy is recommended.  Ultrasound-guided biopsy is recommended.    Pathology:  FINAL PATHOLOGIC DIAGNOSIS  1. Right breast, 12 o'clock calcifications, stereotactic biopsy:  Fibroadipose tissue with scant benign breast elements.  Microcalcifications are identified in association with benign breast tissue.  Negative for atypia or malignancy.    -  The pathology results are benign however disconcordant to the imaging findings. Please schedule with Breast Surgery.  Thank you.    2. Right breast, upper inner calcifications, stereotactic biopsy:  Ductal carcinoma in situ (DCIS), high nuclear grade, cribriform type with apocrine features and comedonecrosis.  Microcalcifications are identified in association with DCIS.  No invasive carcinoma is identified.  Comment: Multiple foci of high-grade DCIS are identified with the largest single continuous focus measuring 6 mm.  DCIS pulmonary receptor markers are in progress and will be reported in an addendum.    -   The breast biopsy results are malignant and concordant.  Please schedule with breast surgery.    3. Right axillary lymph node, biopsy:  Invasive ductal carcinoma, likely metastatic, [place] histologic grade 3  (tubular formation: 3, nuclear  pleomorphism: 3, mitotic activity:    Triple negative (FISH negative)    Has 2 sons.        FAMILY History:  Mother ovarian cancer?  MGM breast cancer  Father unknown cancer  Sister unknown cancer  Brother lung cancer  Sister throat cancer    Past Medical History:   Diagnosis Date    Breast cancer     Hypertension      Past Surgical History:   Procedure Laterality Date    HYSTERECTOMY      INSERTION OF VENOUS ACCESS PORT Left 10/30/2019    Procedure: INSERTION, VENOUS ACCESS PORT;  Surgeon: Kraig Kahn Jr., MD;  Location: [facility];  Service: General;  Laterality: Left;  Left internal jugular Port-A-Cath Placement    WRIST FRACTURE SURGERY Bilateral      Current Outpatient Medications on File Prior to Visit   Medication Sig Dispense Refill    aspirin 81 MG Chew Take 1 tablet (81 mg total) by mouth once daily.  0    carvedilol (COREG) 12.5 MG tablet Take 1 tablet (12.5 mg total) by mouth 2 (two) times daily. 60 tablet 1    ibuprofen (ADVIL,MOTRIN) 800 MG tablet Take 800 mg by mouth every 8 (eight) hours as needed.      lisinopril (PRINIVIL,ZESTRIL) 20 MG tablet Take 1 tablet (20 mg total) by mouth once daily. 30 tablet 1    cyproheptadine (PERIACTIN) 4 mg tablet Take 1 tablet (4 mg total) by mouth 2 (two) times daily as needed (for appetite). 60 tablet 0    ondansetron (ZOFRAN) 8 MG tablet Take on every 12 hours x 3 days post chemotherapy, and then one every 12 hours as needed for nausea. 30 tablet 3     No current facility-administered medications on file prior to visit.      Social History     Socioeconomic History    Marital status:      Spouse name: Not on file    Number of children: Not on file    Years of education: Not on file    Highest education level: Not on file   Occupational History    Not on file   Social Needs    Financial resource strain: Not on file    Food insecurity:     Worry: Not on file     Inability: Not on file    Transportation  needs:     Medical: Not on file     Non-medical: Not on file   Tobacco Use    Smoking status: Current Every Day Smoker     Packs/day: 0.50     Years: 46.00     Pack years: 23.00    Smokeless tobacco: Current User    Tobacco comment: smoking few cig per day now, smoking cessation packet  given & discussed   Substance and Sexual Activity    Alcohol use: No    Drug use: No    Sexual activity: Not on file   Lifestyle    Physical activity:     Days per week: Not on file     Minutes per session: Not on file    Stress: Not on file   Relationships    Social connections:     Talks on phone: Not on file     Gets together: Not on file     Attends Gnosticism service: Not on file     Active member of club or organization: Not on file     Attends meetings of clubs or organizations: Not on file     Relationship status: Not on file   Other Topics Concern    Not on file   Social History Narrative    Not on file     Family History   Problem Relation Age of Onset    Ovarian cancer Mother     Breast cancer Maternal Grandmother         Review of Systems  Review of Systems   Constitutional: Negative for fatigue and fever.   HENT: Negative for sore throat and trouble swallowing.    Eyes: Negative for visual disturbance.   Respiratory: Negative for cough and shortness of breath.    Cardiovascular: Negative for chest pain and palpitations.   Gastrointestinal: Negative for abdominal pain, constipation, diarrhea and nausea.   Genitourinary: Negative for difficulty urinating and dysuria.   Musculoskeletal: Negative for arthralgias and back pain.   Neurological: Negative for dizziness, weakness and headaches.   Hematological: Negative for adenopathy.        Objective:   PHYSICAL EXAM:  BP (!) 157/84 (BP Location: Left arm, Patient Position: Sitting, BP Method: Medium (Automatic))   Pulse 73   Temp 98.4 °F (36.9 °C) (Oral)   Wt 44.8 kg (98 lb 14 oz)   BMI 15.96 kg/m²     Physical Exam   Pulmonary/Chest: She exhibits no tenderness  and no deformity. Right breast exhibits mass. Right breast exhibits no inverted nipple, no nipple discharge, no skin change and no tenderness. Left breast exhibits no inverted nipple, no mass, no nipple discharge, no skin change and no tenderness.       Lymphadenopathy:     She has no cervical adenopathy.     She has axillary adenopathy.        Right axillary: Lateral adenopathy present.        Right: Supraclavicular adenopathy present.        Left: No supraclavicular adenopathy present.       Radiology review: Images personally reviewed by me in the clinic.       Assessment:      Briseyda Rojas is a 64 y.o. postmenopausal female with recently diagnosed carcinoma of the right breast.      Plan:    Options for management were discussed with the patient and her family. We reviewed the existing data noting the equivalency of breast conserving surgery with radiation therapy and mastectomy. We also reviewed the guidelines of the National Comprehensive Cancer Network for Stage 3 breast carcinoma. We discussed the need for lumpectomy margins to be negative for carcinoma, the necessity for postoperative radiation therapy after breast conservation in most cases, the possibility of a failed or false negative sentinel lymph node biopsy and the potential need for complete lymphadenectomy for a failed or positive sentinel lymph node biopsy were fully discussed. In the setting of mastectomy, delayed or immediate reconstruction options are available and were discussed.     Patient was educated on breast cancer, receptors, wire localization lumpectomy, mastectomy, sentinel lymph node mapping and biopsy, axillary lymph node dissection, reconstruction, breast prosthesis with post-mastectomy bra and radiation therapy. Patient was given patient information binder including [facility] breast cancer treatment brochure.  All her questions were answered.    Patient was in a patel.  Will call Dr. De Leon and discuss options.    Will restage given  advances stage at presentation, however she has had a very nice clinical response to chemo.  Needs MRM. Followed by XRT.

## 2020-03-31 ENCOUNTER — ANESTHESIA EVENT (OUTPATIENT)
Dept: SURGERY | Facility: OTHER | Age: 65
End: 2020-03-31
Payer: MEDICAID

## 2020-04-02 ENCOUNTER — TELEPHONE (OUTPATIENT)
Dept: SURGERY | Facility: CLINIC | Age: 65
End: 2020-04-02

## 2020-04-02 DIAGNOSIS — C50.911 MALIGNANT NEOPLASM OF RIGHT BREAST IN FEMALE, ESTROGEN RECEPTOR NEGATIVE, UNSPECIFIED SITE OF BREAST: Primary | ICD-10-CM

## 2020-04-02 DIAGNOSIS — Z17.1 MALIGNANT NEOPLASM OF RIGHT BREAST IN FEMALE, ESTROGEN RECEPTOR NEGATIVE, UNSPECIFIED SITE OF BREAST: Primary | ICD-10-CM

## 2020-04-02 NOTE — TELEPHONE ENCOUNTER
Spoke to patient's .  Notified of 500 arrival time for 700 surgery at [facility].  Reviewed NPO requirements for tonight and morning of surgery.

## 2020-04-02 NOTE — ANESTHESIA PREPROCEDURE EVALUATION
04/02/2020  Briseyda Rojas is a 64 y.o., female.    Anesthesia Evaluation    I have reviewed the Patient Summary Reports.    I have reviewed the Nursing Notes.   I have reviewed the Medications.     Review of Systems  Anesthesia Hx:  No problems with previous Anesthesia  History of prior surgery of interest to airway management or planning: Previous anesthesia: General Denies Family Hx of Anesthesia complications.   Denies Personal Hx of Anesthesia complications.   Hematology/Oncology:         -- Anemia: Hematology Comments: Hct 27,probably from chemo Current/Recent Cancer. right chemotherapy and surgery    EENT/Dental:EENT/Dental Normal   Cardiovascular:   Exercise tolerance: poor Hypertension, well controlled ECG has been reviewed. NSR w non sp st t wave changes   Pulmonary:  Pulmonary Normal    Hepatic/GI:  Hepatic/GI Normal    Musculoskeletal:  Musculoskeletal Normal    Neurological:  Neurology Normal    Endocrine:  Endocrine Normal    Dermatological:  Skin Normal    Psych:  Psychiatric Normal           Physical Exam  General:  Well nourished    Airway/Jaw/Neck:  Airway Findings: Mouth Opening: Normal Tongue: Normal  General Airway Assessment: Adult, Good  Mallampati: I  TM Distance: Normal, at least 6 cm  Jaw/Neck Findings:  Neck ROM: Normal ROM      Dental:  Dental Findings: Edentulous   Chest/Lungs:  Chest/Lungs Clear       Skin:  Skin Findings:  Port a cath left chest     Mental Status:  Mental Status Findings:  Cooperative, Alert and Oriented         Anesthesia Plan  Type of Anesthesia, risks & benefits discussed:  Anesthesia Type:  general  Patient's Preference:   Intra-op Monitoring Plan: standard ASA monitors  Intra-op Monitoring Plan Comments:   Post Op Pain Control Plan: per primary service following discharge from PACU and multimodal analgesia  Post Op Pain Control Plan Comments:   Induction:    IV  Beta Blocker:         Informed Consent: Patient understands risks and agrees with Anesthesia plan.  Questions answered. Anesthesia consent signed with patient.  ASA Score: 3     Day of Surgery Review of History & Physical:    H&P update referred to the surgeon.     Anesthesia Plan Notes: Anemic,has port left chest,was on Eliquis for issue w port a cath        Ready For Surgery From Anesthesia Perspective.

## 2020-04-02 NOTE — TELEPHONE ENCOUNTER
Left VM with my direct contact information, patient advised to give a return call with any questions or concerns rt surgery arrival time for 0500 for 0700 at [facility]

## 2020-04-02 NOTE — PRE ADMISSION SCREENING
Pre admit phone call completed with patient's , Torin..    Instructions given to patient about NPO status as follows:     The evening before surgery do not eat anything after 9 p.m. ( this includes hard candy, chewing gum and mints).  You may only have GATORADE, POWERADE AND WATER from 9 p.m. until you leave your home. DO NOT  DRINK ANY LIQUIDS ON THE WAY TO THE HOSPITAL.      Patient was also instructed on the below information:    If you will be discharged the same day as your procedure, please arrange for a responsible adult to drive you home or  to accompany you if traveling by taxi.  YOU WILL NOT BE PERMITTED TO DRIVE OR TO LEAVE THE HOSPITAL ALONE AFTER SURGERY.  It is strongly recommended that you arrange for someone to remain with you for the first 24 hrs following your surgery.    Informed patient's  of visitor restrictions.  Patient to check in the [place] [facility] with  to be kept informed via phonecalls.  Patient to be picked up at same [place] [facility] upon discharge.    Patient verbalized understanding of above instructions.

## 2020-04-02 NOTE — PRE ADMISSION SCREENING
Attempted to contact patient to conduct PreAdmit interview and provide instructions.  No answer, unable to leave message.

## 2020-04-03 ENCOUNTER — ANESTHESIA (OUTPATIENT)
Dept: SURGERY | Facility: OTHER | Age: 65
End: 2020-04-03
Payer: MEDICAID

## 2020-04-03 ENCOUNTER — HOSPITAL ENCOUNTER (OUTPATIENT)
Facility: OTHER | Age: 65
Discharge: HOME | End: 2020-04-03
Attending: SURGERY | Admitting: SURGERY
Payer: MEDICAID

## 2020-04-03 VITALS
TEMPERATURE: 98 F | SYSTOLIC BLOOD PRESSURE: 159 MMHG | BODY MASS INDEX: 17.19 KG/M2 | RESPIRATION RATE: 16 BRPM | WEIGHT: 107 LBS | DIASTOLIC BLOOD PRESSURE: 87 MMHG | HEIGHT: 66 IN | HEART RATE: 63 BPM | OXYGEN SATURATION: 100 %

## 2020-04-03 DIAGNOSIS — Z17.1 MALIGNANT NEOPLASM OF LOWER-INNER QUADRANT OF RIGHT BREAST OF FEMALE, ESTROGEN RECEPTOR NEGATIVE: Primary | ICD-10-CM

## 2020-04-03 DIAGNOSIS — C50.311 MALIGNANT NEOPLASM OF LOWER-INNER QUADRANT OF RIGHT BREAST OF FEMALE, ESTROGEN RECEPTOR NEGATIVE: Primary | ICD-10-CM

## 2020-04-03 DIAGNOSIS — C50.919 BREAST CANCER: ICD-10-CM

## 2020-04-03 PROCEDURE — 25000003 PHARM REV CODE 250: Performed by: SURGERY

## 2020-04-03 PROCEDURE — 88307 PR  SURG PATH,LEVEL V: ICD-10-PCS | Mod: 26,,, | Performed by: PATHOLOGY

## 2020-04-03 PROCEDURE — 37000009 HC ANESTHESIA EA ADD 15 MINS: Performed by: SURGERY

## 2020-04-03 PROCEDURE — 27201423 OPTIME MED/SURG SUP & DEVICES STERILE SUPPLY: Performed by: SURGERY

## 2020-04-03 PROCEDURE — S0020 INJECTION, BUPIVICAINE HYDRO: HCPCS | Performed by: SURGERY

## 2020-04-03 PROCEDURE — 71000033 HC RECOVERY, INTIAL HOUR: Performed by: SURGERY

## 2020-04-03 PROCEDURE — 36000707: Performed by: SURGERY

## 2020-04-03 PROCEDURE — 63600175 PHARM REV CODE 636 W HCPCS: Performed by: SURGERY

## 2020-04-03 PROCEDURE — 19307 MAST MOD RAD: CPT | Mod: RT,,, | Performed by: SURGERY

## 2020-04-03 PROCEDURE — 25000003 PHARM REV CODE 250: Performed by: NURSE ANESTHETIST, CERTIFIED REGISTERED

## 2020-04-03 PROCEDURE — 36000706: Performed by: SURGERY

## 2020-04-03 PROCEDURE — 88307 TISSUE EXAM BY PATHOLOGIST: CPT | Mod: 26,,, | Performed by: PATHOLOGY

## 2020-04-03 PROCEDURE — C9290 INJ, BUPIVACAINE LIPOSOME: HCPCS | Performed by: SURGERY

## 2020-04-03 PROCEDURE — 63600175 PHARM REV CODE 636 W HCPCS: Performed by: ANESTHESIOLOGY

## 2020-04-03 PROCEDURE — C1729 CATH, DRAINAGE: HCPCS | Performed by: SURGERY

## 2020-04-03 PROCEDURE — 71000016 HC POSTOP RECOV ADDL HR: Performed by: SURGERY

## 2020-04-03 PROCEDURE — 71000039 HC RECOVERY, EACH ADD'L HOUR: Performed by: SURGERY

## 2020-04-03 PROCEDURE — 25000003 PHARM REV CODE 250: Performed by: ANESTHESIOLOGY

## 2020-04-03 PROCEDURE — 71000015 HC POSTOP RECOV 1ST HR: Performed by: SURGERY

## 2020-04-03 PROCEDURE — 63600175 PHARM REV CODE 636 W HCPCS: Performed by: NURSE ANESTHETIST, CERTIFIED REGISTERED

## 2020-04-03 PROCEDURE — 19307 PR MASTECTOMY, MODIFIED RADICAL: ICD-10-PCS | Mod: RT,,, | Performed by: SURGERY

## 2020-04-03 PROCEDURE — 88307 TISSUE EXAM BY PATHOLOGIST: CPT | Performed by: PATHOLOGY

## 2020-04-03 PROCEDURE — 00404 ANES INTEG SYS RAD/MODF BRST: CPT | Performed by: SURGERY

## 2020-04-03 PROCEDURE — C1894 INTRO/SHEATH, NON-LASER: HCPCS | Performed by: SURGERY

## 2020-04-03 PROCEDURE — 37000008 HC ANESTHESIA 1ST 15 MINUTES: Performed by: SURGERY

## 2020-04-03 RX ORDER — OXYCODONE HYDROCHLORIDE 5 MG/1
5 TABLET ORAL
Status: DISCONTINUED | OUTPATIENT
Start: 2020-04-03 | End: 2020-04-03 | Stop reason: HOSPADM

## 2020-04-03 RX ORDER — LIDOCAINE HYDROCHLORIDE 20 MG/ML
INJECTION INTRAVENOUS
Status: DISCONTINUED | OUTPATIENT
Start: 2020-04-03 | End: 2020-04-03

## 2020-04-03 RX ORDER — SODIUM CHLORIDE 0.9 % (FLUSH) 0.9 %
3 SYRINGE (ML) INJECTION
Status: DISCONTINUED | OUTPATIENT
Start: 2020-04-03 | End: 2020-04-03 | Stop reason: HOSPADM

## 2020-04-03 RX ORDER — EPHEDRINE SULFATE 50 MG/ML
INJECTION, SOLUTION INTRAVENOUS
Status: DISCONTINUED | OUTPATIENT
Start: 2020-04-03 | End: 2020-04-03

## 2020-04-03 RX ORDER — BUPIVACAINE HYDROCHLORIDE 5 MG/ML
INJECTION, SOLUTION EPIDURAL; INTRACAUDAL
Status: DISCONTINUED | OUTPATIENT
Start: 2020-04-03 | End: 2020-04-03 | Stop reason: HOSPADM

## 2020-04-03 RX ORDER — ONDANSETRON 2 MG/ML
4 INJECTION INTRAMUSCULAR; INTRAVENOUS DAILY PRN
Status: DISCONTINUED | OUTPATIENT
Start: 2020-04-03 | End: 2020-04-03 | Stop reason: HOSPADM

## 2020-04-03 RX ORDER — SODIUM CHLORIDE 9 MG/ML
INJECTION, SOLUTION INTRAVENOUS CONTINUOUS
Status: DISCONTINUED | OUTPATIENT
Start: 2020-04-03 | End: 2020-04-03 | Stop reason: HOSPADM

## 2020-04-03 RX ORDER — CARVEDILOL 12.5 MG/1
12.5 TABLET ORAL
Status: COMPLETED | OUTPATIENT
Start: 2020-04-03 | End: 2020-04-03

## 2020-04-03 RX ORDER — HYDROCODONE BITARTRATE AND ACETAMINOPHEN 5; 325 MG/1; MG/1
1 TABLET ORAL EVERY 6 HOURS PRN
Qty: 20 TABLET | Refills: 0 | Status: ON HOLD | OUTPATIENT
Start: 2020-04-03 | End: 2020-07-21 | Stop reason: HOSPADM

## 2020-04-03 RX ORDER — FENTANYL CITRATE 50 UG/ML
INJECTION, SOLUTION INTRAMUSCULAR; INTRAVENOUS
Status: DISCONTINUED | OUTPATIENT
Start: 2020-04-03 | End: 2020-04-03

## 2020-04-03 RX ORDER — ONDANSETRON 2 MG/ML
INJECTION INTRAMUSCULAR; INTRAVENOUS
Status: DISCONTINUED | OUTPATIENT
Start: 2020-04-03 | End: 2020-04-03

## 2020-04-03 RX ORDER — PROPOFOL 10 MG/ML
VIAL (ML) INTRAVENOUS
Status: DISCONTINUED | OUTPATIENT
Start: 2020-04-03 | End: 2020-04-03

## 2020-04-03 RX ORDER — HYDROMORPHONE HYDROCHLORIDE 2 MG/ML
0.4 INJECTION, SOLUTION INTRAMUSCULAR; INTRAVENOUS; SUBCUTANEOUS EVERY 5 MIN PRN
Status: DISCONTINUED | OUTPATIENT
Start: 2020-04-03 | End: 2020-04-03 | Stop reason: HOSPADM

## 2020-04-03 RX ORDER — GLYCOPYRROLATE 0.2 MG/ML
INJECTION INTRAMUSCULAR; INTRAVENOUS
Status: DISCONTINUED | OUTPATIENT
Start: 2020-04-03 | End: 2020-04-03

## 2020-04-03 RX ORDER — NEOSTIGMINE METHYLSULFATE 1 MG/ML
INJECTION, SOLUTION INTRAVENOUS
Status: DISCONTINUED | OUTPATIENT
Start: 2020-04-03 | End: 2020-04-03

## 2020-04-03 RX ORDER — SODIUM CHLORIDE, SODIUM LACTATE, POTASSIUM CHLORIDE, CALCIUM CHLORIDE 600; 310; 30; 20 MG/100ML; MG/100ML; MG/100ML; MG/100ML
INJECTION, SOLUTION INTRAVENOUS CONTINUOUS PRN
Status: DISCONTINUED | OUTPATIENT
Start: 2020-04-03 | End: 2020-04-03

## 2020-04-03 RX ORDER — HYDROCODONE BITARTRATE AND ACETAMINOPHEN 5; 325 MG/1; MG/1
1 TABLET ORAL EVERY 4 HOURS PRN
Status: DISCONTINUED | OUTPATIENT
Start: 2020-04-03 | End: 2020-04-03 | Stop reason: HOSPADM

## 2020-04-03 RX ORDER — ROCURONIUM BROMIDE 10 MG/ML
INJECTION, SOLUTION INTRAVENOUS
Status: DISCONTINUED | OUTPATIENT
Start: 2020-04-03 | End: 2020-04-03

## 2020-04-03 RX ORDER — CEFAZOLIN SODIUM 1 G/3ML
2 INJECTION, POWDER, FOR SOLUTION INTRAMUSCULAR; INTRAVENOUS
Status: COMPLETED | OUTPATIENT
Start: 2020-04-03 | End: 2020-04-03

## 2020-04-03 RX ORDER — MUPIROCIN 20 MG/G
OINTMENT TOPICAL 2 TIMES DAILY
Status: DISCONTINUED | OUTPATIENT
Start: 2020-04-03 | End: 2020-04-03 | Stop reason: HOSPADM

## 2020-04-03 RX ORDER — LIDOCAINE HYDROCHLORIDE 10 MG/ML
1 INJECTION, SOLUTION EPIDURAL; INFILTRATION; INTRACAUDAL; PERINEURAL ONCE
Status: DISCONTINUED | OUTPATIENT
Start: 2020-04-03 | End: 2020-04-03 | Stop reason: HOSPADM

## 2020-04-03 RX ORDER — MEPERIDINE HYDROCHLORIDE 25 MG/ML
12.5 INJECTION INTRAMUSCULAR; INTRAVENOUS; SUBCUTANEOUS ONCE AS NEEDED
Status: DISCONTINUED | OUTPATIENT
Start: 2020-04-03 | End: 2020-04-03 | Stop reason: HOSPADM

## 2020-04-03 RX ADMIN — ONDANSETRON 4 MG: 2 INJECTION INTRAMUSCULAR; INTRAVENOUS at 09:04

## 2020-04-03 RX ADMIN — FENTANYL CITRATE 50 MCG: 50 INJECTION, SOLUTION INTRAMUSCULAR; INTRAVENOUS at 09:04

## 2020-04-03 RX ADMIN — SODIUM CHLORIDE, SODIUM LACTATE, POTASSIUM CHLORIDE, AND CALCIUM CHLORIDE: 600; 310; 30; 20 INJECTION, SOLUTION INTRAVENOUS at 06:04

## 2020-04-03 RX ADMIN — HYDROMORPHONE HYDROCHLORIDE 0.4 MG: 2 INJECTION, SOLUTION INTRAMUSCULAR; INTRAVENOUS; SUBCUTANEOUS at 10:04

## 2020-04-03 RX ADMIN — CEFAZOLIN 2 G: 330 INJECTION, POWDER, FOR SOLUTION INTRAMUSCULAR; INTRAVENOUS at 07:04

## 2020-04-03 RX ADMIN — PROPOFOL 200 MG: 10 INJECTION, EMULSION INTRAVENOUS at 07:04

## 2020-04-03 RX ADMIN — FENTANYL CITRATE 100 MCG: 50 INJECTION, SOLUTION INTRAMUSCULAR; INTRAVENOUS at 07:04

## 2020-04-03 RX ADMIN — ROCURONIUM BROMIDE 20 MG: 10 INJECTION, SOLUTION INTRAVENOUS at 07:04

## 2020-04-03 RX ADMIN — EPHEDRINE SULFATE 25 MG: 50 INJECTION INTRAMUSCULAR; INTRAVENOUS; SUBCUTANEOUS at 07:04

## 2020-04-03 RX ADMIN — NEOSTIGMINE METHYLSULFATE 5 MG: 1 INJECTION INTRAVENOUS at 09:04

## 2020-04-03 RX ADMIN — CARVEDILOL 12.5 MG: 12.5 TABLET, FILM COATED ORAL at 05:04

## 2020-04-03 RX ADMIN — LIDOCAINE HYDROCHLORIDE 75 MG: 20 INJECTION, SOLUTION INTRAVENOUS at 07:04

## 2020-04-03 RX ADMIN — OXYCODONE 5 MG: 5 TABLET ORAL at 10:04

## 2020-04-03 RX ADMIN — FENTANYL CITRATE 50 MCG: 50 INJECTION, SOLUTION INTRAMUSCULAR; INTRAVENOUS at 08:04

## 2020-04-03 RX ADMIN — GLYCOPYRROLATE 0.8 MG: 0.2 INJECTION, SOLUTION INTRAMUSCULAR; INTRAVENOUS at 09:04

## 2020-04-03 NOTE — PLAN OF CARE
Briseyda Rojas has met all discharge criteria from Phase II. Vital Signs are stable, ambulating  without difficulty. Discharge instructions given, patient verbalized understanding. Discharged from facility via wheelchair in stable condition.

## 2020-04-03 NOTE — TRANSFER OF CARE
"Anesthesia Transfer of Care Note    Patient: Briseyda Rojas    Procedure(s) Performed: Procedure(s) (LRB):  MASTECTOMY, UNILATERAL (Right)  LYMPHADENECTOMY - axillary node dissection (Right)  MASTECTOMY, MODIFIED RADICAL (Right)    Patient location: PACU    Anesthesia Type: general    Transport from OR: Transported from OR on 2-3 L/min O2 by NC with adequate spontaneous ventilation    Post pain: adequate analgesia    Post assessment: no apparent anesthetic complications    Post vital signs: stable    Level of consciousness: responds to stimulation and awake    Nausea/Vomiting: no nausea/vomiting    Complications: none    Transfer of care protocol was followed      Last vitals:   Visit Vitals  BP (!) 147/88 (BP Location: Left arm, Patient Position: Lying)   Pulse 84   Temp 36.8 °C (98.3 °F) (Oral)   Resp 18   Ht 5' 6" (1.676 m)   Wt 48.5 kg (107 lb)   SpO2 100%   Breastfeeding? No   BMI 17.27 kg/m²     "

## 2020-04-03 NOTE — OP NOTE
Operative Note     4/3/2020    PRE-OP DIAGNOSIS: Malignant neoplasm of right breast in female, estrogen receptor negative, unspecified site of breast [C50.911, Z17.1]      POST-OP DIAGNOSIS: Post-Op Diagnosis Codes:     * Malignant neoplasm of right breast in female, estrogen receptor negative, unspecified site of breast [C50.911, Z17.1]    Procedure(s):  MASTECTOMY, MODIFIED RADICAL  RIGHT    SURGEON: Surgeon(s) and Role:     * Mirielle Raya MD - Primary    ANESTHESIA: General     OPERATIVE FINDINGS: clip x 2 in breast.  Fibrosis and scarring in the axilla.  Clip and reflector identified in axilla.    INDICATION FOR PROCEDURE: This patient presents with a history of cancer of the right breast    In my medical opinion and judgment, this medical or surgical procedure was not able to be safely postponed until April 22, 2020 ([place] Department of Health, Healthcare Facility Notice #[id number]).    PROCEDURE IN DETAIL:  Briseyda Rojas is a 64 y.o. female brought to the operating room for definitive surgery of cancer of the right breast.  The patient has elected to undergo right modified radical mastectomy. The patient was informed of the possible risks and complications of the procedure, including but not limited to anesthetic risks, bleeding, infection, and need for additional surgery.  The patient concurred with the proposed plan, and has given informed consent.  The site of surgery was properly noted/marked in the preoperative holding area.    The patient was then brought to the operating room and placed in the supine position with both upper extremities extended.  general anesthesia was performed.  Perioperative antibiotics were administered consisting of Ancef and a time out was performed confirming the patient, site, and procedure.  The right chest and axilla was then prepped and draped in the usual sterile fashion.    We turned our attention to the right breast where an elliptical incision was  fashioned incorporating the nipple areolar complex.  The incision was made with a 15-blade and extended through the subcutaneous tissues with Bovie cautery.  Skin flaps were raised to the clavicle superiorly.  We then proceeded to raise the remainder of the flaps to the lateral border of the sternum medially, to the inframammary fold inferiorly, and to the anterior border of the latissimus dorsi muscle laterally.  The breast tissue was sharply excised off the chest wall taking care to incorporate the pectoralis fascia while leaving the serratus fascia behind.     An axillary dissection was performed with removal of the associated lymph nodes and surrounding adipose tissue. This included levels I and II. This was accomplished by exposing the axillary vein superiorly. Small venous tributaries, lymphatics, and vessels were clipped and ligated or cauterized and divided. The subscapularis muscle was skeletonized. The long thoracic and thoracodorsal neurovascular bundles were identified and preserved. The tissue between the long thoracic and thoracodorsal bundle was removed.  Of note, at the end of the dissection, level 3 nodes were palpated for any abnormality and none were noted.      Neelima and exparel were placed in the wound.    The specimen was submitted to pathology. The wound was irrigated.A 19 [place] drain was placed. The skin incision was closed in layers with a 3-0 Vicryl suture for the deep dermis followed by a a running 4-0 monocryl used for the subcuticular closure. The drain was secured with sutures. Dermabond was applied along with a dry bulky gauze dressing.    At the end of my portion of the operation, all sponge, instrument, and needle counts x 2 were correct.    ESTIMATED BLOOD LOSS: Minimal    COMPLICATIONS: none    DISPOSITION: PACU in stable condition    ATTESTATION:   I performed the procedure.

## 2020-04-03 NOTE — OR NURSING
Attempted discharge instructions with both patient and spouse. Unable to verbalize understanding. Spouse states that pt is going home to sister's house and that she will be cared for by her daughter in law who is a medical assistant.  Also stated that home health will begin tomorrow. Instructed spouse to make sure daughter in law reads through discharge packet and call MD with any questions.  Spouse verbalized understanding.

## 2020-04-03 NOTE — DISCHARGE SUMMARY
[facility]  Discharge Summary  General Surgery      Admit Date: 4/3/2020    Discharge Date and Time:  04/03/2020 9:39 AM    Attending Physician: Mireille Raya MD     Discharge Provider: Mireille Raya    Reason for Admission: surgery    Procedures Performed: Procedure(s) (LRB):  MASTECTOMY, UNILATERAL (Right)  LYMPHADENECTOMY - axillary node dissection (Right)  MASTECTOMY, MODIFIED RADICAL (Right)    Final Diagnoses:   Principal Problem: right breast cancer     Discharged Condition: stable    Disposition: Home or Self Care    Follow Up/Patient Instructions: 7-14 days    Medications:  Reconciled Home Medications:      Medication List      START taking these medications    HYDROcodone-acetaminophen 5-325 mg per tablet  Commonly known as:  NORCO  Take 1 tablet by mouth every 6 (six) hours as needed for Pain.        CONTINUE taking these medications    apixaban 5 mg Tab  Commonly known as:  ELIQUIS  Take 10 mg twice a day for 1 week followed by 5 mg twice a day.     aspirin 81 MG Chew  Take 1 tablet (81 mg total) by mouth once daily.     carvediloL 12.5 MG tablet  Commonly known as:  COREG  Take 1 tablet (12.5 mg total) by mouth 2 (two) times daily.     cyproheptadine 4 mg tablet  Commonly known as:  PERIACTIN  Take 1 tablet (4 mg total) by mouth 2 (two) times daily as needed (for appetite).     gabapentin 300 MG capsule  Commonly known as:  NEURONTIN  Take 1 capsule (300 mg total) by mouth 3 (three) times daily.     ibuprofen 800 MG tablet  Commonly known as:  ADVIL,MOTRIN  Take 800 mg by mouth every 8 (eight) hours as needed.     lisinopriL 20 MG tablet  Commonly known as:  PRINIVIL,ZESTRIL  Take 1 tablet (20 mg total) by mouth once daily.     megestroL 400 mg/10 mL (40 mg/mL) Susp  Commonly known as:  MEGACE  Take 10 mLs (400 mg total) by mouth once daily.     ondansetron 8 MG tablet  Commonly known as:  ZOFRAN  Take on every 12 hours x 3 days post chemotherapy, and then one every 12 hours as needed  for nausea.     oxyCODONE 5 MG immediate release tablet  Commonly known as:  ROXICODONE  Take 1 every 6 hour as needed for pain          Discharge Procedure Orders   Diet general     Call MD for:  temperature >100.4     Call MD for:  persistent nausea and vomiting     Call MD for:  severe uncontrolled pain     Call MD for:  difficulty breathing, headache or visual disturbances     Call MD for:  redness, tenderness, or signs of infection (pain, swelling, redness, odor or green/yellow discharge around incision site)     Call MD for:  hives     Call MD for:  persistent dizziness or light-headedness     Call MD for:  extreme fatigue     Leave dressing on - Keep it clean, dry, and intact until clinic visit     Lifting restrictions     Activity as tolerated         Diet: regular    Activity: as tolerated

## 2020-04-03 NOTE — ANESTHESIA PROCEDURE NOTES
Intubation  Performed by: Hansa Wagner CRNA  Authorized by: Bismark Mitchell MD     Intubation:     Induction:  Inhalational - mask    Intubated:  Postinduction    Mask Ventilation:  Easy mask    Attempts:  1    Attempted By:  CRNA    Method of Intubation:  Video laryngoscopy    Blade:  Louisa 3    Laryngeal View Grade: Grade IIA - cords partially seen      Difficult Airway Encountered?: No      Complications:  None    Airway Device:  Oral endotracheal tube    Airway Device Size:  7.0    Style/Cuff Inflation:  Cuffed    Inflation Amount (mL):  5    Tube secured:  22    Secured at:  The lips    Placement Verified By:  Capnometry    Complicating Factors:  None

## 2020-04-03 NOTE — DISCHARGE INSTRUCTIONS
Anesthesia: After Your Surgery  Youve just had surgery. During surgery, you received medication called anesthesia to keep you comfortable and pain-free. After surgery, you may experience some pain or nausea. This is common. Here are some tips for feeling better and recovering after surgery.    Going home  Your doctor or nurse will show you how to take care of yourself when you go home. He or she will also answer your questions. Have an adult family member or friend drive you home. For the first 24 hours after your surgery:  · Do not drive or use heavy equipment.  · Do not make important decisions or sign legal documents.  · Avoid alcohol.  · Have someone stay with you, if needed. He or she can watch for problems and help keep you safe.  · Take your time getting up from a seated or lying position. You may experience dizziness for 24 hours  Be sure to keep all follow-up appointments with your doctor. And rest after your procedure for as long as your doctor tells you to.    Coping with pain  If you have pain after surgery, pain medication will help you feel better. Take it as directed, before pain becomes severe. Also, ask your doctor or pharmacist about other ways to control pain, such as with heat, ice, and relaxation. And follow any other instructions your surgeon or nurse gives you.    URINARY RETENTION  Should you experience a decrease in your urine output or are unable to urinate following surgery, this can be due to the medications given during surgery.  We recommend you going to the nearest Emergency Department.    Tips for taking pain medication  To get the best relief possible, remember these points:  · Pain medications can upset your stomach. Taking them with a little food may help.  · Most pain relievers taken by mouth need at least 20 to 30 minutes to take effect.  · Taking medication on a schedule can help you remember to take it. Try to time your medication so that you can take it before beginning an  activity, such as dressing, walking, or sitting down for dinner.  · Constipation is a common side effect of pain medications. Contact your doctor before taking any medications like laxatives or stool softeners to help relieve constipation. Also ask about any dietary restrictions, because drinking lots of fluids and eating foods like fruits and vegetables that are high in fiber can also help. Remember, dont take laxatives unless your surgeon has prescribed them.  · Mixing alcohol and pain medication can cause dizziness and slow your breathing. It can even be fatal. Dont drink alcohol while taking pain medication.  · Pain medication can slow your reflexes. Dont drive or operate machinery while taking pain medication.  If your health care provider tells you to take acetaminophen to help relieve your pain, ask him or her how much you are supposed to take each day. (Acetaminophen is the generic name for Tylenol and other brand-name pain relievers.) Acetaminophen or other pain relievers may interact with your prescription medicines or other over-the-counter (OTC) drugs. Some prescription medications contain acetaminophen along with other active ingredients. Using both prescription and OTC acetaminophen for pain can cause you to overdose. The FDA recommends that you read the labels on your OTC medications carefully. This will help you to clearly understand the list of active ingredients, dosing instructions, and any warnings. It may also help you avoid taking too much acetaminophen. If you have questions or don't understand the information, ask your pharmacist or health care provider to explain it to you before you take the OTC medication.    Managing nausea  Some people have an upset stomach after surgery. This is often due to anesthesia, pain, pain medications, or the stress of surgery. The following tips will help you manage nausea and get good nutrition as you recover. If you were on a special diet before surgery,  ask your doctor if you should follow it during recovery. These tips may help:  · Dont push yourself to eat. Your body will tell you when to eat and how much.  · Start off with clear liquids and soup. They are easier to digest.  · Progress to semi-solid foods (mashed potatoes, applesauce, and gelatin) as you feel ready.  · Slowly move to solid foods. Dont eat fatty, rich, or spicy foods at first.  · Dont force yourself to have three large meals a day. Instead, eat smaller amounts more often.  · Take pain medications with a small amount of solid food, such as crackers or toast to avoid nausea.      Call your surgeon if    · You feel too sleepy, dizzy, or groggy (medication may be too strong).  · You have side effects like nausea, vomiting, or skin changes (rash, itching, or hives).   © 8812-0204 [employer]. [address] [place] [address]. All rights reserved. This information is not intended as a substitute for professional medical care. Always follow your healthcare professional's instructions.                POSTOPERATIVE INSTRUCTIONS FOLLOWING   MASTECTOMY AND/OR AXILLARY LYMPH NODE DISSECTION    The following are post-operative instructions that will help you to recover from your surgery.  Please read over these instructions carefully and contact us if we can answer any of your questions or concerns.    Dressing/breast binder (surgi-bra)  A surgical bra may be placed around your chest after your surgery.  If you are given the bra, please wear it as close to 24 hours a day as possible until your post-operative clinic appointment.  If the elastic around the bra irritates your skin, you may wear a soft t-shirt underneath the bra.    You may go without wearing the bra long enough to bath, to launder and dry the bra. If you have fluffy filler placed inside the bra, the filler should be removed whenever the bra is taken off. Please reinsert the fluffy filler, or insert the new soft filler,  under the bra when you put the bra back on.  If the bra is extremely uncomfortable, you may wear a supportive sports bra instead after 2 days.    You may shower immediately.  Do not take a tub bath and do not soak the surgical site. Please do not remove the dermabond that cover your incision.     Activity    You will be able to do much of your own personal care, such as bathing, dressing, preparing simple meals, etc.   A short walk each day will help with your recovery   You may find that you need to take rest breaks between activities, but you should not need to stay in bed for prolonged periods of time during the day   You may resume light household activities such as simple meal preparation, folding laundry, using your computer, and completing paperwork as you feel ready   Please avoid activities that require moderate to heavy lifting (grocery shopping) or pushing/pulling (vacuuming) and repetitive motions (such as washing windows or long hours on the computer). Do not lift anything heavier than a gallon of milk.   A good rule during this time is to listen to your body, do what is comfortable, and stop and rest when your feel tired.  If it hurts, dont do it.   Following a lymph node dissection, dont avoid using your arm, but dont exercise your arm until after your first post-operative visit.  At your first post-op visit, you will be given arm exercises to regain movement and flexibility.  You may be referred to physical therapy.   You may restart driving when you are no longer on narcotics, your drain has been removed, and you feel safe turning the wheel and stopping quickly.   You will need to be out of work approximately 2-6 weeks depending on your particular surgery and how well you are recovering.  We will evaluate how you are doing at the first post-op appointment.  This is a good time to ask when you may return to work and what activities you may do.    Medication for pain   You will be given a  prescription for pain medication. You should not drive or operate machinery while taking these.  Please take narcotics with food.  Narcotics can cause, or worse, constipation.  You will need to increase your fluid intake, eat high fiber foods (such as fruits and bran) and make sure that you are up and walking. You may need to take an over the counter stool softener for constipation.   An icepack may be helpful to decrease discomfort and swelling, particularly to the armpit after a lymph node dissection.   A small pillow positioned in the armpit may also decrease discomfort after a lymph node dissection.   If you are given a prescription for antibiotics, please continue to take them until the drains have been removed.    How to care for your Drain(s)  1. Wash hands--STRIP or milk the drainage tube as it comes out of your body toward the bulb.   a. Beginning where the drain comes out of your body, hold drainage tubing with one hand and with the other, stretch and release tubing an inch at time while moving downward with both hands toward the bulb.  b. Do this 2-3 times before emptying the bulb.  2. Remove the stopper from the bulbs port  (drainage port)  3. Pour the drainage in the measuring cup provided by the nurse  4. Flatten/squeeze the bulb to create a vacuum and replace the stopper before letting go the of the bulb.  5. Record the date, time and amount of drainage in ccs (not ounces) each time bulb is emptied. If you have more than one drain, record each separately.  6. Discard the drainage into the toilet after measuring and then wash hands.  7. Empty bulbs 3 times/day or as needed if it fills up before 8 hours.  8. Remember to bring the output record with you to your doctors appointment.    Please report the following:   Temperature greater than 101 degrees   Discharge or bad odor from the wound   Excessive bleeding, such as bloody dressing or extreme bruising   Redness at incision and/or drain  sites   Swelling or buildup of fluid around incision  If blue dye was used to locate your sentinel lymph nodes, your urine and stool may be blue-green in color for 1 or 2 days.    Additional information  I will see you approximately 2 weeks following your surgery.  If this follow-up appointment has not been made, please call the office.    If you have any questions or problems, please call my office or my nurse.    Dr. Mireille Wetzel, RN  [phone]    After hours and on weekends, you may call the main [facility] line at [phone] and ask to have the general surgery resident paged or have me paged      Lymphedema Risk Reduction    Lymphedema is a swelling of a part of the body, caused by an insufficient lymphatic system and an accumulation of fluid in the bodys tissues.  Lymphedema may occur when normal drainage of fluid is disrupted, such as an infection, injury, cancer, scar tissue, or removal of lymph nodes.    If you had a full axillary node dissection procedure, you may be at great risk for lymphedema.     For those patients having a sentinel lymph node biopsy, these risks may be smaller and the recommendations are provided for your review and consideration.    The following list contains recommendations for reducing your risk of developing lymphedema.    I. Skin Care--avoid trauma/injury to reduce infection risk   Keep the hand and arm on the side of surgery clean and dry   Pay attention to nail care and do not cut cuticles   Avoid punctures, such as injections and blood draws from you on the side of your surgery   Wear gloves while doing activities that may cause skin injury (washing dishes, gardening, etc.)   If scratches or punctures occur, wash area with soap and water, and observe for signs of infections (redness, drainage, swelling)   If a rash, itching, redness, pain, increased skin temperatures, fever, or flu-like symptoms occur, contact your physician immediately for early  treatment of a possible infection  II. Activity/Lifestyle   Gradually build up the duration and intensity of any activity or exercise   Take frequent rest periods during activity to allow for arm recovery   Monitor your arm and upper body during and after activity for any change in size, shape, tissue, texture, soreness, heaviness, or firmness  III. Avoid constriction of your arm on the side of your surgery   Avoid having blood pressure take on the arm on the side of your surgery   Wear loose fitting jewelry and clothing   When you return to wearing a bra, make sure that it is well fitted and not tight                    PLEASE RECORD ALL AMOUNTS IN CCS AND BRIND THIS RECORD   WITH YOU TO YOUR RETURN APPOINTMENT  Date Time Drain #1 Drain #2 comment                                                                                                                                                                                                                         Date Time Drain #1 Drain #2 comment                                                                                                                                                                                                               Your Surgeon Gave You EXPAREL® (bupivacaine liposome injectable suspension)    To help control your pain after surgery, your surgeon injected EXPAREL into your surgical incision just before the end of the procedure.   EXPAREL is a local analgesic that contains the local anesthetic bupivacaine. Local anesthetics provide pain relief by numbing the tissue  around the surgical site.   EXPAREL is specifically designed to release pain medication over time and can control pain for up to 72 hours.   In addition to EXPAREL, your surgeon may provide other pain medications to control your pain.   Each patient is different and responds differently to painmedication. Depending on how you respond to EXPAREL, you may require  less  additional pain medication during your recovery.    Possible Side Effects    Side effects can occur with any medication and it is important not to ignore anything you may be experiencing. Some patients who  received EXPAREL experienced nausea, vomiting, or constipation. Rarely, patients who receive bupivacaine (the active ingredient in  EXPAREL) have experienced numbness and tingling in their mouth or lips, lightheadedness, or anxiety. Speak with your doctor right  away if you think you may be experiencing any of these sensations, or if you have other questions regarding possible side effects.    Your Recovery    When your pain is under control, your body can better focus on healing. This is not the time to test your pain tolerance, or grin and  bear it.Work with your surgeon and nurse to make your recovery as speedy and pain-free as possible.   Follow the post-op orders your nurse gave you.   Eat a healthy diet and drink plenty of water. Surgery stresses your body; your body responds by needing more energy to heal.      Important Information About EXPAREL  Products that contain bupivacaine, like EXPAREL, may cause a temporary loss of  sensation or the ability to move in the area where bupivacaine was injected.    Date Administered: 4/3/2020  Time Administered: 9:05am    Other Forms of Bupivicaine should not be administered within 96hrs following administration of EXPAREL.         Caring for a Closed Suction Drainage Tube  A drainage tube removes fluid from around an incision. This helps prevent infection and promotes healing. The collection bulb at the end of the tube is squeezed and plugged to create suction. The bulb should be emptied and reset when half full to maintain adequate suction. You need to empty the bulb and clean the skin around the drain as often as your healthcare provider tells you to. Follow the steps below.     What youll need  Have the following items ready:  · Disposable  "gloves  · Measuring cup  · Record sheet  · Gauze or paper towel  · Sterile cotton swabs or 4" x 4" gauze pads  · Sterile saline or soap and water       Step 1. Empty the bulb  · Wash your hands and put on a new pair of disposable gloves.  · Point the top of the bulb away from you and remove the stopper.  · Turn the bulb upside down over a measuring cup. Squeeze the fluid into the cup. Make sure the bulb is totally empty.  · Put the cup to one side. You can record the volume of liquid in the cup after you clean and reconnect the bulb in step 2.    Step 2. Clean and reconnect the bulb  · Clean the top of the bulb with clean gauze or a paper towel, if needed.  · Squeeze the bulb tight, and put the stopper back on the top.  · Record the amount of fluid in the cup. Then, empty the cup as directed.    Step 3. Clean the site  · Remove your disposable gloves and wash your hands before cleaning the site.  · Put on a new pair of disposable gloves.  · Wet a sterile cotton swab or 4" x 4" gauze pad with sterile saline or soap and water.  · Gently clean the skin around the drain. Always wipe away from the incision.  · Apply an antibacterial ointment if directed.   When to call your healthcare provider  Call your healthcare provider if you notice any of these changes:  · The amount of fluid increases or decreases suddenly  · Large amount of blood or a clot in drainage  · Color, odor, or thickness of the fluid changes  · Tube falls out or the incision opens  · Skin around the drain is red, swollen, painful, or seeping pus  · You have a fever of 100.4°F (38°C) or higher, or as directed by your healthcare provider     If the tube isn't draining  Here are tips to drain the tube:  · Uncurl any kinks in the tube.  · With one hand, firmly hold the base of the tube between your thumb and index finger. Do not touch the incision.  · Put the thumb and index finger of your other hand on the tube, next to the first hand. Pinch your fingers " "together. Then pull them along the tube toward the bag. This will help push any clogged fluid through the tube. This is called "stripping the tube." You may find it helpful to hold an alcohol swab between your fingers and the tube to lubricate the tubing.  · If the tube still does not drain, call your healthcare provider.   Date Last Reviewed: 12/1/2016  © 5976-2748 [employer]. [address] [place] [address], [place] [address]. All rights reserved. This information is not intended as a substitute for professional medical care. Always follow your healthcare professional's instructions.        "

## 2020-04-03 NOTE — ANESTHESIA POSTPROCEDURE EVALUATION
Anesthesia Post Evaluation    Patient: Briseyda Rojas    Procedure(s) Performed: Procedure(s) (LRB):  MASTECTOMY, MODIFIED RADICAL (Right)    Final Anesthesia Type: general    Patient location during evaluation: PACU  Patient participation: Yes- Able to Participate  Level of consciousness: awake and alert  Post-procedure vital signs: reviewed and stable  Pain management: adequate  Airway patency: patent    PONV status at discharge: No PONV  Anesthetic complications: no      Cardiovascular status: blood pressure returned to baseline  Respiratory status: unassisted, spontaneous ventilation and room air  Hydration status: euvolemic  Follow-up not needed.          Vitals Value Taken Time   /69 4/3/2020 10:11 AM   Temp 36.8 °C (98.2 °F) 4/3/2020  9:38 AM   Pulse 66 4/3/2020 10:21 AM   Resp 16 4/3/2020 10:10 AM   SpO2 98 % 4/3/2020 10:21 AM   Vitals shown include unvalidated device data.      No case tracking events are documented in the log.      Pain/Meenu Score: Pain Rating Prior to Med Admin: 8 (4/3/2020 10:20 AM)  Meenu Score: 10 (4/3/2020 10:10 AM)

## 2020-04-06 ENCOUNTER — HOSPITAL ENCOUNTER (OUTPATIENT)
Dept: RADIOLOGY | Facility: OTHER | Age: 65
Discharge: HOME | End: 2020-04-06
Attending: PATHOLOGY
Payer: MEDICAID

## 2020-04-06 ENCOUNTER — TELEPHONE (OUTPATIENT)
Dept: SURGERY | Facility: CLINIC | Age: 65
End: 2020-04-06

## 2020-04-06 DIAGNOSIS — Z85.3 HISTORY OF BREAST CANCER: ICD-10-CM

## 2020-04-06 PROCEDURE — 76098 X-RAY EXAM SURGICAL SPECIMEN: CPT | Mod: 26,,, | Performed by: RADIOLOGY

## 2020-04-06 PROCEDURE — 76098 X-RAY EXAM SURGICAL SPECIMEN: CPT | Mod: TC

## 2020-04-06 PROCEDURE — 76098 MAMMO BREAST SPECIMEN: ICD-10-PCS | Mod: 26,,, | Performed by: RADIOLOGY

## 2020-04-06 NOTE — TELEPHONE ENCOUNTER
"Spoke to pt and her Daughter in law, Bi.  I informed them that we put in referrals to multiple Home Health Agencies and at this time none were able to take her on as a patient.  Her daughter in law is a former CNA and is able empty her drain.  I reviewed instructions on how to strip the drain and empty the bulb with the daughter in law.  They would both prefer if we can get a home health nurse as the daughter in law feels "Mrs. Rojas doesn't want her to be the one handling her drain."  She felt she didn't know what she was doing.  I talked to Mrs. Rojas and told her that I reviewed drain care with her daughter in law.  At this point she was ok with the daughter in law emptying the drain.  They had no further concerns at this time. Were instructed to call with any further concerns.  "

## 2020-04-07 ENCOUNTER — HOSPITAL ENCOUNTER (EMERGENCY)
Facility: HOSPITAL | Age: 65
Discharge: HOME | End: 2020-04-07
Attending: EMERGENCY MEDICINE
Payer: MEDICAID

## 2020-04-07 ENCOUNTER — TELEPHONE (OUTPATIENT)
Dept: SURGERY | Facility: CLINIC | Age: 65
End: 2020-04-07

## 2020-04-07 VITALS
TEMPERATURE: 100 F | BODY MASS INDEX: 17.19 KG/M2 | SYSTOLIC BLOOD PRESSURE: 101 MMHG | OXYGEN SATURATION: 100 % | RESPIRATION RATE: 21 BRPM | WEIGHT: 106.5 LBS | DIASTOLIC BLOOD PRESSURE: 67 MMHG | HEART RATE: 99 BPM

## 2020-04-07 DIAGNOSIS — S16.1XXA STRAIN OF NECK MUSCLE, INITIAL ENCOUNTER: Primary | ICD-10-CM

## 2020-04-07 LAB
ALBUMIN SERPL BCP-MCNC: 4 G/DL (ref 3.5–5.2)
ALP SERPL-CCNC: 65 U/L (ref 38–126)
ALT SERPL W/O P-5'-P-CCNC: 12 U/L (ref 10–44)
ANION GAP SERPL CALC-SCNC: 8 MMOL/L (ref 8–16)
AST SERPL-CCNC: 19 U/L (ref 15–46)
BASOPHILS # BLD AUTO: 0.07 K/UL (ref 0–0.2)
BASOPHILS NFR BLD: 0.9 % (ref 0–1.9)
BILIRUB SERPL-MCNC: 0.3 MG/DL (ref 0.1–1)
BUN SERPL-MCNC: 16 MG/DL (ref 7–17)
CALCIUM SERPL-MCNC: 9.7 MG/DL (ref 8.7–10.5)
CHLORIDE SERPL-SCNC: 98 MMOL/L (ref 95–110)
CO2 SERPL-SCNC: 28 MMOL/L (ref 23–29)
CREAT SERPL-MCNC: 1.02 MG/DL (ref 0.5–1.4)
DIFFERENTIAL METHOD: ABNORMAL
EOSINOPHIL # BLD AUTO: 0.3 K/UL (ref 0–0.5)
EOSINOPHIL NFR BLD: 4.1 % (ref 0–8)
ERYTHROCYTE [DISTWIDTH] IN BLOOD BY AUTOMATED COUNT: 13.7 % (ref 11.5–14.5)
EST. GFR  (AFRICAN AMERICAN): >60 ML/MIN/1.73 M^2
EST. GFR  (NON AFRICAN AMERICAN): 58.3 ML/MIN/1.73 M^2
GLUCOSE SERPL-MCNC: 107 MG/DL (ref 70–110)
HCT VFR BLD AUTO: 35.8 % (ref 37–48.5)
HGB BLD-MCNC: 11 G/DL (ref 12–16)
IMM GRANULOCYTES # BLD AUTO: 0.02 K/UL (ref 0–0.04)
IMM GRANULOCYTES NFR BLD AUTO: 0.3 % (ref 0–0.5)
LYMPHOCYTES # BLD AUTO: 1.5 K/UL (ref 1–4.8)
LYMPHOCYTES NFR BLD: 18.3 % (ref 18–48)
MCH RBC QN AUTO: 29.1 PG (ref 27–31)
MCHC RBC AUTO-ENTMCNC: 30.7 G/DL (ref 32–36)
MCV RBC AUTO: 95 FL (ref 82–98)
MONOCYTES # BLD AUTO: 1 K/UL (ref 0.3–1)
MONOCYTES NFR BLD: 12 % (ref 4–15)
NEUTROPHILS # BLD AUTO: 5.2 K/UL (ref 1.8–7.7)
NEUTROPHILS NFR BLD: 64.4 % (ref 38–73)
NRBC BLD-RTO: 0 /100 WBC
PLATELET # BLD AUTO: 222 K/UL (ref 150–350)
PMV BLD AUTO: 10.1 FL (ref 9.2–12.9)
POTASSIUM SERPL-SCNC: 4.3 MMOL/L (ref 3.5–5.1)
PROT SERPL-MCNC: 7.6 G/DL (ref 6–8.4)
RBC # BLD AUTO: 3.78 M/UL (ref 4–5.4)
SODIUM SERPL-SCNC: 134 MMOL/L (ref 136–145)
WBC # BLD AUTO: 7.99 K/UL (ref 3.9–12.7)

## 2020-04-07 PROCEDURE — 99284 EMERGENCY DEPT VISIT MOD MDM: CPT | Mod: 25,ER

## 2020-04-07 PROCEDURE — 87040 BLOOD CULTURE FOR BACTERIA: CPT | Mod: ER

## 2020-04-07 PROCEDURE — 96374 THER/PROPH/DIAG INJ IV PUSH: CPT | Mod: ER

## 2020-04-07 PROCEDURE — 25000003 PHARM REV CODE 250: Mod: ER | Performed by: NURSE PRACTITIONER

## 2020-04-07 PROCEDURE — 63600175 PHARM REV CODE 636 W HCPCS: Mod: ER | Performed by: NURSE PRACTITIONER

## 2020-04-07 PROCEDURE — 80053 COMPREHEN METABOLIC PANEL: CPT | Mod: ER

## 2020-04-07 PROCEDURE — 85025 COMPLETE CBC W/AUTO DIFF WBC: CPT | Mod: ER

## 2020-04-07 RX ORDER — METHOCARBAMOL 750 MG/1
750 TABLET, FILM COATED ORAL 3 TIMES DAILY PRN
Qty: 30 TABLET | Refills: 0 | Status: SHIPPED | OUTPATIENT
Start: 2020-04-07 | End: 2020-04-17

## 2020-04-07 RX ORDER — DIAZEPAM 10 MG/2ML
2 INJECTION INTRAMUSCULAR
Status: COMPLETED | OUTPATIENT
Start: 2020-04-07 | End: 2020-04-07

## 2020-04-07 RX ORDER — ACETAMINOPHEN 500 MG
1000 TABLET ORAL
Status: COMPLETED | OUTPATIENT
Start: 2020-04-07 | End: 2020-04-07

## 2020-04-07 RX ADMIN — DIAZEPAM 2 MG: 5 INJECTION, SOLUTION INTRAMUSCULAR; INTRAVENOUS at 06:04

## 2020-04-07 RX ADMIN — ACETAMINOPHEN 1000 MG: 500 TABLET, FILM COATED ORAL at 06:04

## 2020-04-07 NOTE — ED PROVIDER NOTES
Encounter Date: 4/7/2020       History     Chief Complaint   Patient presents with    Vascular Access Problem     Pt family reports pt is having swelling and pain to her port after having a surgery for tumor removal Friday in which she discontinued her Eliquis prior. Pt provider, Dr. Raya, informed pt to come to get evaluated for a blood clot.      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  Patient's daughter-in-law states pain has been present since patient had surgery on Friday but worsened this morning.  Patient's daughter-in-law states she has given her her scheduled Norco with no relief of symptoms.  Patient also did not restart her Eliquis since surgery on Friday.  Patient's daughter log did give her 2 pills prior to coming to the ED today.  Family denies any fever, chills, cough or congestion.  No pain, redness or erythema port site noted.    The history is provided by the patient, medical records and a caregiver.     Review of patient's allergies indicates:  No Known Allergies  Past Medical History:   Diagnosis Date    Anemia     Anticoagulant long-term use     Breast cancer     History of chemotherapy     Hypertension     Port-A-Cath in place     Thrombus     around port     Past Surgical History:   Procedure Laterality Date    HYSTERECTOMY      INSERTION OF VENOUS ACCESS PORT Left 10/30/2019    Procedure: INSERTION, VENOUS ACCESS PORT;  Surgeon: Kraig Kahn Jr., MD;  [facility]: [facility];  Service: General;  Laterality: Left;  Left internal jugular Port-A-Cath Placement    MODIFIED RADICAL MASTECTOMY W/ AXILLARY LYMPH NODE DISSECTION Right 4/3/2020    Procedure: MASTECTOMY, MODIFIED RADICAL;  Surgeon: Mireille Raya MD;  Location: [facility] OR;  Service: General;  Laterality: Right;    WRIST FRACTURE SURGERY Bilateral      Family History   Problem  Relation Age of Onset    Ovarian cancer Mother     Breast cancer Maternal Grandmother      Social History     Tobacco Use    Smoking status: Current Every Day Smoker     Packs/day: 0.50     Years: 46.00     Pack years: 23.00    Smokeless tobacco: Current User    Tobacco comment: smoking few cig per day now, smoking cessation packet  given & discussed   Substance Use Topics    Alcohol use: No    Drug use: No     Review of Systems   Constitutional: Negative for activity change, chills and fever.   HENT: Negative for congestion.    Respiratory: Negative for cough, chest tightness, shortness of breath and wheezing.    Cardiovascular: Negative for chest pain.   Gastrointestinal: Negative for abdominal pain, constipation, nausea and vomiting.   Musculoskeletal: Positive for neck pain ( Left-sided). Negative for arthralgias, myalgias and neck stiffness.   Allergic/Immunologic: Positive for immunocompromised state.   Neurological: Negative for dizziness, syncope, weakness, numbness and headaches.   All other systems reviewed and are negative.      Physical Exam     Initial Vitals [04/07/20 1827]   BP Pulse Resp Temp SpO2   110/69 88 (!) 22 99.7 °F (37.6 °C) 96 %      MAP       --         Physical Exam    Nursing note and vitals reviewed.  Constitutional: Vital signs are normal. She appears well-developed and well-nourished. She is cooperative. No distress.   HENT:   Head: Normocephalic and atraumatic.   Mouth/Throat: Uvula is midline, oropharynx is clear and moist and mucous membranes are normal.   Eyes: Conjunctivae, EOM and lids are normal. Pupils are equal, round, and reactive to light.   Neck: Trachea normal and phonation normal. Neck supple. Muscular tenderness present. No spinous process tenderness present. Decreased range of motion ( due to pain) present. No erythema present. No neck rigidity.       Increased neck pain with turning head to the left.   Cardiovascular: Normal rate, regular rhythm and intact  distal pulses.   Pulses:       Radial pulses are 2+ on the right side, and 2+ on the left side. There is a central line which is a Yoli-Cath in the left subclavian.   No redness, warmth or erythema to port site.    Pulmonary/Chest: Effort normal and breath sounds normal.   Abdominal: Normal appearance.   Neurological: She is alert and oriented to person, place, and time. She has normal strength. No sensory deficit. GCS eye subscore is 4. GCS verbal subscore is 5. GCS motor subscore is 6.   Skin: Skin is warm, dry and intact. Capillary refill takes 2 to 3 seconds. No pallor.         ED Course   Procedures  Labs Reviewed   CBC W/ AUTO DIFFERENTIAL - Abnormal; Notable for the following components:       Result Value    RBC 3.78 (*)     Hemoglobin 11.0 (*)     Hematocrit 35.8 (*)     Mean Corpuscular Hemoglobin Conc 30.7 (*)     All other components within normal limits   COMPREHENSIVE METABOLIC PANEL - Abnormal; Notable for the following components:    Sodium 134 (*)     eGFR if non  58.3 (*)     All other components within normal limits   CULTURE, BLOOD   CULTURE, BLOOD          Imaging Results    None          Medical Decision Making:   Initial Assessment:   Emergent evaluation of a 63 yo female patient presenting to the ER with chief complaint of left-sided neck pain since surgery on Friday.  Patient does have a left subclavian port so family is concerned for clot.  Patient did not restart her blood thinner after surgery.  On exam patient is A&O x3.  Patient is slightly febrile but nontoxic appearing.  Left-sided neck pain to palpation noted.  Increased pain with turning head to the left.  No redness, erythema or drainage noted.  Patient does have a left Port-A-Cath.  No tenderness to palpation, erythema, redness or warmth appreciated.  Breath sounds clear bilaterally.  Cap refill less than 3 sec. Strength 5/5 in bilateral upper extremities.  No upper extremity edema noted.  Differential Diagnosis:    Differential diagnoses include but are not limited to thrombosis, port complication, musculoskeletal pain.  Clinical Tests:   Lab Tests: Ordered and Reviewed  The following lab test(s) were unremarkable: CBC and CMP       <> Summary of Lab: Patient's CBC unremarkable.  No leukocytosis noted.  H&H stable 11 and 35.8.  CMP unremarkable.  ED Management:  I will get labs due to slight fever, medicate and reassess.  I discussed this case with my supervising physician.      Patient reassessed after medications.  Patient states feeling better.  Advised will discharge home with Robaxin for muscle strain.  Ice or heat for comfort.  Maintain movement and stretches.  Follow-up with surgeon and PCP as needed.  Patient verbalized understanding of this plan of care.  All questions and concerns addressed.    Patient is hemodynamically stable, vital signs are normal. Discharge instructions given. Return to ED precautions discussed. Follow up as directed. Pt verbalized understanding of this plan.  Pt is stable for discharge.                                  Clinical Impression:       ICD-10-CM ICD-9-CM   1. Strain of neck muscle, initial encounter S16.1XXA 847.0         Disposition:   Disposition: Discharged  Condition: Stable     ED Disposition Condition    Discharge Stable        ED Prescriptions     Medication Sig Dispense Start Date End Date Auth. Provider    methocarbamoL (ROBAXIN) 750 MG Tab Take 1 tablet (750 mg total) by mouth 3 (three) times daily as needed (neck strain). 30 tablet 4/7/2020 4/17/2020 Kim Remy NP        Follow-up Information     Follow up With Specialties Details Why Contact Info    Merle Cunningham NP Family Medicine Schedule an appointment as soon as possible for a visit  As needed [address] [facility] [address] [phone]      Mireille Raya MD Surgery, Breast Surgery Go to  scheduled appointment on 4/16 [address] [facility]  [place] [facility]  [address]  [phone]                                       Kim Remy, CHRISTIAN  04/07/20 1918

## 2020-04-07 NOTE — TELEPHONE ENCOUNTER
Spoke to Janneth pt daughter in law. She reported that Mrs. Rojas has been complaining of pain in her neck which has become severe.  Pt is taking the Norco pain medicine as scheduled but still having severe pain.  The daughter in law reports that her pain is coming from around the area of her port.  She reports that the port looks as though it is bulging through her skin and that the area is warm to touch.  Mrs. Rojas was diagnosed with a clot in her port via CT scan a couple weeks back.  I informed the daughter in law to take Mrs. Rojas to an [facility] ER as they will have access to the CT scan.  Daughter in law also reports that the patient has not started retaking the blood thinner medication that was to be restarted on post op day 2.  Instructed to start taking blood thinner at this time.

## 2020-04-07 NOTE — TELEPHONE ENCOUNTER
----- Message from Ekta Wetzel RN sent at 4/7/2020  4:59 PM CDT -----  Contact: Janneth Kirkpatrick Daughter-in-law [phone]      ----- Message -----  From: Kaylyn Fuchs  Sent: 4/7/2020   2:59 PM CDT  To: El[facility]ERMAIN Staff    Patient is complaining of pain on left side of her neck.  Taking Norco and it is not working.  Wanting to know if she can take a Tylenol or a Motrin.  Pain started in the last two days.  Please call Jannteh little Daughter-In-Law @ [phone].

## 2020-04-08 ENCOUNTER — TELEPHONE (OUTPATIENT)
Dept: HEMATOLOGY/ONCOLOGY | Facility: CLINIC | Age: 65
End: 2020-04-08

## 2020-04-08 ENCOUNTER — DOCUMENTATION ONLY (OUTPATIENT)
Dept: HEMATOLOGY/ONCOLOGY | Facility: CLINIC | Age: 65
End: 2020-04-08

## 2020-04-08 DIAGNOSIS — Z17.1 MALIGNANT NEOPLASM OF RIGHT BREAST IN FEMALE, ESTROGEN RECEPTOR NEGATIVE, UNSPECIFIED SITE OF BREAST: Primary | ICD-10-CM

## 2020-04-08 DIAGNOSIS — C50.911 MALIGNANT NEOPLASM OF RIGHT BREAST IN FEMALE, ESTROGEN RECEPTOR NEGATIVE, UNSPECIFIED SITE OF BREAST: Primary | ICD-10-CM

## 2020-04-08 LAB
FINAL PATHOLOGIC DIAGNOSIS: NORMAL
GROSS: NORMAL

## 2020-04-08 NOTE — PROGRESS NOTES
PROMISE received in basket msg from provider and his nurse.  Discussion with nurse revealed concern that pt may need help to care for herself at home.  PROMISE called pt who is currently staying with her daughter-in-law, Karen ([phone]) while recovering from breast surgery. Promise obtained permission from pt to discuss her medical care with Karen.  Karen, who is a CNA said pt is not getting pain relief from tylenol and has called surgeon's office to let them know. They were waiting for a return call. Discussed home health and PROMISE explained to Karen that Medicaid doesn't cover HH.  Karen noted that the main concern upon pt's return home is accurate medication administration.  PROMISE suggested a pill box with meds divided by day and time.  Karen will distribute pt's meds into a pill box that holds a month of meds.  She said even though she lives 35 mins away from her in-laws she sees them three or four times per month and calls everyday.  She said her father-in-law gets very irritable and flustered related anxiety about pt's care and the pill box will help with this.  PROMISE explained that pt will be eligible for Medicare on her birthday in Nov and will then be able to get home health.  PROMISE left name and contact information and will check in with pt tomorrow.

## 2020-04-08 NOTE — TELEPHONE ENCOUNTER
Returned call to Karen. She was returning a call to our clinic from a missed call from Rachael.   She explained to me of recent events of needing to report to ED.  Patient recently diagnosed with blood clot in Left IJ where port was and started on Eliquis prior to having breast surgery. Patient developed some swelling and severe pain in neck. Seen in ED yesterday and diagnosed with muscle strain. Muscle relaxer prescribed and patient advised to resume eliquis.   Patient doing better today, though still seems to be having difficulty sleeping and pain in neck.   Would like to update Dr. De Leon on her current situation.   Also this past Saturday patient reported to have near syncopal episode, where EMS was contacted, and patient appeared to be dehydrated.     Concern that patient not able to take care of her self at home. Per daughter-in-law she feels HH would benefit patient, though patient has medicaid and they have been struggling to see if there are any services available to patient. Patient lives at home with spouse, and concerned that they can be confused in managing their care independently.     Explained to Karen, that would contact patient and make sure we have permission to discuss medical history and details with her as she is currently not listed in patient's chart.     Will follow up with both Dr. De Leon and our , Viry to follow her case and see what resources may be available to her.

## 2020-04-08 NOTE — TELEPHONE ENCOUNTER
----- Message from Linda Dacosta sent at 4/8/2020 12:34 PM CDT -----  Contact: Karen (daughter in Law)  Returning a call     Caller name:: Karen     Who Left Message for Patient:: Rachael    Communication preference:: [phone]    Additional info::

## 2020-04-08 NOTE — ED NOTES
Pt brought to exam rm via ambulance with daughter c/o pain to left side of neck and back. Tender upon palpation. States that she had surgery on Friday to remove mass and has been off of her Elequis. Call her PMD and states that he told her he was worried that she could have a clot. Temp off 99.7 noted. ERP and NP at bedside. Orders written and carried out as noted. RN will continue to monitor.

## 2020-04-08 NOTE — DISCHARGE INSTRUCTIONS
Your labs today are unremarkable.  We have prescribed you Robaxin for pain.  You can use ice or heat for comfort.  Maintain movement and stretches.  Start taking your blood thinner as prescribed.  Follow-up with your PCP or surgeon as needed.    Our goal in the emergency department is to always give you outstanding care and exceptional service. You may receive a survey by mail or e-mail in the next week regarding your experience in our ED. We would greatly appreciate your completing and returning the survey. Your feedback provides us with a way to recognize our staff who give very good care and it helps us learn how to improve when your experience was below our aspiration of excellence.

## 2020-04-09 ENCOUNTER — DOCUMENTATION ONLY (OUTPATIENT)
Dept: HEMATOLOGY/ONCOLOGY | Facility: CLINIC | Age: 65
End: 2020-04-09

## 2020-04-09 ENCOUNTER — TELEPHONE (OUTPATIENT)
Dept: SURGERY | Facility: CLINIC | Age: 65
End: 2020-04-09

## 2020-04-09 NOTE — TELEPHONE ENCOUNTER
Spoke to Mrs. Rojas's daughter in law to follow up on how Mrs. Rojas is feeling.  Daughter in law reports that she is feeling much better and that the muscle relaxer has helped tremendously.   She had no further concerns at this time.

## 2020-04-09 NOTE — PROGRESS NOTES
KIMBERLEE called pt.  Karen, daughter-in-law answered phone.  Said pt's pain is much improved and she had a better night and day.  Discussed financial assistance.  She said pt had already received $1000 from patient assistance.  KIMBERLEE will apply for gas cards via [employer].

## 2020-04-12 LAB
BACTERIA BLD CULT: NORMAL
BACTERIA BLD CULT: NORMAL

## 2020-04-13 ENCOUNTER — DOCUMENTATION ONLY (OUTPATIENT)
Dept: HEMATOLOGY/ONCOLOGY | Facility: CLINIC | Age: 65
End: 2020-04-13

## 2020-04-13 NOTE — PROGRESS NOTES
[facility]  Physical Therapy Initial Evaluation    Name: Briseyda Rojas  Clinic Number: [id number]    Therapy Diagnosis:   Encounter Diagnoses   Name Primary?    Malignant neoplasm of right breast in female, estrogen receptor negative, unspecified site of breast     Carcinoma of axillary tail of right breast in female, estrogen receptor negative Yes    At risk for lymphedema     Decreased shoulder mobility, right      Physician: Mireille Raya MD    Physician Orders: PT Eval and Treat   Medical Diagnosis: right breast cancer  Evaluation Date: 4/16/2020  Authorization Period Expiration: 7/16/2020  Plan of Care Certification Period: 5/29/2020  Visit # / Visits authorized: 1/ 1  Insurance: Medicaid/[employer]    Time In: 10:40 AM  Time Out: 11:35 AM  Total Billable Time: 55 minutes    Precautions: cancer      History   History of Present Illness: Briseyda is a 64 y.o. female that presents to  [facility] at the [facility] s/p right breast surgery.   Diagnosis: right breast DCIS and IDC, triple negative, with (+) axillary lymph nodes  Chief complaint: having tightness in RUE and difficulty raising RUE upward  Surgical History: 4/3/20 right mastectomy with ALND  Briseyda Rojas  has a past surgical history that includes Hysterectomy; Wrist fracture surgery (Bilateral); Insertion of venous access port (Left, 10/30/2019); and Modified radical mastectomy w/ axillary lymph node dissection (Right, 4/3/2020).    Chemotherapy: Scott-adjuvant chemotherapy from 11/19 to 2/20  Radiation: pending    Past Medical History:   Diagnosis Date    Anemia     Anticoagulant long-term use     Breast cancer     History of chemotherapy     Hypertension     Port-A-Cath in place     Thrombus     around port          Medications:  Briseyda has a current medication list which includes the following prescription(s): apixaban, aspirin, carvedilol, cyproheptadine, gabapentin,  "hydrocodone-acetaminophen, ibuprofen, lisinopril, megestrol, methocarbamol, ondansetron, and oxycodone.    Allergies:  Review of patient's allergies indicates:  No Known Allergies     Prior Therapy: None  Social History:  lives with their spouse  Occupation: Not working  Prior Level of Function: Independent with all ADL's with RUE  Current Level of Function: difficulty raising RUE upward    Other Past Medical History: see above    Patient's Goals: I wan to regain my motion    Hand dominance: Left handed    Subjective   Pt states: Not having any pain  Pain: 0/10 on VAS currently.   Best: 0/10  Worst: 0/10   Pain location: NA   Objective   Mental status :oriented    Postural examination/scapula alignment: Rounded shoulder and Head forward    Skin Integrity:   Scar Location: right anterior chest  Appearance: healing  Signs of infection: No  Drainage:None  Color:NA    Edema: None  Location: NA    Axillary Web Syndrome/Cording:   Location: right axilla and right medial upper arm  Degree of Cording: Moderate (mild, moderate etc...)   Number of cords present: 3    Sensation: WNL         Range of Motion:      Shoulder Range of Motion:   Active /Passive ROM Right Left   Flexion 130 160   Abduction 95 170   Extension 45 80   IR/90deg 50 85   ER/90deg 40 80          Strength: manual muscle test grades below   Upper Extremity Strength   (R) UE (L) UE   Shoulder flexion: 4/5 5/5   Shoulder Abduction: 4/5 5/5   Shoulder IR 4/5 5/5   Shoulder ER 4/5 5/5   Elbow flexion: 4/5 5/5   Elbow extension: 4/5 5/5   Wrist flexion: 5/5 5/5   Wrist extension: 5/5 5/5    5/5 5/5       Baseline Measurements of BL UE's for early detection of Lymphedema:     LANDMARK RIGHT UE LEFT UE DIFFERENCE   E + 8" 23 cm 24 cm 1 cm   E + 6" 22.5 cm 23 cm 0.5 cm   E + 4" 22.5 cm 23 cm 0.5 cm   E + 2" 21.5 cm 22 cm 0.5 cm   Elbow 22 cm 22 cm 0 cm   W+ 8" 20.5 cm 21.5 cm 1 cm   W +  6" 18 cm 17 cm 1 cm   W + 4" 15 cm 15.5 cm 0.5 cm   Wrist 16 cm 16 cm 0 cm " "  DPC 18.5 cm 19 cm 0.5 cm   IP Thumb 6 cm 6 cm 0 cm     Functional Mobility (Bed mobility, transfers)  Independent    ADL's:  Difficulty raising RUE upward    Gait Assessment:   - AD used: none  - Assistance: independent  - Distance: community distances     Patient Education Provided   - role of therapy in multi - disciplinary team, goals for therapy  - Pt was educated in lymphedema etiology and management plans.    - Pt was provided with written risk reductions and precautions for managing lymphedema.     Pt has no cultural, educational or language barriers to learning provided.  Treatment and Instruction of Home Exercise Program    Time In: 11:10 AM  Time Out: 11:35 AM    Briseyda received individual therapeutic exercises to improve postural correction and alignment, stretching and soft tissue mobility, and strengthening for 25 minutes including the following:   Supine Shld flexion with wand        1 x 10  Butterflies                                        5 x 5"  Side Lying Shld Abd                       1 x 10  Seated Scap Retractions              10 x 5"  Wall Climbs     Forward                 1 x 10  ER on Wall                                    10 x 5"    Written Home Exercises Provided and Patient Education: Handouts given   See EMR under patient instructions for program given  Pt demonstrated good understanding of the education provided. Patient demo good return demo of skill of exercises.    Functional Limitations Reporting      CMS Impairment/Limitation/Restriction for FOTO Outcome Survey    Therapist reviewed FOTO scores for Briseyda Rojas on 4/16/2020.   FOTO documents entered into [employer] - see Media section.    Limitations Score: 32%  Category: Carrying             Assessment   This is a 64 y.o. female referred to outpatient physical therapy and presents with a medical diagnosis of right breast cancer and was seen today post-operatively to establish PT plan of care for impairments following surgery " including: decreased AROM and strength right shoulder and moderate cording right axilla and medial upper arm.     Pt instructed in HEP this session and was able to perform all exercises given independently. Pt instructed to follow up with therapist if any concerns arise with program established. Pt will continue to benefit from skilled physical therapy to address the impairments stated in chart below, provide patient/family education and to maximize pt's level of independence in home and community environment     Anticipated barriers to physical therapy: COVID19 precautions     Pt's spiritual, cultural and educational needs considered and pt agreeable to plan of care and goals as stated below:     Medical necessity is demonstrated by the following IMPAIRMENTS/PROMBLEM LIST:    History  Co-morbidities and personal factors that may impact the plan of care Co-morbidities:   history of cancer, HTN and thrombus around port    Personal Factors:   no deficits     moderate   Examination  Body Structures and Functions, activity limitations and participation restrictions that may impact the plan of care Body Regions:   upper extremities    Body Systems:    ROM  strength  scar formation    Participation Restrictions:   Difficulty raising RUE upward    Activity limitations:   Learning and applying knowledge  no deficits    General Tasks and Commands  no deficits    Communication  no deficits    Mobility  lifting and carrying objects    Self care  dressing    Domestic Life  cooking  doing house work (cleaning house, washing dishes, laundry)    Interactions/Relationships  no deficits    Life Areas  no deficits    Community and Social Life  no deficits         moderate   Clinical Presentation evolving clinical presentation with changing clinical characteristics moderate   Decision Making/ Complexity Score: moderate       Goals: Pt agrees with goals set    Short Term goals: 3 weeks  1. Patient will demonstrate 100% understanding of  lymphedema risk reduction practices to include self monitoring for lymphedema. (progressing, not met)  2. Patient will demonstrate independence with Home Exercise program established. (progressing, not met)  3. Pt will increase AROM/PROM in shoulder abduction ROM to 140 degrees on right to improve functional reach, carry, push, pull pain free. (progressing, not met)  4. Pt will increase AROM/PROM in shoulder flexion to 150 degrees on left to improve functional reach, carry, push, pull pain free.(progressing, not met)  5. Strength will be 4+/5 in RUE in order to perform functional activities. (progressing, not met)    Long Term Goals: 6 weeks   1.  Pt will increase AROM/PROM in shoulder flexion to 170 degrees on right to improve functional reach, carry, push, pull pain free. (progressing, not met)  2. Pt will increase strength to 5/5 in gross UE musculature to improve tolerance to all functional activities pain free. (progressing, not met)  3. Pt will demonstrate full/maximized tissue mobility to increase ROM and promote healthy tissue to be pain free at discharge. (progressing, not met)  4. Pt will report decrease in overall worst pain to 2/10 at discharge. (progressing, not met)  5. Pt will increase AROM/PROM in shoulder abduction ROM to 170 degrees on right to improve functional reach, carry, push, pull pain free. (progressing, not met)  6. Patient will report compliance with walking program 5x week for 10 min each day to improve overall cardiovascular function and decrease cancer related fatigue at discharge. (progressing, not met)      Plan   Outpatient physical therapy 2x week for 6 weeks to include the following:   Manual Therapy, Patient Education and Therapeutic Exercise.    Plan of care Certification Period: 4/16/2020 to 5/29/2020      As we are following the updates regarding COVID19 and taking every precaution to ensure the safety of our patients, staff, and community, and in an abundance of caution and  to reduce the risk and to limit community spread of this virus, I did not reschedule Ms. Rojas for a follow-up visit at this time. I will monitor Ms. Rojas weekly via telephone or telemedicine. She was given my business card with my phone number and email address. Ms. Rojas was instructed to call or email me at any time with any questions or concerns. She was also informed that she can request an in-person visit if she feels she needs to come to the clinic, as she is considered high priority since she recently had surgery.      Therapist: Sandy Carranza, PT  4/16/2020

## 2020-04-14 ENCOUNTER — TELEPHONE (OUTPATIENT)
Dept: SURGERY | Facility: CLINIC | Age: 65
End: 2020-04-14

## 2020-04-14 NOTE — TELEPHONE ENCOUNTER
Spoke to pt. Daughter in law and pt to follow up on Mrs. Rojas's neck pain. The pain has resolved and the patient is doing well.  No further concerns at this time.  Pt has post op appt on 4/16/20.

## 2020-04-16 ENCOUNTER — TELEPHONE (OUTPATIENT)
Dept: SURGERY | Facility: CLINIC | Age: 65
End: 2020-04-16

## 2020-04-16 ENCOUNTER — CLINICAL SUPPORT (OUTPATIENT)
Dept: REHABILITATION | Facility: HOSPITAL | Age: 65
End: 2020-04-16
Payer: MEDICAID

## 2020-04-16 ENCOUNTER — OFFICE VISIT (OUTPATIENT)
Dept: SURGERY | Facility: CLINIC | Age: 65
End: 2020-04-16
Payer: MEDICAID

## 2020-04-16 VITALS
HEART RATE: 65 BPM | DIASTOLIC BLOOD PRESSURE: 65 MMHG | BODY MASS INDEX: 17.12 KG/M2 | WEIGHT: 106.5 LBS | HEIGHT: 66 IN | SYSTOLIC BLOOD PRESSURE: 108 MMHG

## 2020-04-16 DIAGNOSIS — C50.911 MALIGNANT NEOPLASM OF RIGHT BREAST IN FEMALE, ESTROGEN RECEPTOR NEGATIVE, UNSPECIFIED SITE OF BREAST: Primary | ICD-10-CM

## 2020-04-16 DIAGNOSIS — C50.611 CARCINOMA OF AXILLARY TAIL OF RIGHT BREAST IN FEMALE, ESTROGEN RECEPTOR NEGATIVE: Primary | ICD-10-CM

## 2020-04-16 DIAGNOSIS — Z91.89 AT RISK FOR LYMPHEDEMA: ICD-10-CM

## 2020-04-16 DIAGNOSIS — Z17.1 CARCINOMA OF AXILLARY TAIL OF RIGHT BREAST IN FEMALE, ESTROGEN RECEPTOR NEGATIVE: Primary | ICD-10-CM

## 2020-04-16 DIAGNOSIS — M25.611 DECREASED SHOULDER MOBILITY, RIGHT: ICD-10-CM

## 2020-04-16 DIAGNOSIS — C50.911 MALIGNANT NEOPLASM OF RIGHT BREAST IN FEMALE, ESTROGEN RECEPTOR NEGATIVE, UNSPECIFIED SITE OF BREAST: ICD-10-CM

## 2020-04-16 DIAGNOSIS — Z17.1 MALIGNANT NEOPLASM OF RIGHT BREAST IN FEMALE, ESTROGEN RECEPTOR NEGATIVE, UNSPECIFIED SITE OF BREAST: ICD-10-CM

## 2020-04-16 DIAGNOSIS — Z17.1 MALIGNANT NEOPLASM OF RIGHT BREAST IN FEMALE, ESTROGEN RECEPTOR NEGATIVE, UNSPECIFIED SITE OF BREAST: Primary | ICD-10-CM

## 2020-04-16 PROCEDURE — 99024 POSTOP FOLLOW-UP VISIT: CPT | Mod: ,,, | Performed by: SURGERY

## 2020-04-16 PROCEDURE — 99024 PR POST-OP FOLLOW-UP VISIT: ICD-10-PCS | Mod: ,,, | Performed by: SURGERY

## 2020-04-16 PROCEDURE — 97162 PT EVAL MOD COMPLEX 30 MIN: CPT | Performed by: PHYSICAL MEDICINE & REHABILITATION

## 2020-04-16 PROCEDURE — 99213 OFFICE O/P EST LOW 20 MIN: CPT | Mod: PBBFAC,25 | Performed by: SURGERY

## 2020-04-16 PROCEDURE — 99999 PR PBB SHADOW E&M-EST. PATIENT-LVL III: CPT | Mod: PBBFAC,,, | Performed by: SURGERY

## 2020-04-16 PROCEDURE — 99999 PR PBB SHADOW E&M-EST. PATIENT-LVL III: ICD-10-PCS | Mod: PBBFAC,,, | Performed by: SURGERY

## 2020-04-16 NOTE — PATIENT INSTRUCTIONS
POST OP PATIENT EDUCATION          Lymphedema - Identification and Prevention     Lymphedema - is the swelling of a body area or extremity caused by the accumulation of lymphatic fluid.  There is a risk for lymphedema with the removal of lymph nodes, trauma or radiation therapy.  Treatment of breast cancer often involves surgery: mastectomy or lumpectomy. Some of the lymph nodes in the underarm (called axillary lymph nodes) may be removed and checked to see if they contain cancer cells.     During breast surgery when axillary lymph nodes are removed (with sentinel node biopsy or axillary dissection) or are treated with radiation therapy, the lymphatic system may become impaired. This may prevent lymphatic fluid from leaving the area therefore, causing lymphedema.     Lymphatic fluid is a normal part of the circulatory system. Its function is to remove waste products and to produce cells vital to fighting infection. Swelling occurs when the vessels become restricted and the lymphatic fluid is unable to freely flow through them.  If lymphedema is left untreated, the affected limb could progressively become more swollen, which could lead to hardening of the skin, bulkiness in the limb, infection and impaired wound healing.         There are things you can do to decrease the chance of developing lymphedema.                                          [email or web address]                                                                                                                                                  The information presented is intended for general information and educational purposes. It is not intended to replace the  advice of your health care provider. Contact your health care provider if you believe you have a health problem.                                                        Scapular Retraction (Standing)    With arms at sides, squeeze shoulder blades  together. Do not shrug shoulders and do not hold your breath. Hold 5 seconds. Repeat 10 times 1 sessions 2 x day.        .              ROM: Flexion - Wand (Supine)        Lie on back holding wand. Raise arms over head.   Repeat 5-10____ times per set. Do __1__ sets per session. Do _2___ sessions per day.  Copyright © I. All rights reserved.           Butterflies    Lie on your back with your hands behind your head. Open your chest by  your elbows apart and gently down. Hold for 5 seconds. Then, bring  your elbows back together. Repeat 2x daily  5-10 reps   Copyright © 8004-7159 [employer]      Sidelying Shoulder Abduction            Sidelying Shoulder Abduction    Lie on your right/left side with right/left arm on top. Keep your elbow straight. Lift your arm upward toward your head.  Perform _5-10___ times. Perform __1__sets.   Perform __2__ times per day.  Copyright © 4052-0915 [employer]ROM:     Wall Climb    Perform this exercise  (2) times a day with 5-10) repetitions.    Stand and FACE THE WALL with your toes 10 to 12 inches away from the wall.    A.Holding a towel roll in both hands on the wall and slowly slide up the wall as high as possible.  b.Try to go a little higher each time. It may relax you a bit if you rest your head against the wall.    Shoulder External Rotation - on Wall    Right  hand on wall, rotate shoulder by stepping outward to the left.  Hold 5 seconds - repeat 5-10x.  Do 2 sessions per day.      Contract / Relax: External Rotation    Place right arm, elbow bent, beside head on pillow. Use 1-3 pillows to be comfortable.  REST AND RELAX. Hold 30 seconds. Repeat 5 x.  Do 1-2 times per day.

## 2020-04-16 NOTE — TELEPHONE ENCOUNTER
Spoke to patient daughter in law to let her know that there is not any radiation facilities closer to her home than [facility] [address] [facility].  Patient had requested if possible to have radiation closer to home in [place].

## 2020-04-16 NOTE — PLAN OF CARE
[facility]  Physical Therapy Initial Evaluation    Name: Briseyda Rojas  Clinic Number: [id number]    Therapy Diagnosis:   Encounter Diagnoses   Name Primary?    Malignant neoplasm of right breast in female, estrogen receptor negative, unspecified site of breast     Carcinoma of axillary tail of right breast in female, estrogen receptor negative Yes    At risk for lymphedema     Decreased shoulder mobility, right      Physician: Mireille Raya MD    Physician Orders: PT Eval and Treat   Medical Diagnosis: right breast cancer  Evaluation Date: 4/16/2020  Authorization Period Expiration: 7/16/2020  Plan of Care Certification Period: 5/29/2020  Visit # / Visits authorized: 1/ 1  Insurance: Medicaid/[employer]    Time In: 10:40 AM  Time Out: 11:35 AM  Total Billable Time: 55 minutes    Precautions: cancer      History   History of Present Illness: Briseyda is a 64 y.o. female that presents to  [facility] at the [facility] s/p right breast surgery.   Diagnosis: right breast DCIS and IDC, triple negative, with (+) axillary lymph nodes  Chief complaint: having tightness in RUE and difficulty raising RUE upward  Surgical History: 4/3/20 right mastectomy with ALND  Briseyda Rojas  has a past surgical history that includes Hysterectomy; Wrist fracture surgery (Bilateral); Insertion of venous access port (Left, 10/30/2019); and Modified radical mastectomy w/ axillary lymph node dissection (Right, 4/3/2020).    Chemotherapy: Scott-adjuvant chemotherapy from 11/19 to 2/20  Radiation: pending    Past Medical History:   Diagnosis Date    Anemia     Anticoagulant long-term use     Breast cancer     History of chemotherapy     Hypertension     Port-A-Cath in place     Thrombus     around port          Medications:  Briseyda has a current medication list which includes the following prescription(s): apixaban, aspirin, carvedilol, cyproheptadine, gabapentin,  "hydrocodone-acetaminophen, ibuprofen, lisinopril, megestrol, methocarbamol, ondansetron, and oxycodone.    Allergies:  Review of patient's allergies indicates:  No Known Allergies     Prior Therapy: None  Social History:  lives with their spouse  Occupation: Not working  Prior Level of Function: Independent with all ADL's with RUE  Current Level of Function: difficulty raising RUE upward    Other Past Medical History: see above    Patient's Goals: I wan to regain my motion    Hand dominance: Left handed    Subjective   Pt states: Not having any pain  Pain: 0/10 on VAS currently.   Best: 0/10  Worst: 0/10   Pain location: NA   Objective   Mental status :oriented    Postural examination/scapula alignment: Rounded shoulder and Head forward    Skin Integrity:   Scar Location: right anterior chest  Appearance: healing  Signs of infection: No  Drainage:None  Color:NA    Edema: None  Location: NA    Axillary Web Syndrome/Cording:   Location: right axilla and right medial upper arm  Degree of Cording: Moderate (mild, moderate etc...)   Number of cords present: 3    Sensation: WNL         Range of Motion:      Shoulder Range of Motion:   Active /Passive ROM Right Left   Flexion 130 160   Abduction 95 170   Extension 45 80   IR/90deg 50 85   ER/90deg 40 80          Strength: manual muscle test grades below   Upper Extremity Strength   (R) UE (L) UE   Shoulder flexion: 4/5 5/5   Shoulder Abduction: 4/5 5/5   Shoulder IR 4/5 5/5   Shoulder ER 4/5 5/5   Elbow flexion: 4/5 5/5   Elbow extension: 4/5 5/5   Wrist flexion: 5/5 5/5   Wrist extension: 5/5 5/5    5/5 5/5       Baseline Measurements of BL UE's for early detection of Lymphedema:     LANDMARK RIGHT UE LEFT UE DIFFERENCE   E + 8" 23 cm 24 cm 1 cm   E + 6" 22.5 cm 23 cm 0.5 cm   E + 4" 22.5 cm 23 cm 0.5 cm   E + 2" 21.5 cm 22 cm 0.5 cm   Elbow 22 cm 22 cm 0 cm   W+ 8" 20.5 cm 21.5 cm 1 cm   W +  6" 18 cm 17 cm 1 cm   W + 4" 15 cm 15.5 cm 0.5 cm   Wrist 16 cm 16 cm 0 cm " "  DPC 18.5 cm 19 cm 0.5 cm   IP Thumb 6 cm 6 cm 0 cm     Functional Mobility (Bed mobility, transfers)  Independent    ADL's:  Difficulty raising RUE upward    Gait Assessment:   - AD used: none  - Assistance: independent  - Distance: community distances     Patient Education Provided   - role of therapy in multi - disciplinary team, goals for therapy  - Pt was educated in lymphedema etiology and management plans.    - Pt was provided with written risk reductions and precautions for managing lymphedema.     Pt has no cultural, educational or language barriers to learning provided.  Treatment and Instruction of Home Exercise Program    Time In: 11:10 AM  Time Out: 11:35 AM    Briseyda received individual therapeutic exercises to improve postural correction and alignment, stretching and soft tissue mobility, and strengthening for 25 minutes including the following:   Supine Shld flexion with wand        1 x 10  Butterflies                                        5 x 5"  Side Lying Shld Abd                       1 x 10  Seated Scap Retractions              10 x 5"  Wall Climbs     Forward                 1 x 10  ER on Wall                                    10 x 5"    Written Home Exercises Provided and Patient Education: Handouts given   See EMR under patient instructions for program given  Pt demonstrated good understanding of the education provided. Patient demo good return demo of skill of exercises.    Functional Limitations Reporting      CMS Impairment/Limitation/Restriction for FOTO Outcome Survey    Therapist reviewed FOTO scores for Briseyda Rojas on 4/16/2020.   FOTO documents entered into [employer] - see Media section.    Limitations Score: 32%  Category: Carrying             Assessment   This is a 64 y.o. female referred to outpatient physical therapy and presents with a medical diagnosis of right breast cancer and was seen today post-operatively to establish PT plan of care for impairments following surgery " including: decreased AROM and strength right shoulder and moderate cording right axilla and medial upper arm.     Pt instructed in HEP this session and was able to perform all exercises given independently. Pt instructed to follow up with therapist if any concerns arise with program established. Pt will continue to benefit from skilled physical therapy to address the impairments stated in chart below, provide patient/family education and to maximize pt's level of independence in home and community environment     Anticipated barriers to physical therapy: COVID19 precautions     Pt's spiritual, cultural and educational needs considered and pt agreeable to plan of care and goals as stated below:     Medical necessity is demonstrated by the following IMPAIRMENTS/PROMBLEM LIST:    History  Co-morbidities and personal factors that may impact the plan of care Co-morbidities:   history of cancer, HTN and thrombus around port    Personal Factors:   no deficits     moderate   Examination  Body Structures and Functions, activity limitations and participation restrictions that may impact the plan of care Body Regions:   upper extremities    Body Systems:    ROM  strength  scar formation    Participation Restrictions:   Difficulty raising RUE upward    Activity limitations:   Learning and applying knowledge  no deficits    General Tasks and Commands  no deficits    Communication  no deficits    Mobility  lifting and carrying objects    Self care  dressing    Domestic Life  cooking  doing house work (cleaning house, washing dishes, laundry)    Interactions/Relationships  no deficits    Life Areas  no deficits    Community and Social Life  no deficits         moderate   Clinical Presentation evolving clinical presentation with changing clinical characteristics moderate   Decision Making/ Complexity Score: moderate       Goals: Pt agrees with goals set    Short Term goals: 3 weeks  1. Patient will demonstrate 100% understanding of  lymphedema risk reduction practices to include self monitoring for lymphedema. (progressing, not met)  2. Patient will demonstrate independence with Home Exercise program established. (progressing, not met)  3. Pt will increase AROM/PROM in shoulder abduction ROM to 140 degrees on right to improve functional reach, carry, push, pull pain free. (progressing, not met)  4. Pt will increase AROM/PROM in shoulder flexion to 150 degrees on left to improve functional reach, carry, push, pull pain free.(progressing, not met)  5. Strength will be 4+/5 in RUE in order to perform functional activities. (progressing, not met)    Long Term Goals: 6 weeks   1.  Pt will increase AROM/PROM in shoulder flexion to 170 degrees on right to improve functional reach, carry, push, pull pain free. (progressing, not met)  2. Pt will increase strength to 5/5 in gross UE musculature to improve tolerance to all functional activities pain free. (progressing, not met)  3. Pt will demonstrate full/maximized tissue mobility to increase ROM and promote healthy tissue to be pain free at discharge. (progressing, not met)  4. Pt will report decrease in overall worst pain to 2/10 at discharge. (progressing, not met)  5. Pt will increase AROM/PROM in shoulder abduction ROM to 170 degrees on right to improve functional reach, carry, push, pull pain free. (progressing, not met)  6. Patient will report compliance with walking program 5x week for 10 min each day to improve overall cardiovascular function and decrease cancer related fatigue at discharge. (progressing, not met)      Plan   Outpatient physical therapy 2x week for 6 weeks to include the following:   Manual Therapy, Patient Education and Therapeutic Exercise.    Plan of care Certification Period: 4/16/2020 to 5/29/2020      As we are following the updates regarding COVID19 and taking every precaution to ensure the safety of our patients, staff, and community, and in an abundance of caution and  to reduce the risk and to limit community spread of this virus, I did not reschedule Ms. Rojas for a follow-up visit at this time. I will monitor Ms. Rojas weekly via telephone or telemedicine. She was given my business card with my phone number and email address. Ms. Rojas was instructed to call or email me at any time with any questions or concerns. She was also informed that she can request an in-person visit if she feels she needs to come to the clinic, as she is considered high priority since she recently had surgery.      Therapist: Sandy Carranza, PT  4/16/2020

## 2020-04-16 NOTE — PROGRESS NOTES
Tis (however 10 cm of calcs)N2 triple negative RIGHT breast cancer  Completed neoadjuvant chemo with ACT. Last dose was 2/20/2020  Now s/p right MRM.    Pathology:  [id number] (3/6LN) TNBC grade 3.    Close anterior margin for DCIS <1mm.  3/6 LN still positive.     Inc c/d/i. Drain minimal.      Plan for PMRT.  rtc 6 months  Refer to rad onc  F/u with Dr. De Leon.  May receive additional chemo after radiation.  Drain removed today.

## 2020-04-21 ENCOUNTER — TUMOR BOARD CONFERENCE (OUTPATIENT)
Dept: SURGERY | Facility: CLINIC | Age: 65
End: 2020-04-21

## 2020-04-21 NOTE — PROGRESS NOTES
Carcinoma of axillary tail of right breast in female, estrogen receptor negative    9/24/2019 Imaging Significant Findings     Mammogram and US  Impression:  Right  Calcifications: Right breast 81 mm calcifications at the upper position. Assessment: 5 - Highly suggestive of malignancy. Stereotactic/Baylee Biopsy is recommended.   Architectural Distortion: Right breast 33 mm architectural distortion at the upper outer position. Assessment: 5 - Highly suggestive of malignancy. Stereotactic/baylee Biopsy is recommended.   Lymph Node: Right axilla 67 mm x 41 mm x 72 mm lymph node. Assessment: 5 - Highly suggestive of malignancy. Ultrasound-guided biopsy is recommended.      Left  There is no mammographic or sonographic evidence of malignancy in the left breast.          Recommendation:  Stereotactic Biopsy is recommended.  Stereotactic Biopsy is recommended.  Ultrasound-guided biopsy is recommended.  Routine screening mammogram in 1 year is recommended.         10/4/2019 Biopsy     1. Right breast, 12 o'clock calcifications, stereotactic biopsy:  Fibroadipose tissue with scant benign breast elements.  Microcalcifications are identified in association with benign breast tissue.  Negative for atypia or malignancy.  2. Right breast, upper inner calcifications, stereotactic biopsy:  Ductal carcinoma in situ (DCIS), high nuclear grade,  3. Right axillary lymph node, biopsy:  Invasive ductal carcinoma, likely metastatic, [place] histologic grade 3      10/4/2019 Initial Diagnosis     Carcinoma of axillary tail of right breast in female, estrogen receptor negative      10/4/2019 Breast Tumor Markers     Estrogen Receptor: Negative  Progesterone Receptor: Negative  HER2: Negative  Ki67: >30%      10/22/2019 [facility] Testing     [facility]  VUS, ANIBAL, AX1N2, MSH2      10/29/2019 Imaging Significant Findings     Breast MRI  Impression:  Right  Lymph Node: Right axilla 67 mm x 41 mm x 72 mm lymph node. Assessment: 6 - Known  biopsy, proven malignancy.      Non-mass Enhancement: Right breast non-mass enhancement at the central position (multiple findings). Assessment: 6 - Known biopsy, proven malignancy.      Left  There is no mammographic evidence of malignancy.          Recommendation:  The patient is currently under the management of Breast Surgery and Oncology.  Further recommendations for the patient's care will be guided by this team.  Imaging will be performed as clinically indicated.         11/1/2019 - 1/8/2020 Chemotherapy     Treatment Summary   Plan Name: OP BREAST DOSE-DENSE AC - DOXORUBICIN CYCLOPHOSPHAMIDE Q2W  Treatment Goal: Curative  Status: Inactive  Start Date: 11/13/2019  End Date: 12/27/2019  Provider: Varun Armas MD  Chemotherapy: DOXOrubicin chemo injection 100 mg, 60 mg/m2 = 100 mg, Intravenous, Clinic/HOD 1 time, 4 of 4 cycles  Administration: 100 mg (11/13/2019), 100 mg (11/27/2019), 100 mg (12/12/2019), 100 mg (12/26/2019)  cyclophosphamide (CYTOXAN) 600 mg/m2 = 1,010 mg in sodium chloride 0.9% 250 mL chemo infusion, 600 mg/m2 = 1,010 mg, Intravenous, Clinic/HOD 1 time, 4 of 4 cycles  Administration: 1,010 mg (11/13/2019), 1,010 mg (11/27/2019), 1,010 mg (12/12/2019), 1,000 mg (12/26/2019)      11/11/2019 Imaging Significant Findings     Bone scan and CT  Impression       No scintigraphic evidence of metastatic disease.    Asymmetric diffuse uptake throughout the right chest consistent with patient's known right-sided breast cancer.    Degenerative changes in the bilateral feet and lumbar spine.     Impression       In this patient with a history of triple negative breast cancer, there a ill-defined, heterogenous soft tissue mass measuring 4.5 x 4.0 cm with central hypodensity likely central necrosis in the right adnexa.  There are additional enlarged right axillary lymph nodes.  Overall the appearance of the axilla is similar to patient's prior study.    There is skin thickening and enlargement of the  right breast compared to the left breast.    Scattered diverticula without diverticulitis.    Interval left-sided chest port placement.           1/9/2020 - 2/21/2020 Chemotherapy     Treatment Summary   Plan Name: OP BREAST PACLITAXEL Q2W  Treatment Goal: Curative  Status: Active  Start Date: 1/9/2020  End Date: 2/21/2020  Provider: Varun Armas MD  Chemotherapy: PACLitaxel (TAXOL) 175 mg/m2 = 288 mg in sodium chloride 0.9% 500 mL chemo infusion, 175 mg/m2 = 288 mg, Intravenous, Clinic/HOD 1 time, 4 of 4 cycles  Administration: 288 mg (1/9/2020), 288 mg (1/23/2020), 288 mg (2/6/2020), 252 mg (2/20/2020)        3/5/2020 Imaging Significant Findings     Breast MRI  Impression:  Essentially non diagnostic breast MRI due to patient motion artifact.      BI-RADS Category:   Overall: 6 - Known Biopsy-Proven Malignancy     Recommendation:  If it will  a right breast mammogram and/or axillary ultrasound could be performed to evaluate response to therapy.             3/10/2020 Imaging Significant Findings     Bone scan  Impression       There is no scintigraphic evidence of metastatic disease.           3/19/2020 Imaging Significant Findings     CT scan  Impression       No evidence of metastatic disease    Deep venous thrombosis involving the distal left internal jugular vein/innominate junction surrounding indwelling left jugular medication port.           4/3/2020 Breast Surgery     Surgery: Dr Mireille Raya, [phone] performed right modified radical mastectomy with pathology showing grade 2 invasive ductal carcinoma, 3 mm with 3 of 6 positive lymph nodes.         4/3/2020 Cancer Staged     ypT1a N1mi      4/21/2020 Tumor Conference        Discuss radiation therapy. (Referral in, messaged Dr. Hernandez's office to call patient for an appt). Residual disease post neoadjuvant chemo, patient could do 6 rounds of Xeloda. Patient is a candidate for Pembro trial. Concerned about patient compliance  .          Breast cancer, right breast    10/30/2019 Initial Diagnosis     Breast cancer, right breast      1/9/2020 -  Chemotherapy     Treatment Summary   Plan Name: OP BREAST PACLITAXEL Q2W  Treatment Goal: Curative  Status: Active  Start Date: 1/9/2020  End Date: 2/21/2020  Provider: Varun Armas MD  Chemotherapy: PACLitaxel (TAXOL) 175 mg/m2 = 288 mg in sodium chloride 0.9% 500 mL chemo infusion, 175 mg/m2 = 288 mg, Intravenous, Clinic/HOD 1 time, 4 of 4 cycles  Administration: 288 mg (1/9/2020), 288 mg (1/23/2020), 288 mg (2/6/2020), 252 mg (2/20/2020)

## 2020-04-22 ENCOUNTER — TELEPHONE (OUTPATIENT)
Dept: REHABILITATION | Facility: HOSPITAL | Age: 65
End: 2020-04-22

## 2020-04-22 NOTE — TELEPHONE ENCOUNTER
I called Ms. Rojas today to see how she is doing. She states she has been performing her HEP and her right arm feels better and she is able to move her right arm much easier. I recommended she continue with her HEP. I will continue to monitor her progress via telephone due to COVID19 precautions.    Sandy Carranza, PT

## 2020-04-23 ENCOUNTER — OFFICE VISIT (OUTPATIENT)
Dept: RADIATION ONCOLOGY | Facility: CLINIC | Age: 65
End: 2020-04-23
Payer: MEDICAID

## 2020-04-23 DIAGNOSIS — C50.911 MALIGNANT NEOPLASM OF RIGHT BREAST IN FEMALE, ESTROGEN RECEPTOR NEGATIVE, UNSPECIFIED SITE OF BREAST: ICD-10-CM

## 2020-04-23 DIAGNOSIS — Z17.1 MALIGNANT NEOPLASM OF RIGHT BREAST IN FEMALE, ESTROGEN RECEPTOR NEGATIVE, UNSPECIFIED SITE OF BREAST: ICD-10-CM

## 2020-04-23 DIAGNOSIS — C50.311 MALIGNANT NEOPLASM OF LOWER-INNER QUADRANT OF RIGHT BREAST OF FEMALE, ESTROGEN RECEPTOR NEGATIVE: Primary | ICD-10-CM

## 2020-04-23 DIAGNOSIS — Z17.1 CARCINOMA OF AXILLARY TAIL OF RIGHT BREAST IN FEMALE, ESTROGEN RECEPTOR NEGATIVE: ICD-10-CM

## 2020-04-23 DIAGNOSIS — Z17.1 MALIGNANT NEOPLASM OF LOWER-INNER QUADRANT OF RIGHT BREAST OF FEMALE, ESTROGEN RECEPTOR NEGATIVE: Primary | ICD-10-CM

## 2020-04-23 DIAGNOSIS — C50.611 CARCINOMA OF AXILLARY TAIL OF RIGHT BREAST IN FEMALE, ESTROGEN RECEPTOR NEGATIVE: ICD-10-CM

## 2020-04-23 PROCEDURE — 99203 PR OFFICE/OUTPT VISIT, NEW, LEVL III, 30-44 MIN: ICD-10-PCS | Mod: 95,,, | Performed by: RADIOLOGY

## 2020-04-23 PROCEDURE — 99203 OFFICE O/P NEW LOW 30 MIN: CPT | Mod: 95,,, | Performed by: RADIOLOGY

## 2020-04-23 NOTE — PATIENT INSTRUCTIONS
Return fro ct/sim in about 3 weeks  Radiation Therapy Treatment  Radiation therapy can help you in your fight against cancer. It begins with a session to discuss treatment with your doctor. If you and your doctor decide on radiation, you will return for a simulation. The simulation is a planning session that helps the doctor target your cancer. He or she will design a radiation plan to protect normal tissues. When the simulation and plan are completed, you will begin your daily treatments. Treatment is usually once daily Monday to Friday. It takes less than a half an hour. Sometimes you may need radiation twice a day, with usually 6 hours between treatments. After the course of radiation is complete, you will be scheduled for follow-up appointments. This is to make sure the cancer is under control. The follow-ups will also make sure that any side effects from the treatment are taken care of.  Radiation therapy uses high-energy X-rays to kill cancer cells.   Your treatment planning visit: The simulation  Your radiation therapy team uses a special machine called a simulator to map out your treatment. The simulator is usually an X-ray machine (fluoroscopy), CT scanner, MRI scanner, or PET-CT scanner machine. Laser lights act as guides to help position your body accurately. During this visit:  · The team figures out the best position for your body. They make notes in your chart so youll be placed the same way each time.  · You may use special devices to keep your body correctly positioned and still during treatment. These may include molds, masks, rests, and blocks.  · The team makes ink marks on your skin. These will help you get in the same position for each treatment. Tiny permanent tattoos may also be used.   · Markers such as metal balls or wires may be put on or in your body. Sometime these are taped to the skin to help with the imaging process. These work with the X-rays to position your body. The markers are  removed when the visit is over.  After the team has the imaging and data, the information is sent into the computer planning system. Your doctor and the team of physicists and dosimetrists design a treatment field. The field will best target your cancer and how it might spread. It will also help limit radiation to nearby normal tissues.  Your treatments  Each treatment usually takes 10 to 30 minutes. You may need to change into a hospital gown. The radiation therapist puts you in the correct position on the treatment table, then leaves the room. Sometimes you may need more imaging before each treatment. The machine may take digital X-rays or a CT scan to help make sure you are lined up correctly. During treatment, lie as still as you can and breathe normally. You will hear noises coming from the machine. You can talk to the radiation therapist, who watches you from the control room on a TV monitor. After treatment, the therapist will help you off the table. You can then get dressed and go back to your normal activities.  Date Last Reviewed: 1/14/2016 © 2000-2017 [employer]. [address] [place] [place] [address]. All rights reserved. This information is not intended as a substitute for professional medical care. Always follow your healthcare professional's instructions.        Discharge Instructions for Radiation Therapy  Radiation therapy uses high-energy X-rays to kill cancer cells and help you in your fight against cancer. Radiation destroys cancer cells gradually, over time. The goal of therapy is to focus on and kill as many cancer cells as possible. Radiation can also damage or kill some of the normal cells that are closest to the tumor. Damaged normal cells can repair themselves, often within a few days.  Caring for your skin  You should ask your healthcare provider for specific products that he or she recommends for washing and bathing. In general, use a mild nondetergent soap and warm  "(not hot) water to clean the area receiving radiation. Pat the region dry rather than rubbing.  Your healthcare provider may give you products to moisturize the skin and prevent infection. The goal is to prevent cracks or breaks in the skin that may be sensitive from treatment:   · Dont be surprised if your treatment causes skin redness, and a type of "sunburn" over time. Some radiation treatments can cause this.   · Ask your therapy team what lotion to use. Also ask for directions about when and how to apply it.  · Avoid prolonged or direct sunlight on the treated area. Ask your therapy team about using a sunscreen. You do not have to avoid going outside altogether, but must take appropriate precautions.  · Dont remove ink marks unless your radiation therapist says its OK. Dont scrub or use soap on the marks when you wash. Let water run over them and pat them dry.  · Protect your skin from heat or cold. Avoid hot tubs, saunas, heating pads, or ice packs.  · Avoid clothing that causes friction or rubbing on the skin.  Fighting fatigue  Radiation therapy may cause you to feel tired. Your body is working hard to heal and repair itself. To feel better, try these things:  · Do light exercise each day. Take short walks.  · Plan tasks for the times when you tend to have the most energy. Ask for help when you need it.  · Relax before you go to bed. This will help you sleep better. Try reading or listening to soothing music.  Coping with appetite changes  Here are ways to cope:  · Tell your therapy team if you find it hard to eat or you have no appetite. You may be referred to a nutritionist to help you with meal planning.  · Radiation to certain internal sites can cause nausea, depending on the location of treatment. This can affect your appetite. Think of healthy eating as part of your treatment. Try these tips:  ¨ Eat slowly.  ¨ Eat small meals several times a day.  ¨ Eat more food when youre feeling better.  ¨ Ask " others to keep you company when you eat.  ¨ Stock up on easy-to-prepare foods.  ¨ Eat foods high in protein and calories. Your healthcare provider may recommend liquid meal supplements.  ¨ Drink plenty of water and other fluids.  ¨ Ask your healthcare provider before taking any vitamins or over-the-counter supplements. Such products are not regulated by the FDA and can sometimes interfere with your treatments.   Dealing with other side effects  Here are suggestions to deal with other side effects:   · Be prepared for hair loss in the area being treated. The hair loss may be permanent. Be sure to discuss this with your healthcare provider.  · Sip cool water if your mouth or throat becomes dry or sore. Ice chips may also help.  · Tell your healthcare provider if you have diarrhea or constipation. You may be given a special diet.  · If you have trouble swallowing liquids, tell your healthcare provider.  Follow-up  Make a follow-up appointment as directed by your healthcare provider.     When to call your healthcare provider  Call your healthcare provider right away if you have any of the following:  · Unexpected headaches  · Trouble concentrating  · Ongoing fatigue  · Wheezing, shortness of breath, or trouble breathing  · Pain that doesnt go away, especially if its always in the same place  · New or unusual lumps, bumps, or swelling  · Dizziness or lightheadedness  · Unusual rashes, bruises, or bleeding  · Fever of 100.4°F (38°C) or higher, or chills  · Nausea and vomiting  · Diarrhea that doesnt improve with time  · Skin breakdown; significant pain due to skin irritation   Date Last Reviewed: 1/13/2016  © 0179-2081 [employer], [employer]. [address] [place] [address], [place] [address]. All rights reserved. This information is not intended as a substitute for professional medical care. Always follow your healthcare professional's instructions.        Radiation Therapy: Managing Short-Term Side Effects     Take short  walks daily.   Radiation therapy uses high-energy X-rays or particles to kill cancer cells. Some normal cells can also be affected. This causes side effects such as dry skin, tiredness (fatigue), or changes in your appetite. Most side effects go away when your radiation therapy is over.  Having side effects of radiation therapy does not mean that your cancer is getting worse or that therapy isnt working.   Caring for your skin  Skin problems may happen where your body gets radiation. Your skin may become dry, itchy, red, and peeling. It may darken in that spot, like a tan. To care for your skin:  · Dont scrub on the treatment area. Clean that area of the skin every day. Use warm water and mild soap, or as your healthcare provider advises. Pat the skin afterward or let it air dry.  · Ask your therapy team what lotion to use and when to use it.  · Keep the treated area out of the sun. Ask your team about using a sunscreen.  · Don't remove ink marks unless your radiation therapist says you can. Dont scrub the marks when you wash. Let water run over them and pat them dry.  · Protect your skin from heat or cold. Avoid hot tubs, saunas, hot pads, and ice packs.  · Wear soft, loose clothing to avoid rubbing skin.  Fighting tiredness  The cancer itself or the radiation therapy may cause you to feel tired. Your body is working hard to heal and repair itself. To feel better:  · Try light exercise each day. Take short walks.  · Plan tasks for the times when you tend to have the most energy. Ask for help when you need it.  · Relax before you go to bed to sleep better. Try reading or listening to soothing music.  · Be sure to let your cancer care team know if you continue to have fatigue that is not getting better. They may be able to offer ways to help.   Coping with appetite changes  Tell your therapy team if you find it hard to eat or have no appetite. You may need to see a nutritionist. This is a healthcare provider with  special training in meal planning. To keep your strength up, you need to eat well and maintain your weight. Think of healthy eating as part of your treatment. Try these tips:  · Eat slowly.  · Eat small meals several times a day.  · Eat more food when youre feeling better, even if it is not mealtime.  · Ask others to keep you company when you eat.  · Stock up on easy-to-prepare foods.  · Eat foods high in protein and calories.  · Drink plenty of water and other fluids.  · Ask your healthcare provider before taking any vitamins.  Site-specific side effects  These side effects include the following:   · You may lose hair in the area being treated. The hair often grows back after treatment.  · Your mouth or throat can become dry or sore if your head or neck is being treated. Sip cool water to help ease discomfort.  · Nausea and bowel changes can happen with radiation to the pelvic region. Tell your healthcare provider if you have nausea, diarrhea, or constipation. You may need to take medicine or follow a special diet.  Talk with your healthcare team  Radiation therapy can also have other side effects, including some that might not show up until years later. Be sure to talk with your healthcare team about what to expect with the type of radiation therapy you are getting, including when you should call them with concerns.   Date Last Reviewed: 5/1/2016  © 7816-5496 [employer], [employer]. [address] [place] [address], [place] [address]. All rights reserved. This information is not intended as a substitute for professional medical care. Always follow your healthcare professional's instructions.

## 2020-04-23 NOTE — PROGRESS NOTES
Established Patient - Audio Only Telehealth Visit     The patient location is: home  The chief complaint leading to consultation is: right breast cancer  Visit type: Virtual visit with audio only (telephone)     The reason for the audio only service rather than synchronous audio and video virtual visit was related to technical difficulties or patient preference/necessity.     Each patient to whom I provide medical services by telemedicine is:  (1) informed of the relationship between the physician and patient and the respective role of any other health care provider with respect to management of the patient; and (2) notified that they may decline to receive medical services by telemedicine and may withdraw from such care at any time. Patient verbally consented to receive this service via voice-only telephone call.     REFERRING PHYSICIAN:   Mireille Raya M.D., Varun Armas M.D.    DIAGNOSIS:  [id number] (yp multifocal T1a N1a), invasive ductal carcinoma the right breast    HISTORY OF PRESENT ILLNESS:   Ms. Rojas is a 64-year-old female who was recently diagnosed with locoegionally advanced right breast cancer after she presented with a palpable mass in the right axilla in August 2019.  A mammogram on September 24, 2019 revealed 33 mm area of architectural distortion in the upper outer quadrant of the right breast.  There is an area measuring at least 81 mm of fine pleomorphic calcifications in the upper medial and lateral regions of the right breast.  An ultrasound revealed a 67 x 41 x 72 mm lymph node in the right axilla.  Core needle biopsies of 2 areas of the right breast and the right axillary lymph node were done on October 4, 2019.  Pathology revealed the right breast 12 o'clock biopsy with fibroadipose tissue with benign breast elements.  The upper inner quadrant biopsy revealed high-grade ductal carcinoma in situ, cribriform type with apocrine features and comedonecrosis.  This lesion is ER/DC negative.   The right axillary lymph node was also positive for invasive ductal carcinoma.  This lesion is ER/IN negative and HER2 negative with Ki-67 index of 40%.  An MRI of the bilateral breast on October 29, 2019 revealed multiple similar foci of non mass enhancement in multiple regions in the right breast.  This is predominantly located in the upper quadrants but is also seen in the central region and lower quadrants of the right breast.  There was skin thickening and skin and trabecular edema noted.  There is a 67 x 41 x 72 mm lymph node in the right axilla.  There was bulky level 1 lymph node metastasis noted as well as 2 Level 2 retropectoral lymph nodes measuring 1.1 cm.  A CT of the chest, abdomen, and pelvis on November 11, 2019 was negative for distant metastasis.    She underwent neoadjuvant chemotherapy with dose dense AC for 4 cycles followed by 4 cycles of Taxol.  A repeat CT of the chest, abdomen, and pelvis as well as a bone scan in March 2020 was again negative for metastatic disease.  On April 3, 2020, she underwent right mastectomy and axillary dissection.  Pathology revealed the right breast with 2 foci of invasive ductal carcinoma, grade 2, with associated grade 3 DCIS, one  measuring 3 mm and the other measuring 0.5 mm. There was extensive lymphovascular invasion present.  Invasive carcinoma is greater than 10 mm from all surgical margins.  DCIS is located within less than 1 mm of the superior anterior surgical margin.  3/6 lymph nodes were positive for metastatic carcinoma with the largest focus measuring 0.5 mm without extranodal extension.  She is here today for recommendations regarding further treatment.    At present, she is healing from the surgery without any unexpected side effects.  She denies right chest wall pain, edema, erythema, or discharge.  She also denies fever, night sweats, or weight loss prior to her diagnosis, but did have approximately 20 lbs weigh loss during chemotherapy, which  she is gaining back.    REVIEW OF SYSTEMS:  As above.  In addition, patient denies headaches, visual problems, dizziness, chest pain, shortness of breath, cough, nausea, vomiting, diarrhea, or any new bony pains. Patient also denies easy bruising, skin rashes, or numbness or tingling.    GYN HISTORY:   Menarche age 14.  Menopause more than 10 years ago.    ECO    PAST MEDICAL HISTORY:  Past Medical History:   Diagnosis Date    Anemia     Anticoagulant long-term use     Breast cancer     History of chemotherapy     Hypertension     Port-A-Cath in place     Thrombus     around port       PAST SURGICAL HISTORY:  Past Surgical History:   Procedure Laterality Date    HYSTERECTOMY      INSERTION OF VENOUS ACCESS PORT Left 10/30/2019    Procedure: INSERTION, VENOUS ACCESS PORT;  Surgeon: Kraig Kahn Jr., MD;  [facility];  Service: General;  Laterality: Left;  Left internal jugular Port-A-Cath Placement    MODIFIED RADICAL MASTECTOMY W/ AXILLARY LYMPH NODE DISSECTION Right 4/3/2020    Procedure: MASTECTOMY, MODIFIED RADICAL;  Surgeon: Mireille Raya MD;  Location: [facility] OR;  Service: General;  Laterality: Right;    WRIST FRACTURE SURGERY Bilateral        ALLERGIES:   Review of patient's allergies indicates:  No Known Allergies    MEDICATIONS:  Current Outpatient Medications   Medication Sig    apixaban (ELIQUIS) 5 mg Tab Take 10 mg twice a day for 1 week followed by 5 mg twice a day.    aspirin 81 MG Chew Take 1 tablet (81 mg total) by mouth once daily.    carvedilol (COREG) 12.5 MG tablet Take 1 tablet (12.5 mg total) by mouth 2 (two) times daily.    cyproheptadine (PERIACTIN) 4 mg tablet Take 1 tablet (4 mg total) by mouth 2 (two) times daily as needed (for appetite).    gabapentin (NEURONTIN) 300 MG capsule Take 1 capsule (300 mg total) by mouth 3 (three) times daily.    HYDROcodone-acetaminophen (NORCO) 5-325 mg per tablet Take 1 tablet by mouth every 6 (six) hours as needed for Pain.  (Patient not taking: Reported on 4/16/2020)    ibuprofen (ADVIL,MOTRIN) 800 MG tablet Take 800 mg by mouth every 8 (eight) hours as needed.    lisinopril (PRINIVIL,ZESTRIL) 20 MG tablet Take 1 tablet (20 mg total) by mouth once daily.    megestrol (MEGACE) 400 mg/10 mL (40 mg/mL) Susp Take 10 mLs (400 mg total) by mouth once daily.    ondansetron (ZOFRAN) 8 MG tablet Take on every 12 hours x 3 days post chemotherapy, and then one every 12 hours as needed for nausea. (Patient not taking: Reported on 4/16/2020)    oxyCODONE (ROXICODONE) 5 MG immediate release tablet Take 1 every 6 hour as needed for pain (Patient not taking: Reported on 4/16/2020)     No current facility-administered medications for this visit.        SOCIAL HISTORY:  Social History     Socioeconomic History    Marital status:      Spouse name: Not on file    Number of children: Not on file    Years of education: Not on file    Highest education level: Not on file   Occupational History    Not on file   Social Needs    Financial resource strain: Not on file    Food insecurity:     Worry: Not on file     Inability: Not on file    Transportation needs:     Medical: Not on file     Non-medical: Not on file   Tobacco Use    Smoking status: Current Every Day Smoker     Packs/day: 0.50     Years: 46.00     Pack years: 23.00    Smokeless tobacco: Current User    Tobacco comment: smoking few cig per day now, smoking cessation packet  given & discussed   Substance and Sexual Activity    Alcohol use: No    Drug use: No    Sexual activity: Not on file   Lifestyle    Physical activity:     Days per week: Not on file     Minutes per session: Not on file    Stress: Not on file   Relationships    Social connections:     Talks on phone: Not on file     Gets together: Not on file     Attends Anabaptist service: Not on file     Active member of club or organization: Not on file     Attends meetings of clubs or organizations: Not on file      Relationship status: Not on file   Other Topics Concern    Not on file   Social History Narrative    Not on file       FAMILY HISTORY:  Family History   Problem Relation Age of Onset    Ovarian cancer Mother     Breast cancer Maternal Grandmother          PHYSICAL EXAMINATION:  There were no vitals filed for this visit.There is no height or weight on file to calculate BMI.  GENERAL: Patient is alert and oriented, in no acute distress.  HEENT:Extraocular muscles are intact.    BREAST EXAM: deferred    ASSESSMENT:   This is a 64-year-old female with locoregionally advanced multifocal [id number] ([id number]), invasive ductal carcinoma of the right breast who underwent neoadjuvant chemotherapy followed by right mastectomy and axillary dissection on April 3, 2020 with residual 3 mm and 0.5 mm foci grade 2 invasive ductal carcinoma with associated grade 3 DCIS, with the closest margin at less than 1 mm superior/anteriorly from the DCIS, and 3 out of 6 lymph nodes with micro metastatic involvement, ER/[place] negative, her 2-, Ki-67 index 40%.    PLAN:   After review of the pathology and images of the radiological studies, Ms. Rojas is noted to have advanced right breast cancer with large area of involvement of the right axillary region.  She is noted to have good response to neoadjuvant chemotherapy.  To reduce her chance of recurrence, she is recommended to undergo postmastectomy radiation to the right chest wall and draining lymph nodes including the right supraclavicular, right internal mammary, and right axillary region.  I plan to deliver approximately 5040 cGy +1600 cGy boost.    The risks, benefits, and side effects of radiation were explained in detail to the patient.  All questions were answered.  I plan to see the patient back for radiation planning CT in 2-3 weeks.  She will sign consent at that time.    Psychosocial Distress screening score of 0 noted and reviewed. No intervention indicated.    I spent approximately  60 minutes reviewing the available records and evaluating the patient, out of which over 50% of the time was spent over the phone with the patient in counseling and coordinating this patient's care.      This service was not originating from a related E/M service provided within the previous 7 days nor will  to an E/M service or procedure within the next 24 hours or my soonest available appointment.  Prevailing standard of care was able to be met in this audio-only visit.

## 2020-04-23 NOTE — LETTER
April 23, 2020      Mireille Raya MD  [address]  [facility] [place] [address]           [facility] - Radiation Oncology  [address]  [facility] [address]  Phone: [phone]          Patient: Briseyda Rojas   MR Number: [id number]   YOB: 1955   Date of Visit: 4/23/2020       Dear Dr. Mireille Raya:    Thank you for referring Briseyda Rojas to me for evaluation. Attached you will find relevant portions of my assessment and plan of care.    If you have questions, please do not hesitate to call me. I look forward to following Briseyda Rojas along with you.    Sincerely,    Roopa Hernandez MD    Enclosure  CC:  No Recipients    If you would like to receive this communication electronically, please contact [email or web address] or [phone] to request more information on [employer] access.    For providers and/or their staff who would like to refer a patient to [facility], please contact us through our one-stop-shop provider referral line, [facility], at [phone].    If you feel you have received this communication in error or would no longer like to receive these types of communications, please e-mail [email or web address]

## 2020-04-27 ENCOUNTER — TELEPHONE (OUTPATIENT)
Dept: HEMATOLOGY/ONCOLOGY | Facility: CLINIC | Age: 65
End: 2020-04-27

## 2020-04-27 ENCOUNTER — OFFICE VISIT (OUTPATIENT)
Dept: HEMATOLOGY/ONCOLOGY | Facility: CLINIC | Age: 65
End: 2020-04-27
Payer: MEDICAID

## 2020-04-27 VITALS
SYSTOLIC BLOOD PRESSURE: 121 MMHG | HEIGHT: 66 IN | WEIGHT: 97.44 LBS | HEART RATE: 83 BPM | OXYGEN SATURATION: 84 % | BODY MASS INDEX: 15.66 KG/M2 | TEMPERATURE: 98 F | DIASTOLIC BLOOD PRESSURE: 88 MMHG | RESPIRATION RATE: 14 BRPM

## 2020-04-27 DIAGNOSIS — Z17.1 CARCINOMA OF AXILLARY TAIL OF RIGHT BREAST IN FEMALE, ESTROGEN RECEPTOR NEGATIVE: Primary | ICD-10-CM

## 2020-04-27 DIAGNOSIS — D64.81 ANEMIA ASSOCIATED WITH CHEMOTHERAPY: ICD-10-CM

## 2020-04-27 DIAGNOSIS — T45.1X5A ANEMIA ASSOCIATED WITH CHEMOTHERAPY: ICD-10-CM

## 2020-04-27 DIAGNOSIS — C50.611 CARCINOMA OF AXILLARY TAIL OF RIGHT BREAST IN FEMALE, ESTROGEN RECEPTOR NEGATIVE: Primary | ICD-10-CM

## 2020-04-27 PROCEDURE — 99214 PR OFFICE/OUTPT VISIT, EST, LEVL IV, 30-39 MIN: ICD-10-PCS | Mod: S$PBB,,, | Performed by: INTERNAL MEDICINE

## 2020-04-27 PROCEDURE — 99999 PR PBB SHADOW E&M-EST. PATIENT-LVL IV: CPT | Mod: PBBFAC,,, | Performed by: INTERNAL MEDICINE

## 2020-04-27 PROCEDURE — 99999 PR PBB SHADOW E&M-EST. PATIENT-LVL IV: ICD-10-PCS | Mod: PBBFAC,,, | Performed by: INTERNAL MEDICINE

## 2020-04-27 PROCEDURE — 99214 OFFICE O/P EST MOD 30 MIN: CPT | Mod: S$PBB,,, | Performed by: INTERNAL MEDICINE

## 2020-04-27 PROCEDURE — 99214 OFFICE O/P EST MOD 30 MIN: CPT | Mod: PBBFAC | Performed by: INTERNAL MEDICINE

## 2020-04-27 RX ORDER — OXYCODONE HYDROCHLORIDE 5 MG/1
TABLET ORAL
Qty: 40 TABLET | Refills: 0 | Status: ON HOLD | OUTPATIENT
Start: 2020-04-27 | End: 2020-07-21 | Stop reason: HOSPADM

## 2020-04-27 NOTE — TELEPHONE ENCOUNTER
Noted.    ----- Message from Alexandrea Sosa sent at 4/27/2020 12:05 PM CDT -----  Contact: Mr. Rojas      tel:   [phone]   Caller says he is bringing Briseyda in a little earlier for the appt. Today.    Scheduled for 2:40pm today.        Returning your call.

## 2020-04-27 NOTE — PROGRESS NOTES
Subjective:       Patient ID: Briseyda Rojas is a 64 y.o. female.    Chief Complaint: No chief complaint on file.        Briseyda Rojas presents today for follow-up.  On April 3, 2020 she underwent a right mastectomy and AND. The pathology report indicates that she had DCIS and two residual foci of invasive carcinoma measuring 3 and 5 mm respectively.  Three of 6 lymph nodes were positive.  She has made an uneventful recovery and she is here to discuss adjuvant treatment options.    Briefly, she  is a 64 y.o. female whom I saw initially on October 22, 2019 for a recently diagnosed triple negative right breast cancer.  She underwent mammography and ultrasound in early September of this year which showed architectural distortion in the right upper lateral breast and pleomorphic calcifications in the right upper mid breast.  Of note, this was her first mammogram ever.  Ultrasound of the axilla was also performed which showed a prominent lymph node.  She underwent biopsy of all 3 lesions. The right upper lateral lesion returned as DCIS with comedo necrosis.  The right upper mid breast came back as normal.  The right axillary lymph node returned as infiltrating ductal carcinoma, ER negative, [place] negative, HER2 equivocal but negative by FISH.  She was been seen by Dr. Raya and she was referred for consideration of neoadjuvant chemotherapy.  She has so far completed 4 cycles of neoadjuvant Adriamycin and cyclophosphamide and 4 cycles of taxol.  She subsequently underwent a right mastectomy with results as above.    Review of Systems      Overall she feels fair, and she complains of pain in the right chest wall and right axilla.  She is asking for refill on her oxycodone.   She also states that she is experiencing numbness and pain of her fingers and toes.   She appears anxious but she denies any depression, easy bruising, chills, night  sweats,  fever, constipation, diplopia,  blurred vision, headache, chest pain, palpitations,  shortness of breath, left breast pain, abdominal pain, extremity pain, or difficulty ambulating.  The remainder of the ten-point ROS, including general, skin, lymph, H/N, cardiorespiratory, GI, , Neuro, Endocrine, and psychiatric is negative.     Objective:      Physical Exam        She is alert, oriented to time, place, person, appears anxious but she is in no acute distress.                              VITAL SIGNS:  Reviewed                                      HEENT:  Normal.  There are no nasal, oral, lip, gingival, auricular, or lid lesions.  She does have pterygia on both eyes.  Mucosae are moist and pink, and there is no        thrush.  Pupils are equal, reactive to light and accommodation.              Extraocular muscle movements are intact.  Dentition is poor with several decayed teeth.  There is no frontal or maxillary tenderness.                                     NECK:  Supple without JVD, adenopathy, or thyromegaly.                       LUNGS:  Clear to auscultation without wheezing, rales, or rhonchi.           CARDIOVASCULAR:  Reveals an S1, S2, no murmurs, no rubs, no gallops.         ABDOMEN:  Soft, nontender, without organomegaly.  Bowel sounds are    present.                                                                     EXTREMITIES:  No cyanosis, clubbing, or edema.                               BREASTS:   she is now status post right mastectomy.  Her incision is healing nicely and there is no evidence for chest wall recurrence.       A left sided portacath is again identified.      There are no masses in the left breast.                             LYMPHATIC:  There is no cervical, left axillary, or supraclavicular adenopathy.   SKIN:  Warm and moist, without petechiae, rashes, induration, or ecchymoses.           NEUROLOGIC:  DTRs are 0-1+ bilaterally, symmetrical, motor function is 5/5,  and cranial nerves are  within normal limits.    Assessment:       1. Triple negative breast  cancer, s/p neoadjuvant chemotherapy, status post mastectomy with significant residual disease   2. 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  Patient is on apixaban.         Plan:       I had a long discussion with her.  She had a significant amount of residual disease.  We discussed the adjuvant pembrolizumab trial.  She is not interested in participating, and stated in no uncertain terms that she wants to have her port taken out.  I therefore recommended that she consider adjuvant chemotherapy with capecitabine.  This will be administered after she completes her radiation therapy.  She has already seen Dr. Mary hamm and simulation is planned in the next 2 weeks.  I will therefore see her in mid June to discuss adjuvant capecitabine.  I will also alert Dr. Raya to arrange for port removal.    She will remain on apixaban for three months for her catheter associated thrombosis.   Her multiple questions were answered to her satisfaction.  A prescription for 40 oxycodone 5 mg tablets was sent to her pharmacy.  Duration of the visit was 30 minutes.

## 2020-05-01 ENCOUNTER — HOSPITAL ENCOUNTER (OUTPATIENT)
Dept: RADIATION THERAPY | Facility: HOSPITAL | Age: 65
Discharge: HOME | End: 2020-05-01
Attending: RADIOLOGY
Payer: MEDICAID

## 2020-05-04 ENCOUNTER — TELEPHONE (OUTPATIENT)
Dept: REHABILITATION | Facility: HOSPITAL | Age: 65
End: 2020-05-04

## 2020-05-04 NOTE — TELEPHONE ENCOUNTER
I called Ms. Rojas 4/29 and today, 5/4 to see how she is doing. Both times her  answered the phone. Today, he told me she was not available to talk with me and he stated she is doing better and has been performing her exercises. He states he will have her call me with any questions or concerns and that I did not need to continue to call her!   I will keep her chart open until the end of her pOC which is 5/29/20.    Sandy Carranza, PT

## 2020-05-12 DIAGNOSIS — Z17.1 CARCINOMA OF AXILLARY TAIL OF RIGHT BREAST IN FEMALE, ESTROGEN RECEPTOR NEGATIVE: Primary | ICD-10-CM

## 2020-05-12 DIAGNOSIS — C50.611 CARCINOMA OF AXILLARY TAIL OF RIGHT BREAST IN FEMALE, ESTROGEN RECEPTOR NEGATIVE: Primary | ICD-10-CM

## 2020-05-13 ENCOUNTER — HOSPITAL ENCOUNTER (OUTPATIENT)
Dept: RADIATION THERAPY | Facility: HOSPITAL | Age: 65
Discharge: HOME | End: 2020-05-13
Attending: RADIOLOGY
Payer: MEDICAID

## 2020-05-13 ENCOUNTER — DOCUMENTATION ONLY (OUTPATIENT)
Dept: RADIATION ONCOLOGY | Facility: CLINIC | Age: 65
End: 2020-05-13

## 2020-05-13 PROCEDURE — 77334 RADIATION TREATMENT AID(S): CPT | Mod: 26,,, | Performed by: RADIOLOGY

## 2020-05-13 PROCEDURE — 77263 THER RADIOLOGY TX PLNG CPLX: CPT | Mod: ,,, | Performed by: RADIOLOGY

## 2020-05-13 PROCEDURE — 77290 THER RAD SIMULAJ FIELD CPLX: CPT | Mod: 26,,, | Performed by: RADIOLOGY

## 2020-05-13 PROCEDURE — 77014 HC CT GUIDANCE RADIATION THERAPY FLDS PLACEMENT: CPT | Mod: TC | Performed by: RADIOLOGY

## 2020-05-13 PROCEDURE — 77334 RADIATION TREATMENT AID(S): CPT | Mod: TC | Performed by: RADIOLOGY

## 2020-05-13 PROCEDURE — 77290 THER RAD SIMULAJ FIELD CPLX: CPT | Mod: TC | Performed by: RADIOLOGY

## 2020-05-13 PROCEDURE — 77290 PR  SET RADN THERAPY FIELD COMPLEX: ICD-10-PCS | Mod: 26,,, | Performed by: RADIOLOGY

## 2020-05-13 PROCEDURE — 77263 PR  RADIATION THERAPY PLAN COMPLEX: ICD-10-PCS | Mod: ,,, | Performed by: RADIOLOGY

## 2020-05-13 PROCEDURE — 77334 PR  RADN TREATMENT AID(S) COMPLX: ICD-10-PCS | Mod: 26,,, | Performed by: RADIOLOGY

## 2020-05-13 NOTE — NURSING
05/13/2020      discussed the following with the patient:  In an effort to protect our immunocompromised patients from potential exposure to COVID-19, [facility] will now require all patients receiving an infusion, an injection, and/or radiation therapy to be tested for COVID-19 prior to their appointment.  All patients currently under treatment will be tested immediately, and patients initiating new treatment cycles or with one-time appointments (injections, transfusions, etc.) must be tested within 72 hours of their appointment.      Symptom Patient's Response   Fever YES/NO: no   Cough YES/NO: no   Shortness of breath  YES/NO: no   Difficulty breathing YES/NO: no   GI symptoms such as diarrhea or nausea YES/NO: no   Loss of taste YES/NO: no   Loss of smell YES/NO: no     Other Screening Patient's Response   Has the patient previously undergone COVID-19 testing? YES/NO: no     If yes to the question directly above, what was the result? not applicable   Has the patient been in close contact with someone who has undergone COVID-19 testing? YES/NO: no     If yes to the question directly above, what was the result? not applicable      The patient's 24-hour COVID-19 test was ordered and scheduled.  Reviewed the appointment date, time, and location with the patient.      Advised the patient that she can expect the results to take approximately 24 hours.  Advised the patient that someone will reach out to her regarding her results if she tests positive, as her treatment appointment will be rescheduled if she tests positive for COVID-19.  Also advised the patient that if she does not hear back from our office or if she is told by our office she has tested negative for COVID-19, she can proceed with treatment as originally planned.     Questions were answered to the patient's satisfaction, and the patient verbalized understanding of information and agreement with the plan.       The above was completed in accordance with  instructions and guidelines set forth by [facility].     Signed,        Sallie Costa RN     Date:  05/13/2020

## 2020-05-18 ENCOUNTER — LAB VISIT (OUTPATIENT)
Dept: INTERNAL MEDICINE | Facility: CLINIC | Age: 65
End: 2020-05-18
Payer: MEDICAID

## 2020-05-18 DIAGNOSIS — Z17.1 CARCINOMA OF AXILLARY TAIL OF RIGHT BREAST IN FEMALE, ESTROGEN RECEPTOR NEGATIVE: ICD-10-CM

## 2020-05-18 DIAGNOSIS — C50.611 CARCINOMA OF AXILLARY TAIL OF RIGHT BREAST IN FEMALE, ESTROGEN RECEPTOR NEGATIVE: ICD-10-CM

## 2020-05-18 LAB — SARS-COV-2 RNA RESP QL NAA+PROBE: NOT DETECTED

## 2020-05-18 PROCEDURE — U0003 INFECTIOUS AGENT DETECTION BY NUCLEIC ACID (DNA OR RNA); SEVERE ACUTE RESPIRATORY SYNDROME CORONAVIRUS 2 (SARS-COV-2) (CORONAVIRUS DISEASE [COVID-19]), AMPLIFIED PROBE TECHNIQUE, MAKING USE OF HIGH THROUGHPUT TECHNOLOGIES AS DESCRIBED BY CMS-2020-01-R: HCPCS

## 2020-05-19 PROCEDURE — 77300 RADIATION THERAPY DOSE PLAN: CPT | Mod: TC | Performed by: RADIOLOGY

## 2020-05-19 PROCEDURE — 77334 RADIATION TREATMENT AID(S): CPT | Mod: 26,,, | Performed by: RADIOLOGY

## 2020-05-19 PROCEDURE — 77334 RADIATION TREATMENT AID(S): CPT | Mod: TC | Performed by: RADIOLOGY

## 2020-05-19 PROCEDURE — 77295 PR 3D RADIOTHERAPY PLAN: ICD-10-PCS | Mod: 26,,, | Performed by: RADIOLOGY

## 2020-05-19 PROCEDURE — 77295 3-D RADIOTHERAPY PLAN: CPT | Mod: 26,,, | Performed by: RADIOLOGY

## 2020-05-19 PROCEDURE — 77334 PR  RADN TREATMENT AID(S) COMPLX: ICD-10-PCS | Mod: 26,,, | Performed by: RADIOLOGY

## 2020-05-19 PROCEDURE — 77300 PR RADIATION THERAPY,DOSIMETRY PLAN: ICD-10-PCS | Mod: 26,,, | Performed by: RADIOLOGY

## 2020-05-19 PROCEDURE — 77295 3-D RADIOTHERAPY PLAN: CPT | Mod: TC | Performed by: RADIOLOGY

## 2020-05-19 PROCEDURE — 77300 RADIATION THERAPY DOSE PLAN: CPT | Mod: 26,,, | Performed by: RADIOLOGY

## 2020-05-20 ENCOUNTER — TELEPHONE (OUTPATIENT)
Dept: RADIATION ONCOLOGY | Facility: CLINIC | Age: 65
End: 2020-05-20

## 2020-05-20 ENCOUNTER — DOCUMENTATION ONLY (OUTPATIENT)
Dept: ONCOLOGY | Facility: HOSPITAL | Age: 65
End: 2020-05-20

## 2020-05-20 ENCOUNTER — TELEPHONE (OUTPATIENT)
Dept: SURGERY | Facility: CLINIC | Age: 65
End: 2020-05-20

## 2020-05-20 PROCEDURE — 77280 THER RAD SIMULAJ FIELD SMPL: CPT | Mod: TC | Performed by: RADIOLOGY

## 2020-05-20 PROCEDURE — 77280 THER RAD SIMULAJ FIELD SMPL: CPT | Mod: 26,,, | Performed by: RADIOLOGY

## 2020-05-20 PROCEDURE — 77280 PR  SET RADN THERAPY FIELD SIMPLE: ICD-10-PCS | Mod: 26,,, | Performed by: RADIOLOGY

## 2020-05-20 NOTE — TELEPHONE ENCOUNTER
Spoke to Patient  who is confused about the Radiation appointments for his wife.   asked if he could speak to someone to clarify.  I informed him that I would have someone call him.

## 2020-05-20 NOTE — PROGRESS NOTES
In response to in basket msg from Tait Parra RN, Promise called pt's caregiver () to discuss financial assistance.  Mr. Rojas stated that he is paying someone to take him and his wife to and from radiation treatments.  According to pt's former SW Delores Tejeda, [facility] pt has exhausted all her [employer] financial assistance ($1000), [facility] and [facility].  This SW applied for gas cards for the family  On 4/13/20 and Mr. Rojas acknowledged that he received them in the mail. This SW explained to Mr. Rojas that Aldair Bhaskar should replenish gas cards if he calls them to let them know their level of need.  Sw provided Mr. Rojas with the name and number of PEREZ Muro He agreed to call tomorrow. SW provided her name and contact information and encouraged pt to call with any future needs. Encouraged pt call SW to report status after talking to PEREZ Muro.  He agreed.

## 2020-05-21 PROCEDURE — 77412 RADIATION TX DELIVERY LVL 3: CPT | Performed by: RADIOLOGY

## 2020-05-22 ENCOUNTER — DOCUMENTATION ONLY (OUTPATIENT)
Dept: HEMATOLOGY/ONCOLOGY | Facility: CLINIC | Age: 65
End: 2020-05-22

## 2020-05-22 PROCEDURE — 77412 RADIATION TX DELIVERY LVL 3: CPT | Performed by: RADIOLOGY

## 2020-05-22 NOTE — PROGRESS NOTES
Promise arranged [employer] rides for pt's radiation appts every weekday from 5/25/20 to 7/10/20 (except 7/4/20) and called [employer] to confirm receipt of fax.

## 2020-05-25 PROCEDURE — 77412 RADIATION TX DELIVERY LVL 3: CPT | Performed by: RADIOLOGY

## 2020-05-26 ENCOUNTER — DOCUMENTATION ONLY (OUTPATIENT)
Dept: RADIATION ONCOLOGY | Facility: CLINIC | Age: 65
End: 2020-05-26

## 2020-05-26 PROCEDURE — 77412 RADIATION TX DELIVERY LVL 3: CPT | Performed by: RADIOLOGY

## 2020-05-26 NOTE — PLAN OF CARE
Pt. On day 4 of outpt. Xrt to chest wall.  Pt. Doing well.  Using cream.  No c/o.  Request to speak to SW.  Given KIMBERLEE Magaña P#.

## 2020-05-27 ENCOUNTER — DOCUMENTATION ONLY (OUTPATIENT)
Dept: ONCOLOGY | Facility: HOSPITAL | Age: 65
End: 2020-05-27

## 2020-05-27 PROCEDURE — 77412 RADIATION TX DELIVERY LVL 3: CPT | Performed by: RADIOLOGY

## 2020-05-27 PROCEDURE — 77336 RADIATION PHYSICS CONSULT: CPT | Performed by: RADIOLOGY

## 2020-05-27 NOTE — PROGRESS NOTES
Pt called Sw today and asked for financial assistance.  Pt has utilized all her OCI assistance ($1000) and has received help from other sources.  SW spoke with SW supervisor Danay Huertas LCSW.  She made suggestion of avenues to pursue.  Sw will pursue avenues and follow up with pt tomorrow.

## 2020-05-28 ENCOUNTER — DOCUMENTATION ONLY (OUTPATIENT)
Dept: REHABILITATION | Facility: HOSPITAL | Age: 65
End: 2020-05-28

## 2020-05-28 ENCOUNTER — DOCUMENTATION ONLY (OUTPATIENT)
Dept: ONCOLOGY | Facility: HOSPITAL | Age: 65
End: 2020-05-28

## 2020-05-28 DIAGNOSIS — C50.611 CARCINOMA OF AXILLARY TAIL OF RIGHT BREAST IN FEMALE, ESTROGEN RECEPTOR NEGATIVE: ICD-10-CM

## 2020-05-28 DIAGNOSIS — Z91.89 AT RISK FOR LYMPHEDEMA: ICD-10-CM

## 2020-05-28 DIAGNOSIS — M25.611 DECREASED SHOULDER MOBILITY, RIGHT: ICD-10-CM

## 2020-05-28 DIAGNOSIS — Z17.1 CARCINOMA OF AXILLARY TAIL OF RIGHT BREAST IN FEMALE, ESTROGEN RECEPTOR NEGATIVE: ICD-10-CM

## 2020-05-28 NOTE — PROGRESS NOTES
Discharge Note  Ms. Rojas was seen in outpatient physical therapy for an initial evaluation on 4/16/20 for decreased shoulder mobility s/p right mastectomy. Ms. Rojas attended NO follow up visits due to COVID 19 precautions. I was monitoring her via telephone --first call on 4/22/20 she stated she was doing better, moving her RUE better and performing HEP. Second phone call, I spoke with her  and then her son, both of whom stated she was doing better and that they would have her call me if she had any questions and concerns, and indicated I did not need to continue to call and check up on her. Ms. Rojas has not called with any questions or concerns. POC has ended and patient is discharged from physical therapy.        Sandy Carranza, PT

## 2020-05-28 NOTE — PROGRESS NOTES
Kimberlee researched several Covid relief funds on pt's behalf and called Emili Pascual of [employer].  The relief funds reviewed all required electronic access that pt doesn't have.  SW applied for one fund that provides gift cards but had to use SW email address because and email address was required and pt doesn't have one. Other's required a paypal account.  Emili Pascual will send an application for a [employer] daniel to Sw email and Kimberlee will complete and submit mehnaz on pt's behalf.    KIMBERLEE left msg for pt to inquire about whether or not they have applied for food stamps.

## 2020-05-29 PROCEDURE — 77412 RADIATION TX DELIVERY LVL 3: CPT | Performed by: RADIOLOGY

## 2020-05-29 PROCEDURE — 77417 THER RADIOLOGY PORT IMAGE(S): CPT | Performed by: RADIOLOGY

## 2020-06-01 ENCOUNTER — HOSPITAL ENCOUNTER (OUTPATIENT)
Dept: RADIATION THERAPY | Facility: HOSPITAL | Age: 65
Discharge: HOME | End: 2020-06-01
Attending: RADIOLOGY
Payer: MEDICAID

## 2020-06-01 PROCEDURE — 77412 RADIATION TX DELIVERY LVL 3: CPT | Performed by: RADIOLOGY

## 2020-06-02 ENCOUNTER — DOCUMENTATION ONLY (OUTPATIENT)
Dept: RADIATION ONCOLOGY | Facility: CLINIC | Age: 65
End: 2020-06-02

## 2020-06-02 PROCEDURE — 77412 RADIATION TX DELIVERY LVL 3: CPT | Performed by: RADIOLOGY

## 2020-06-04 ENCOUNTER — DOCUMENTATION ONLY (OUTPATIENT)
Dept: HEMATOLOGY/ONCOLOGY | Facility: CLINIC | Age: 65
End: 2020-06-04

## 2020-06-04 PROCEDURE — 77412 RADIATION TX DELIVERY LVL 3: CPT | Performed by: RADIOLOGY

## 2020-06-05 PROCEDURE — 77412 RADIATION TX DELIVERY LVL 3: CPT | Performed by: RADIOLOGY

## 2020-06-05 PROCEDURE — 77336 RADIATION PHYSICS CONSULT: CPT | Performed by: RADIOLOGY

## 2020-06-09 PROCEDURE — 77412 RADIATION TX DELIVERY LVL 3: CPT | Performed by: RADIOLOGY

## 2020-06-10 ENCOUNTER — DOCUMENTATION ONLY (OUTPATIENT)
Dept: RADIATION ONCOLOGY | Facility: CLINIC | Age: 65
End: 2020-06-10

## 2020-06-10 PROCEDURE — 77412 RADIATION TX DELIVERY LVL 3: CPT | Performed by: RADIOLOGY

## 2020-06-11 PROCEDURE — 77412 RADIATION TX DELIVERY LVL 3: CPT | Performed by: RADIOLOGY

## 2020-06-12 ENCOUNTER — OFFICE VISIT (OUTPATIENT)
Dept: HEMATOLOGY/ONCOLOGY | Facility: CLINIC | Age: 65
End: 2020-06-12
Payer: MEDICAID

## 2020-06-12 ENCOUNTER — DOCUMENTATION ONLY (OUTPATIENT)
Dept: HEMATOLOGY/ONCOLOGY | Facility: CLINIC | Age: 65
End: 2020-06-12

## 2020-06-12 VITALS
BODY MASS INDEX: 15.84 KG/M2 | SYSTOLIC BLOOD PRESSURE: 137 MMHG | OXYGEN SATURATION: 100 % | WEIGHT: 98.56 LBS | RESPIRATION RATE: 18 BRPM | HEIGHT: 66 IN | TEMPERATURE: 98 F | HEART RATE: 79 BPM | DIASTOLIC BLOOD PRESSURE: 64 MMHG

## 2020-06-12 DIAGNOSIS — Z17.1 CARCINOMA OF AXILLARY TAIL OF RIGHT BREAST IN FEMALE, ESTROGEN RECEPTOR NEGATIVE: Primary | ICD-10-CM

## 2020-06-12 DIAGNOSIS — T45.1X5A ANEMIA ASSOCIATED WITH CHEMOTHERAPY: ICD-10-CM

## 2020-06-12 DIAGNOSIS — D64.81 ANEMIA ASSOCIATED WITH CHEMOTHERAPY: ICD-10-CM

## 2020-06-12 DIAGNOSIS — C50.611 CARCINOMA OF AXILLARY TAIL OF RIGHT BREAST IN FEMALE, ESTROGEN RECEPTOR NEGATIVE: Primary | ICD-10-CM

## 2020-06-12 PROCEDURE — 99214 OFFICE O/P EST MOD 30 MIN: CPT | Mod: PBBFAC | Performed by: INTERNAL MEDICINE

## 2020-06-12 PROCEDURE — 77417 THER RADIOLOGY PORT IMAGE(S): CPT | Performed by: RADIOLOGY

## 2020-06-12 PROCEDURE — 99213 OFFICE O/P EST LOW 20 MIN: CPT | Mod: S$PBB,,, | Performed by: INTERNAL MEDICINE

## 2020-06-12 PROCEDURE — 99999 PR PBB SHADOW E&M-EST. PATIENT-LVL IV: CPT | Mod: PBBFAC,,, | Performed by: INTERNAL MEDICINE

## 2020-06-12 PROCEDURE — 99999 PR PBB SHADOW E&M-EST. PATIENT-LVL IV: ICD-10-PCS | Mod: PBBFAC,,, | Performed by: INTERNAL MEDICINE

## 2020-06-12 PROCEDURE — 77412 RADIATION TX DELIVERY LVL 3: CPT | Performed by: RADIOLOGY

## 2020-06-12 PROCEDURE — 99213 PR OFFICE/OUTPT VISIT, EST, LEVL III, 20-29 MIN: ICD-10-PCS | Mod: S$PBB,,, | Performed by: INTERNAL MEDICINE

## 2020-06-12 NOTE — PROGRESS NOTES
Mr. Rojas, pt's  called to ask the status of pt's [employer] daniel.  Sw explained that she hasn't heard anything from them.  Sw asked Mr. Rojas to call back in one week and explained that if there is no news from [employer] by then SW will call and ask for a status.  Mr. Rojas expressed understanding and agreed.

## 2020-06-12 NOTE — PROGRESS NOTES
Subjective:       Patient ID: Briseyda Rojas is a 64 y.o. female.    Chief Complaint: No chief complaint on file.        Briseyda Rojas presents today for follow-up.  She started radiation in late May and she is not expected to finish until sometime in July.  Of note, on April 3, 2020 she underwent a right mastectomy and AND.  She had DCIS and two residual foci of invasive carcinoma measuring 3 and 5 mm respectively.  Three of 6 lymph nodes were positive.  As mentioned above, she started radiation therapy 2 weeks ago.    Briefly, she  is a 64 y.o. female whom I saw initially on October 22, 2019 for a recently diagnosed triple negative right breast cancer.  She underwent mammography and ultrasound in early September of this year which showed architectural distortion in the right upper lateral breast and pleomorphic calcifications in the right upper mid breast.  Of note, this was her first mammogram ever.  Ultrasound of the axilla was also performed which showed a prominent lymph node.  She underwent biopsy of all 3 lesions. The right upper lateral lesion returned as DCIS with comedo necrosis.  The right upper mid breast came back as normal.  The right axillary lymph node returned as infiltrating ductal carcinoma, ER negative, [place] negative, HER2 equivocal but negative by FISH.  She was been seen by Dr. Raya and she was referred for consideration of neoadjuvant chemotherapy.  She has so far completed 4 cycles of neoadjuvant Adriamycin and cyclophosphamide and 4 cycles of taxol.  She subsequently underwent a right mastectomy with results as above.  Currently receiving XRT    Review of Systems    Overall she feels OK and she has no complaints.  Her ECOG PS is 1.  She denies any anxiety, depression, easy bruising, chills, night  sweats,  fever, constipation, diplopia,  blurred vision, headache, chest pain, palpitations, shortness of breath, left breast pain, abdominal pain, extremity pain, or difficulty ambulating.  The  remainder of the ten-point ROS, including general, skin, lymph, H/N, cardiorespiratory, GI, , Neuro, Endocrine, and psychiatric is negative.     Objective:      Physical Exam      She is alert, oriented to time, place, person, appears anxious but she is in no acute distress.                              VITAL SIGNS:  Reviewed                                      HEENT:  Normal.  There are no nasal, oral, lip, gingival, auricular, or lid lesions.  She does have pterygia on both eyes.  Mucosae are moist and pink, and there is no        thrush.  Pupils are equal, reactive to light and accommodation.              Extraocular muscle movements are intact.  Dentition is poor with several decayed teeth.  There is no frontal or maxillary tenderness.                                     NECK:  Supple without JVD, adenopathy, or thyromegaly.                       LUNGS:  Clear to auscultation without wheezing, rales, or rhonchi.           CARDIOVASCULAR:  Reveals an S1, S2, no murmurs, no rubs, no gallops.         ABDOMEN:  Soft, nontender, without organomegaly.  Bowel sounds are    present.                                                                     EXTREMITIES:  No cyanosis, clubbing, or edema.                               BREASTS:   she is now status post right mastectomy.  Her incision has healed nicely and there is no evidence for chest wall recurrence.       A left sided portacath is again identified.      There are no masses in the left breast.                             LYMPHATIC:  There is no cervical, left axillary, or supraclavicular adenopathy.   SKIN:  Warm and moist, without petechiae, rashes, induration, or ecchymoses.           NEUROLOGIC:  DTRs are 0-1+ bilaterally, symmetrical, motor function is 5/5,  and cranial nerves are  within normal limits.    Assessment:       1. Triple negative breast cancer, s/p neoadjuvant chemotherapy, status post mastectomy with significant residual disease   2.  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  Patient is on apixaban.         Plan:       I had a long discussion with her.  We will not start adjuvant capecitabine until after she completes her radiation.  Therefore, I have asked her to return in see me in mid July.  Her multiple questions were answered to her satisfaction.

## 2020-06-15 PROCEDURE — 77336 RADIATION PHYSICS CONSULT: CPT | Performed by: RADIOLOGY

## 2020-06-15 PROCEDURE — 77412 RADIATION TX DELIVERY LVL 3: CPT | Performed by: RADIOLOGY

## 2020-06-17 PROCEDURE — 77412 RADIATION TX DELIVERY LVL 3: CPT | Performed by: RADIOLOGY

## 2020-06-18 PROCEDURE — 77387 GUIDANCE FOR RADJ TX DLVR: CPT | Mod: TC | Performed by: RADIOLOGY

## 2020-06-18 PROCEDURE — 77412 RADIATION TX DELIVERY LVL 3: CPT | Performed by: RADIOLOGY

## 2020-06-19 ENCOUNTER — DOCUMENTATION ONLY (OUTPATIENT)
Dept: ONCOLOGY | Facility: HOSPITAL | Age: 65
End: 2020-06-19

## 2020-06-19 PROCEDURE — 77412 RADIATION TX DELIVERY LVL 3: CPT | Performed by: RADIOLOGY

## 2020-06-19 NOTE — PROGRESS NOTES
Pt's  called PROMISE to follow up on daniel Promise applied for to help family with finances.  ([employer] [phone]).  After--PROMISE called [employer] and left a message asking for a status.  Called Mr. Rojas back and explained that Promise would call him as soon as she hears back from agency.

## 2020-06-23 ENCOUNTER — DOCUMENTATION ONLY (OUTPATIENT)
Dept: RADIATION ONCOLOGY | Facility: CLINIC | Age: 65
End: 2020-06-23

## 2020-06-23 PROCEDURE — 77412 RADIATION TX DELIVERY LVL 3: CPT | Performed by: RADIOLOGY

## 2020-06-24 ENCOUNTER — DOCUMENTATION ONLY (OUTPATIENT)
Dept: HEMATOLOGY/ONCOLOGY | Facility: CLINIC | Age: 65
End: 2020-06-24

## 2020-06-24 NOTE — PROGRESS NOTES
SW received email from Emili Pascual, [employer], which said that the committee did not meet in June to review applications but will meet the second week in July.  Information about this pt's application will be forthcoming.  SW will call pt to inform her of this.

## 2020-06-29 PROCEDURE — 77412 RADIATION TX DELIVERY LVL 3: CPT | Performed by: RADIOLOGY

## 2020-06-30 ENCOUNTER — DOCUMENTATION ONLY (OUTPATIENT)
Dept: RADIATION ONCOLOGY | Facility: CLINIC | Age: 65
End: 2020-06-30

## 2020-06-30 PROCEDURE — 77412 RADIATION TX DELIVERY LVL 3: CPT | Performed by: RADIOLOGY

## 2020-06-30 PROCEDURE — 77336 RADIATION PHYSICS CONSULT: CPT | Performed by: RADIOLOGY

## 2020-06-30 PROCEDURE — 77417 THER RADIOLOGY PORT IMAGE(S): CPT | Performed by: RADIOLOGY

## 2020-06-30 NOTE — PLAN OF CARE
Pt. On day 21 of outpt. Xrt to right chest wall.  Hyperpigmentation.  Using cream.  C/o right shoulder ROM tightness.  Denies pain. C/o some itchiness.  Suggested cortisone cream.

## 2020-07-01 ENCOUNTER — HOSPITAL ENCOUNTER (OUTPATIENT)
Dept: RADIATION THERAPY | Facility: HOSPITAL | Age: 65
Discharge: HOME | End: 2020-07-01
Attending: RADIOLOGY
Payer: MEDICAID

## 2020-07-01 PROCEDURE — 77412 RADIATION TX DELIVERY LVL 3: CPT | Performed by: RADIOLOGY

## 2020-07-06 PROCEDURE — 77412 RADIATION TX DELIVERY LVL 3: CPT | Performed by: RADIOLOGY

## 2020-07-07 ENCOUNTER — DOCUMENTATION ONLY (OUTPATIENT)
Dept: RADIATION ONCOLOGY | Facility: CLINIC | Age: 65
End: 2020-07-07

## 2020-07-07 PROCEDURE — 77412 RADIATION TX DELIVERY LVL 3: CPT | Performed by: RADIOLOGY

## 2020-07-07 NOTE — PLAN OF CARE
Pt. On day 24 of outpt. Xrt to chest wall.  Pt. With hyperpigmentation.  Denies pain.  C/o itching.  Cream helping.  +fatigue.

## 2020-07-08 ENCOUNTER — DOCUMENTATION ONLY (OUTPATIENT)
Dept: HEMATOLOGY/ONCOLOGY | Facility: CLINIC | Age: 65
End: 2020-07-08

## 2020-07-08 NOTE — PROGRESS NOTES
Ms. Steven from [employer] called PROMISE to say that  was at pt's house to pick her up for radiation and pt was not home, nor was she answering her cell phone.  Promise called pt's home, her  answered.  He said he had just gotten home from a job and she is not at home however her cell phone and house keys are there.  He said he does not know where she is.   PROMISE called Ms. Steven back and explained that [employer] needs to call pt to make sure she is there before going back to her home to pick her up.  She agreed.

## 2020-07-09 ENCOUNTER — DOCUMENTATION ONLY (OUTPATIENT)
Dept: HEMATOLOGY/ONCOLOGY | Facility: CLINIC | Age: 65
End: 2020-07-09

## 2020-07-09 DIAGNOSIS — I10 ESSENTIAL HYPERTENSION: Primary | ICD-10-CM

## 2020-07-09 PROCEDURE — 77336 RADIATION PHYSICS CONSULT: CPT | Performed by: RADIOLOGY

## 2020-07-09 PROCEDURE — 77412 RADIATION TX DELIVERY LVL 3: CPT | Performed by: RADIOLOGY

## 2020-07-10 PROCEDURE — 77334 PR  RADN TREATMENT AID(S) COMPLX: ICD-10-PCS | Mod: 26,,, | Performed by: RADIOLOGY

## 2020-07-10 PROCEDURE — 77321 SPECIAL TELETX PORT PLAN: CPT | Mod: TC | Performed by: RADIOLOGY

## 2020-07-10 PROCEDURE — 77412 RADIATION TX DELIVERY LVL 3: CPT | Performed by: RADIOLOGY

## 2020-07-10 PROCEDURE — 77321 SPECIAL TELETX PORT PLAN: CPT | Mod: 26,,, | Performed by: RADIOLOGY

## 2020-07-10 PROCEDURE — 77321 PR  TELETHER ISO-PORT PLAN: ICD-10-PCS | Mod: 26,,, | Performed by: RADIOLOGY

## 2020-07-10 PROCEDURE — 77334 RADIATION TREATMENT AID(S): CPT | Mod: 26,,, | Performed by: RADIOLOGY

## 2020-07-10 PROCEDURE — 77334 RADIATION TREATMENT AID(S): CPT | Mod: TC | Performed by: RADIOLOGY

## 2020-07-13 ENCOUNTER — TELEPHONE (OUTPATIENT)
Dept: HEMATOLOGY/ONCOLOGY | Facility: CLINIC | Age: 65
End: 2020-07-13

## 2020-07-13 NOTE — TELEPHONE ENCOUNTER
Patient is requesting a refill on lisinopril / hctz 20-25mg tab      Sig:  Take 1 tab once a day .  I don't see this specific dosage request in rx selection .    Pharmacy is [facility]   [id number]    Could you assist with ordering and e-scribe to [facility] .    Thanks

## 2020-07-14 RX ORDER — LISINOPRIL AND HYDROCHLOROTHIAZIDE 12.5; 2 MG/1; MG/1
1 TABLET ORAL DAILY
Qty: 30 TABLET | Refills: 11 | OUTPATIENT
Start: 2020-07-14 | End: 2021-07-14

## 2020-07-17 ENCOUNTER — TELEPHONE (OUTPATIENT)
Dept: HEMATOLOGY/ONCOLOGY | Facility: CLINIC | Age: 65
End: 2020-07-17

## 2020-07-17 ENCOUNTER — TELEPHONE (OUTPATIENT)
Dept: RADIATION ONCOLOGY | Facility: CLINIC | Age: 65
End: 2020-07-17

## 2020-07-17 ENCOUNTER — DOCUMENTATION ONLY (OUTPATIENT)
Dept: RADIATION ONCOLOGY | Facility: CLINIC | Age: 65
End: 2020-07-17

## 2020-07-17 NOTE — TELEPHONE ENCOUNTER
LM for pt. On 2 phone #, home and [phone].  Pt.to return call to discuss remaining xrt treatments.

## 2020-07-17 NOTE — TELEPHONE ENCOUNTER
Pt.'s  called to arrange transportation for Monday.  He was unclear why she had missed all appts. This week for radiation.  Sent note to  to assist with resetting up transportation.  Pt.'s  requested blood pressure medications for Ms. Rojas.  This RN instructed the patient to contact their pcp for refills on their medications. Pt.'s  agreed to same.

## 2020-07-17 NOTE — PROGRESS NOTES
Call received from Tati (RN-radiation oncology) re: pts. Transportation to radiation appts. Per Tati pt. Has not arrived to any of her treatments this week. Attempted to contact pt., spoke with her  who states that pt. Not home but that he would take a message. Informed pts. Spouse that pt. To come for radiation appt. Starting Monday (7/20/20) for her remaining 10 treatments. Spouse states that he will inform his wife that she will be ready for  by [place] @ 12:30 pm. Request made to [employer] for  from 7/20/20 to 7/31/20 excluding weekends.  for 12:30. Faxed referral to [facility] @ [phone]. No other needs identified.

## 2020-07-19 ENCOUNTER — HOSPITAL ENCOUNTER (INPATIENT)
Facility: OTHER | Age: 65
LOS: 2 days | Discharge: HOME | DRG: 390 | End: 2020-07-21
Attending: INTERNAL MEDICINE | Admitting: INTERNAL MEDICINE
Payer: MEDICAID

## 2020-07-19 DIAGNOSIS — K56.600 PARTIAL SMALL BOWEL OBSTRUCTION: ICD-10-CM

## 2020-07-19 DIAGNOSIS — K56.600 SMALL BOWEL OBSTRUCTION, PARTIAL: Primary | ICD-10-CM

## 2020-07-19 PROBLEM — I16.0 HYPERTENSIVE URGENCY: Status: ACTIVE | Noted: 2020-07-19

## 2020-07-19 PROCEDURE — 99223 1ST HOSP IP/OBS HIGH 75: CPT | Mod: ,,, | Performed by: NURSE PRACTITIONER

## 2020-07-19 PROCEDURE — 63600175 PHARM REV CODE 636 W HCPCS: Performed by: NURSE PRACTITIONER

## 2020-07-19 PROCEDURE — 11000001 HC ACUTE MED/SURG PRIVATE ROOM

## 2020-07-19 PROCEDURE — 99223 PR INITIAL HOSPITAL CARE,LEVL III: ICD-10-PCS | Mod: ,,, | Performed by: NURSE PRACTITIONER

## 2020-07-19 PROCEDURE — 25000003 PHARM REV CODE 250: Performed by: NURSE PRACTITIONER

## 2020-07-19 RX ORDER — MORPHINE SULFATE 2 MG/ML
2 INJECTION, SOLUTION INTRAMUSCULAR; INTRAVENOUS EVERY 4 HOURS PRN
Status: DISCONTINUED | OUTPATIENT
Start: 2020-07-19 | End: 2020-07-21 | Stop reason: HOSPADM

## 2020-07-19 RX ORDER — HYDRALAZINE HYDROCHLORIDE 20 MG/ML
10 INJECTION INTRAMUSCULAR; INTRAVENOUS EVERY 8 HOURS PRN
Status: DISCONTINUED | OUTPATIENT
Start: 2020-07-19 | End: 2020-07-21 | Stop reason: HOSPADM

## 2020-07-19 RX ORDER — SODIUM CHLORIDE 9 MG/ML
INJECTION, SOLUTION INTRAVENOUS CONTINUOUS
Status: DISCONTINUED | OUTPATIENT
Start: 2020-07-19 | End: 2020-07-21

## 2020-07-19 RX ORDER — ONDANSETRON 8 MG/1
8 TABLET, ORALLY DISINTEGRATING ORAL EVERY 8 HOURS PRN
Status: DISCONTINUED | OUTPATIENT
Start: 2020-07-19 | End: 2020-07-21 | Stop reason: HOSPADM

## 2020-07-19 RX ORDER — SODIUM CHLORIDE 0.9 % (FLUSH) 0.9 %
10 SYRINGE (ML) INJECTION
Status: DISCONTINUED | OUTPATIENT
Start: 2020-07-19 | End: 2020-07-21 | Stop reason: HOSPADM

## 2020-07-19 RX ADMIN — HYDRALAZINE HYDROCHLORIDE 10 MG: 20 INJECTION INTRAMUSCULAR; INTRAVENOUS at 09:07

## 2020-07-19 RX ADMIN — SODIUM CHLORIDE: 0.9 INJECTION, SOLUTION INTRAVENOUS at 09:07

## 2020-07-19 RX ADMIN — MORPHINE SULFATE 2 MG: 2 INJECTION, SOLUTION INTRAMUSCULAR; INTRAVENOUS at 10:07

## 2020-07-20 ENCOUNTER — DOCUMENTATION ONLY (OUTPATIENT)
Dept: RADIATION ONCOLOGY | Facility: CLINIC | Age: 65
End: 2020-07-20

## 2020-07-20 LAB
ALBUMIN SERPL BCP-MCNC: 3.6 G/DL (ref 3.5–5.2)
ALP SERPL-CCNC: 81 U/L (ref 55–135)
ALT SERPL W/O P-5'-P-CCNC: 32 U/L (ref 10–44)
ANION GAP SERPL CALC-SCNC: 14 MMOL/L (ref 8–16)
AST SERPL-CCNC: 33 U/L (ref 10–40)
BASOPHILS # BLD AUTO: 0.03 K/UL (ref 0–0.2)
BASOPHILS NFR BLD: 0.5 % (ref 0–1.9)
BILIRUB SERPL-MCNC: 0.4 MG/DL (ref 0.1–1)
BUN SERPL-MCNC: 9 MG/DL (ref 8–23)
CALCIUM SERPL-MCNC: 9.8 MG/DL (ref 8.7–10.5)
CHLORIDE SERPL-SCNC: 104 MMOL/L (ref 95–110)
CO2 SERPL-SCNC: 20 MMOL/L (ref 23–29)
CREAT SERPL-MCNC: 0.9 MG/DL (ref 0.5–1.4)
DIFFERENTIAL METHOD: ABNORMAL
EOSINOPHIL # BLD AUTO: 0.3 K/UL (ref 0–0.5)
EOSINOPHIL NFR BLD: 4.6 % (ref 0–8)
ERYTHROCYTE [DISTWIDTH] IN BLOOD BY AUTOMATED COUNT: 15.3 % (ref 11.5–14.5)
EST. GFR  (AFRICAN AMERICAN): >60 ML/MIN/1.73 M^2
EST. GFR  (NON AFRICAN AMERICAN): >60 ML/MIN/1.73 M^2
GLUCOSE SERPL-MCNC: 91 MG/DL (ref 70–110)
HCT VFR BLD AUTO: 38.5 % (ref 37–48.5)
HGB BLD-MCNC: 12.2 G/DL (ref 12–16)
IMM GRANULOCYTES # BLD AUTO: 0.01 K/UL (ref 0–0.04)
IMM GRANULOCYTES NFR BLD AUTO: 0.2 % (ref 0–0.5)
LYMPHOCYTES # BLD AUTO: 0.6 K/UL (ref 1–4.8)
LYMPHOCYTES NFR BLD: 11.4 % (ref 18–48)
MAGNESIUM SERPL-MCNC: 1.8 MG/DL (ref 1.6–2.6)
MCH RBC QN AUTO: 28.6 PG (ref 27–31)
MCHC RBC AUTO-ENTMCNC: 31.7 G/DL (ref 32–36)
MCV RBC AUTO: 90 FL (ref 82–98)
MONOCYTES # BLD AUTO: 0.5 K/UL (ref 0.3–1)
MONOCYTES NFR BLD: 9.1 % (ref 4–15)
NEUTROPHILS # BLD AUTO: 4.2 K/UL (ref 1.8–7.7)
NEUTROPHILS NFR BLD: 74.2 % (ref 38–73)
NRBC BLD-RTO: 0 /100 WBC
PHOSPHATE SERPL-MCNC: 3.3 MG/DL (ref 2.7–4.5)
PLATELET # BLD AUTO: 249 K/UL (ref 150–350)
PMV BLD AUTO: 9.4 FL (ref 9.2–12.9)
POTASSIUM SERPL-SCNC: 3.4 MMOL/L (ref 3.5–5.1)
PROT SERPL-MCNC: 7.7 G/DL (ref 6–8.4)
RBC # BLD AUTO: 4.27 M/UL (ref 4–5.4)
SODIUM SERPL-SCNC: 138 MMOL/L (ref 136–145)
WBC # BLD AUTO: 5.6 K/UL (ref 3.9–12.7)

## 2020-07-20 PROCEDURE — 84100 ASSAY OF PHOSPHORUS: CPT

## 2020-07-20 PROCEDURE — 83735 ASSAY OF MAGNESIUM: CPT

## 2020-07-20 PROCEDURE — 25000003 PHARM REV CODE 250: Performed by: INTERNAL MEDICINE

## 2020-07-20 PROCEDURE — 85025 COMPLETE CBC W/AUTO DIFF WBC: CPT

## 2020-07-20 PROCEDURE — 80053 COMPREHEN METABOLIC PANEL: CPT

## 2020-07-20 PROCEDURE — 36415 COLL VENOUS BLD VENIPUNCTURE: CPT

## 2020-07-20 PROCEDURE — 99233 SBSQ HOSP IP/OBS HIGH 50: CPT | Mod: ,,, | Performed by: INTERNAL MEDICINE

## 2020-07-20 PROCEDURE — 11000001 HC ACUTE MED/SURG PRIVATE ROOM

## 2020-07-20 PROCEDURE — 99233 PR SUBSEQUENT HOSPITAL CARE,LEVL III: ICD-10-PCS | Mod: ,,, | Performed by: INTERNAL MEDICINE

## 2020-07-20 PROCEDURE — 63600175 PHARM REV CODE 636 W HCPCS: Performed by: NURSE PRACTITIONER

## 2020-07-20 RX ORDER — NAPROXEN SODIUM 220 MG/1
81 TABLET, FILM COATED ORAL DAILY
Status: DISCONTINUED | OUTPATIENT
Start: 2020-07-20 | End: 2020-07-21 | Stop reason: HOSPADM

## 2020-07-20 RX ORDER — CARVEDILOL 12.5 MG/1
12.5 TABLET ORAL 2 TIMES DAILY
Status: DISCONTINUED | OUTPATIENT
Start: 2020-07-20 | End: 2020-07-21 | Stop reason: HOSPADM

## 2020-07-20 RX ORDER — LISINOPRIL 20 MG/1
20 TABLET ORAL DAILY
Status: DISCONTINUED | OUTPATIENT
Start: 2020-07-20 | End: 2020-07-21 | Stop reason: HOSPADM

## 2020-07-20 RX ORDER — HYDRALAZINE HYDROCHLORIDE 20 MG/ML
10 INJECTION INTRAMUSCULAR; INTRAVENOUS ONCE
Status: DISCONTINUED | OUTPATIENT
Start: 2020-07-20 | End: 2020-07-20

## 2020-07-20 RX ORDER — HYDRALAZINE HYDROCHLORIDE 20 MG/ML
10 INJECTION INTRAMUSCULAR; INTRAVENOUS ONCE
Status: COMPLETED | OUTPATIENT
Start: 2020-07-20 | End: 2020-07-20

## 2020-07-20 RX ADMIN — HYDRALAZINE HYDROCHLORIDE 10 MG: 20 INJECTION INTRAMUSCULAR; INTRAVENOUS at 05:07

## 2020-07-20 RX ADMIN — ASPIRIN 81 MG 81 MG: 81 TABLET ORAL at 08:07

## 2020-07-20 RX ADMIN — SODIUM CHLORIDE: 0.9 INJECTION, SOLUTION INTRAVENOUS at 09:07

## 2020-07-20 RX ADMIN — CARVEDILOL 12.5 MG: 12.5 TABLET, FILM COATED ORAL at 09:07

## 2020-07-20 NOTE — NURSING
Patient refusing to start IV access and answer admission questions. Zen Wolff NP notified of current B/P 165/90 and that IVFs is stopped due to patient refusing another IV access, no new orders given. Charge Nurse notified.

## 2020-07-20 NOTE — HPI
The patient is a 65 yo female who presented to [facility] with 1 week of abdominal pain and emesis.  Upon diagnostic workup, the patient was found to have partial small bowel obstruction.  She was transferred to [facility] [place] for General Surgery consult.

## 2020-07-20 NOTE — CONSULTS
[facility]  Consult      Date of Consultation:7/20/2020    History of Present Illness:  This 64-year-old black female presented to the [facility] with a 1 week history of abdominal pain, nausea and vomiting.  Workup suggested a possible small-bowel obstruction.  They did not have services to treat this simple problem.  She was transferred to [facility].  Today she is feeling better and has passed flatus.  Also states that she is hungry.    Past Surgical History:   Procedure Laterality Date    HYSTERECTOMY      INSERTION OF VENOUS ACCESS PORT Left 10/30/2019    Procedure: INSERTION, VENOUS ACCESS PORT;  Surgeon: Kraig Kahn Jr., MD;  Location: [facility] OR;  Service: General;  Laterality: Left;  Left internal jugular Port-A-Cath Placement    MODIFIED RADICAL MASTECTOMY W/ AXILLARY LYMPH NODE DISSECTION Right 4/3/2020    Procedure: MASTECTOMY, MODIFIED RADICAL;  Surgeon: Mireille Raya MD;  Location: [facility];  Service: General;  Laterality: Right;    WRIST FRACTURE SURGERY Bilateral           Physical Exam:  In no acute distress.  Afebrile  Chest clear  Heart rate rhythm regular  Abdomen soft nontender nondistended  Extremities no edema    Impression:  Benign abdomen.  Doubt mechanical obstruction    Recommend:  NG tube DC'd and will place on clear liquids.    Thank you for the opportunity of seeing Briseyda Rojas in consultation

## 2020-07-20 NOTE — NURSING
Patient arrived to floor. Patient oriented to room, call light placed within reach, and bed low and locked. Patient refusing SCDs and refusing to answer admission questions at this time, patient inattentive and non communicative.  Will continue to monitor. Charge Nurse and Zen Wolff NP notified.

## 2020-07-20 NOTE — SUBJECTIVE & OBJECTIVE
Past Medical History:   Diagnosis Date    Anemia     Anticoagulant long-term use     Breast cancer     History of chemotherapy     Hypertension     Port-A-Cath in place     Thrombus     around port       Past Surgical History:   Procedure Laterality Date    HYSTERECTOMY      INSERTION OF VENOUS ACCESS PORT Left 10/30/2019    Procedure: INSERTION, VENOUS ACCESS PORT;  Surgeon: Kraig Kahn Jr., MD;  Location: [facility] OR;  Service: General;  Laterality: Left;  Left internal jugular Port-A-Cath Placement    MODIFIED RADICAL MASTECTOMY W/ AXILLARY LYMPH NODE DISSECTION Right 4/3/2020    Procedure: MASTECTOMY, MODIFIED RADICAL;  Surgeon: Mireille Raya MD;  Location: [facility] OR;  Service: General;  Laterality: Right;    WRIST FRACTURE SURGERY Bilateral        Review of patient's allergies indicates:  No Known Allergies    Current Facility-Administered Medications on File Prior to Encounter   Medication    [COMPLETED] diazePAM injection 2.5 mg    [COMPLETED] diphenhydrAMINE injection 12.5 mg    [COMPLETED] iohexoL (OMNIPAQUE 350) injection 75 mL    [COMPLETED] lidocaine (PF) 40 mg/mL (4 %) injection 2 mL    [COMPLETED] metoclopramide HCl injection 10 mg    [COMPLETED] morphine injection 4 mg    [COMPLETED] sodium chloride 0.9% bolus 500 mL     Current Outpatient Medications on File Prior to Encounter   Medication Sig    apixaban (ELIQUIS) 5 mg Tab Take 10 mg twice a day for 1 week followed by 5 mg twice a day.    aspirin 81 MG Chew Take 1 tablet (81 mg total) by mouth once daily.    carvedilol (COREG) 12.5 MG tablet Take 1 tablet (12.5 mg total) by mouth 2 (two) times daily.    cyproheptadine (PERIACTIN) 4 mg tablet Take 1 tablet (4 mg total) by mouth 2 (two) times daily as needed (for appetite).    gabapentin (NEURONTIN) 300 MG capsule Take 1 capsule (300 mg total) by mouth 3 (three) times daily.    HYDROcodone-acetaminophen (NORCO) 5-325 mg per tablet Take 1 tablet by mouth every 6 (six) hours  as needed for Pain.    ibuprofen (ADVIL,MOTRIN) 800 MG tablet Take 800 mg by mouth every 8 (eight) hours as needed.    lisinopril (PRINIVIL,ZESTRIL) 20 MG tablet Take 1 tablet (20 mg total) by mouth once daily.    megestrol (MEGACE) 400 mg/10 mL (40 mg/mL) Susp Take 10 mLs (400 mg total) by mouth once daily.    ondansetron (ZOFRAN) 8 MG tablet Take on every 12 hours x 3 days post chemotherapy, and then one every 12 hours as needed for nausea.    oxyCODONE (ROXICODONE) 5 MG immediate release tablet Take 1 every 6 hour as needed for pain     Family History     Problem Relation (Age of Onset)    Breast cancer Maternal Grandmother    Ovarian cancer Mother        Tobacco Use    Smoking status: Current Every Day Smoker     Packs/day: 0.50     Years: 46.00     Pack years: 23.00    Smokeless tobacco: Current User    Tobacco comment: smoking few cig per day now, smoking cessation packet  given & discussed   Substance and Sexual Activity    Alcohol use: No    Drug use: No    Sexual activity: Not on file     Review of Systems   Unable to perform ROS: Other (patient refusing)     Objective:     Vital Signs (Most Recent):  Temp: 98.3 °F (36.8 °C) (07/19/20 1942)  Pulse: 63 (07/19/20 1942)  Resp: 20 (07/19/20 1942)  BP: (!) 193/97 (07/19/20 1942)  SpO2: 100 % (07/19/20 1942) Vital Signs (24h Range):  Temp:  [98.3 °F (36.8 °C)-98.5 °F (36.9 °C)] 98.3 °F (36.8 °C)  Pulse:  [63-69] 63  Resp:  [16-20] 20  SpO2:  [100 %] 100 %  BP: (162-226)/() 193/97        There is no height or weight on file to calculate BMI.    Physical Exam  Constitutional:       Appearance: She is well-developed.   HENT:      Head: Normocephalic.   Eyes:      General:         Right eye: No discharge.         Left eye: No discharge.      Conjunctiva/sclera: Conjunctivae normal.   Neck:      Musculoskeletal: Normal range of motion and neck supple.   Cardiovascular:      Rate and Rhythm: Normal rate and regular rhythm.      Pulses:           Radial  pulses are 2+ on the right side and 2+ on the left side.      Heart sounds: Normal heart sounds.   Pulmonary:      Effort: Pulmonary effort is normal. No respiratory distress.      Breath sounds: Examination of the right-lower field reveals decreased breath sounds. Examination of the left-lower field reveals decreased breath sounds. Decreased breath sounds present.   Abdominal:      General: Bowel sounds are increased. There is no distension.      Palpations: Abdomen is soft.      Tenderness: There is no abdominal tenderness.   Musculoskeletal: Normal range of motion.   Skin:     General: Skin is warm and dry.   Neurological:      Mental Status: She is lethargic.      GCS: GCS eye subscore is 2. GCS motor subscore is 5.   Psychiatric:         Attention and Perception: She is inattentive.         Mood and Affect: Affect is flat.         Speech: She is noncommunicative.         Behavior: Behavior is withdrawn.             Significant Labs:   CBC:   Recent Labs   Lab 07/19/20  1246   WBC 5.67   HGB 12.2   HCT 38.6        CMP:   Recent Labs   Lab 07/19/20  1246      K 3.8      CO2 24   *   BUN 16   CREATININE 0.93   CALCIUM 10.2   PROT 8.4   ALBUMIN 4.7   BILITOT 0.5   ALKPHOS 88   AST 29   ALT 17   ANIONGAP 11   EGFRNONAA >60.0       Significant Imaging: I have reviewed all pertinent imaging results/findings within the past 24 hours.

## 2020-07-20 NOTE — PROGRESS NOTES
[facility] Hospital Medicine  Progress Note    Patient Name: Briseyda Rojas  MRN: [id number]  Patient Class: IP- Inpatient   Admission Date: 7/19/2020  Length of Stay: 1 days  Attending Physician: Enid Espinosa MD  Primary Care Provider: Merle Cunningham NP (Inactive)        Subjective:     Principal Problem:Partial small bowel obstruction        HPI:  The patient is a 63 yo female who presented to [facility] with 1 week of abdominal pain and emesis.  Upon diagnostic workup, the patient was found to have partial small bowel obstruction.  She was transferred to [facility] for General Surgery consult.    Overview/Hospital Course:  No notes on file    Interval History: Pt agitated overnight. She won't open eyes during my evaluation and will only shake head yes/no to questions. Denies abdominal pain, N/V. + flatus but no BM.    Review of Systems   Constitutional: Negative for chills and fever.   Respiratory: Negative for shortness of breath.    Cardiovascular: Negative for leg swelling.   Gastrointestinal: Negative for abdominal pain, nausea and vomiting.     Objective:     Vital Signs (Most Recent):  Temp: 99.4 °F (37.4 °C) (07/20/20 0710)  Pulse: 98 (07/20/20 1000)  Resp: 18 (07/20/20 0710)  BP: 108/67 (07/20/20 0853)  SpO2: 98 % (07/20/20 0710) Vital Signs (24h Range):  Temp:  [97.6 °F (36.4 °C)-99.4 °F (37.4 °C)] 99.4 °F (37.4 °C)  Pulse:  [60-98] 98  Resp:  [16-20] 18  SpO2:  [98 %-100 %] 98 %  BP: (108-226)/() 108/67     Weight: 48.5 kg (106 lb 14.8 oz)  Body mass index is 17.26 kg/m².    Intake/Output Summary (Last 24 hours) at 7/20/2020 1156  Last data filed at 7/20/2020 0534  Gross per 24 hour   Intake 0 ml   Output 1200 ml   Net -1200 ml      Physical Exam  Vitals signs and nursing note reviewed.   Constitutional:       General: She is not in acute distress.     Appearance: Normal appearance. She is well-developed.   Cardiovascular:      Rate and Rhythm: Normal rate and  regular rhythm.      Heart sounds: Normal heart sounds.   Pulmonary:      Effort: Pulmonary effort is normal.      Breath sounds: Normal breath sounds.   Abdominal:      General: Bowel sounds are normal.      Palpations: Abdomen is soft.      Tenderness: There is no abdominal tenderness.   Musculoskeletal: Normal range of motion.      Right lower leg: No edema.      Left lower leg: No edema.   Skin:     General: Skin is warm and dry.   Neurological:      Comments: Drowsy, awakens to voice but won't answer questions except with nodding.         Significant Labs:   CBC:   Recent Labs   Lab 07/19/20  1246 07/20/20  0417   WBC 5.67 5.60   HGB 12.2 12.2   HCT 38.6 38.5    249     CMP:   Recent Labs   Lab 07/19/20  1246 07/20/20  0417    138   K 3.8 3.4*    104   CO2 24 20*   * 91   BUN 16 9   CREATININE 0.93 0.9   CALCIUM 10.2 9.8   PROT 8.4 7.7   ALBUMIN 4.7 3.6   BILITOT 0.5 0.4   ALKPHOS 88 81   AST 29 33   ALT 17 32   ANIONGAP 11 14   EGFRNONAA >60.0 >60     All pertinent labs within the past 24 hours have been reviewed.    Significant Imaging: I have reviewed and interpreted all pertinent imaging results/findings within the past 24 hours.      Assessment/Plan:      * Partial small bowel obstruction  CT abd/pelvis shows prominent small bowel loops which could relate to developing obstruction vs. ileus  NGT in place  Continue NPO, IVF  General Surgery consulted  Pain control with IV morphine      Breast cancer, right breast  S/p modified radical mastectomy April 2020  Currently undergoing radiation and chemotherapy will follow      Essential hypertension  Uncontrolled  Resume coreg 12.5 mg BID and lisinopril 20 mg qd with hold parameters  Hydralazine PRN for SBP > 170      Stage 3 chronic kidney disease  Baseline Cr around 1.0  Currently stable, monitor        VTE Risk Mitigation (From admission, onward)         Ordered     IP VTE HIGH RISK PATIENT  Once      07/19/20 [facility]     Place  sequential compression device  Until discontinued      07/19/20 1950     Reason for No Pharmacological VTE Prophylaxis  Once     Question:  Reasons:  Answer:  Already adequately anticoagulated on oral Anticoagulants    07/19/20 1950                      Enid Espinosa MD  Department of Hospital Medicine   [facility]

## 2020-07-20 NOTE — PROGRESS NOTES
Notified [facility] ([phone]) to put transportation on hold until further notice, pt. Currently admitted into the hospital.

## 2020-07-20 NOTE — NURSING
B/P 198/96, Zen Wolff NP notified. New telephone order given for Hydralazine 10mg IV once now. Telephone order read back.

## 2020-07-20 NOTE — ASSESSMENT & PLAN NOTE
CT abd/pelvis shows prominent small bowel loops which could relate to developing obstruction vs. ileus  NGT in place  Continue NPO, IVF  General Surgery consulted  Pain control with IV morphine

## 2020-07-20 NOTE — PLAN OF CARE
LMSW met with the patient the bedside. Patient is resting.     LMSW returned to the bedside when her  Neymar Rojas [phone] was present.    Patients face sheet information is correct. Prior to admissions patient was independent with ADLS. Patient has been having trouble getting her blood pressure medications. Per  the patient hasn't had a script for it for 3 months and he has tried to contacted her PCP Dr. Adina Alvarez and the patients other Md's. No prescription provided.     Patient needs her radiology appointment rescheduled and the cab service to be updated. LMSW will arrange.     CM team to continue to follow and assist with medication.          07/20/20 1212   Discharge Assessment   Assessment Type Discharge Planning Assessment   Confirmed/corrected address and phone number on facesheet? Yes   Assessment information obtained from? Caregiver   Communicated expected length of stay with patient/caregiver no   Prior to hospitilization cognitive status: Alert/Oriented   Prior to hospitalization functional status: Independent   Current cognitive status: Alert/Oriented   Current Functional Status: Independent   Lives With spouse   Able to Return to Prior Arrangements yes   Is patient able to care for self after discharge? Yes   Patient's perception of discharge disposition home or selfcare   Readmission Within the Last 30 Days no previous admission in last 30 days   Patient currently being followed by outpatient case management? No   Patient currently receives any other outside agency services? No   Equipment Currently Used at Home none   Do you have any problems affording any of your prescribed medications? No   Does the patient have transportation home? Yes   Transportation Anticipated family or friend will provide   Does the patient receive services at the Coumadin Clinic? No   Discharge Plan A Home   DME Needed Upon Discharge  none   Patient/Family in Agreement with Plan yes

## 2020-07-20 NOTE — NURSING
"Attempt to start IV access on patient, unsuccessful. Charge Nurse notified. Charge Nurse attempt to start IV access, unsuccessful, patient stated "stop sticking me".  "

## 2020-07-20 NOTE — H&P
[facility] Hospital Medicine  History & Physical    Patient Name: Briseyda Rojas  MRN: [id number]  Admission Date: 7/19/2020  Attending Physician: Enid Espinosa MD   Primary Care Provider: Merle Cunningham NP (Inactive)         Patient information was obtained from patient, past medical records and ER records.     Subjective:     Principal Problem:Partial small bowel obstruction    Chief Complaint: No chief complaint on file.       HPI: The patient is a 65 yo female who presented to [facility] with 1 week of abdominal pain and emesis.  Upon diagnostic workup, the patient was found to have partial small bowel obstruction.  She was transferred to [facility] for General Surgery consult.    Past Medical History:   Diagnosis Date    Anemia     Anticoagulant long-term use     Breast cancer     History of chemotherapy     Hypertension     Port-A-Cath in place     Thrombus     around port       Past Surgical History:   Procedure Laterality Date    HYSTERECTOMY      INSERTION OF VENOUS ACCESS PORT Left 10/30/2019    Procedure: INSERTION, VENOUS ACCESS PORT;  Surgeon: Kraig Kahn Jr., MD;  Location: [facility] OR;  Service: General;  Laterality: Left;  Left internal jugular Port-A-Cath Placement    MODIFIED RADICAL MASTECTOMY W/ AXILLARY LYMPH NODE DISSECTION Right 4/3/2020    Procedure: MASTECTOMY, MODIFIED RADICAL;  Surgeon: Mireille Raya MD;  Location: [facility];  Service: General;  Laterality: Right;    WRIST FRACTURE SURGERY Bilateral        Review of patient's allergies indicates:  No Known Allergies    Current Facility-Administered Medications on File Prior to Encounter   Medication    [COMPLETED] diazePAM injection 2.5 mg    [COMPLETED] diphenhydrAMINE injection 12.5 mg    [COMPLETED] iohexoL (OMNIPAQUE 350) injection 75 mL    [COMPLETED] lidocaine (PF) 40 mg/mL (4 %) injection 2 mL    [COMPLETED] metoclopramide HCl injection 10 mg    [COMPLETED] morphine injection 4  mg    [COMPLETED] sodium chloride 0.9% bolus 500 mL     Current Outpatient Medications on File Prior to Encounter   Medication Sig    apixaban (ELIQUIS) 5 mg Tab Take 10 mg twice a day for 1 week followed by 5 mg twice a day.    aspirin 81 MG Chew Take 1 tablet (81 mg total) by mouth once daily.    carvedilol (COREG) 12.5 MG tablet Take 1 tablet (12.5 mg total) by mouth 2 (two) times daily.    cyproheptadine (PERIACTIN) 4 mg tablet Take 1 tablet (4 mg total) by mouth 2 (two) times daily as needed (for appetite).    gabapentin (NEURONTIN) 300 MG capsule Take 1 capsule (300 mg total) by mouth 3 (three) times daily.    HYDROcodone-acetaminophen (NORCO) 5-325 mg per tablet Take 1 tablet by mouth every 6 (six) hours as needed for Pain.    ibuprofen (ADVIL,MOTRIN) 800 MG tablet Take 800 mg by mouth every 8 (eight) hours as needed.    lisinopril (PRINIVIL,ZESTRIL) 20 MG tablet Take 1 tablet (20 mg total) by mouth once daily.    megestrol (MEGACE) 400 mg/10 mL (40 mg/mL) Susp Take 10 mLs (400 mg total) by mouth once daily.    ondansetron (ZOFRAN) 8 MG tablet Take on every 12 hours x 3 days post chemotherapy, and then one every 12 hours as needed for nausea.    oxyCODONE (ROXICODONE) 5 MG immediate release tablet Take 1 every 6 hour as needed for pain     Family History     Problem Relation (Age of Onset)    Breast cancer Maternal Grandmother    Ovarian cancer Mother        Tobacco Use    Smoking status: Current Every Day Smoker     Packs/day: 0.50     Years: 46.00     Pack years: 23.00    Smokeless tobacco: Current User    Tobacco comment: smoking few cig per day now, smoking cessation packet  given & discussed   Substance and Sexual Activity    Alcohol use: No    Drug use: No    Sexual activity: Not on file     Review of Systems   Unable to perform ROS: Other (patient refusing)     Objective:     Vital Signs (Most Recent):  Temp: 98.3 °F (36.8 °C) (07/19/20 1942)  Pulse: 63 (07/19/20 1942)  Resp: 20  (07/19/20 1942)  BP: (!) 193/97 (07/19/20 1942)  SpO2: 100 % (07/19/20 1942) Vital Signs (24h Range):  Temp:  [98.3 °F (36.8 °C)-98.5 °F (36.9 °C)] 98.3 °F (36.8 °C)  Pulse:  [63-69] 63  Resp:  [16-20] 20  SpO2:  [100 %] 100 %  BP: (162-226)/() 193/97        There is no height or weight on file to calculate BMI.    Physical Exam  Constitutional:       Appearance: She is well-developed.   HENT:      Head: Normocephalic.   Eyes:      General:         Right eye: No discharge.         Left eye: No discharge.      Conjunctiva/sclera: Conjunctivae normal.   Neck:      Musculoskeletal: Normal range of motion and neck supple.   Cardiovascular:      Rate and Rhythm: Normal rate and regular rhythm.      Pulses:           Radial pulses are 2+ on the right side and 2+ on the left side.      Heart sounds: Normal heart sounds.   Pulmonary:      Effort: Pulmonary effort is normal. No respiratory distress.      Breath sounds: Examination of the right-lower field reveals decreased breath sounds. Examination of the left-lower field reveals decreased breath sounds. Decreased breath sounds present.   Abdominal:      General: Bowel sounds are increased. There is no distension.      Palpations: Abdomen is soft.      Tenderness: There is no abdominal tenderness.   Musculoskeletal: Normal range of motion.   Skin:     General: Skin is warm and dry.   Neurological:      Mental Status: She is lethargic.      GCS: GCS eye subscore is 2. GCS motor subscore is 5.   Psychiatric:         Attention and Perception: She is inattentive.         Mood and Affect: Affect is flat.         Speech: She is noncommunicative.         Behavior: Behavior is withdrawn.             Significant Labs:   CBC:   Recent Labs   Lab 07/19/20  1246   WBC 5.67   HGB 12.2   HCT 38.6        CMP:   Recent Labs   Lab 07/19/20  1246      K 3.8      CO2 24   *   BUN 16   CREATININE 0.93   CALCIUM 10.2   PROT 8.4   ALBUMIN 4.7   BILITOT 0.5    ALKPHOS 88   AST 29   ALT 17   ANIONGAP 11   EGFRNONAA >60.0       Significant Imaging: I have reviewed all pertinent imaging results/findings within the past 24 hours.    Assessment/Plan:     * Partial small bowel obstruction  Reported SBO on CT    NGT  Consult General Surgery  IVF  NPO  Morphine for pain      Essential hypertension  Hypertensive currently    Hydralazine PRN for SBP > 170      Stage 3 chronic kidney disease  Creatinine .93, at baseline    CMP daily, monitor for acute decompensation        VTE Risk Mitigation (From admission, onward)         Ordered     IP VTE HIGH RISK PATIENT  Once      07/19/20 1950     Place sequential compression device  Until discontinued      07/19/20 1950     Reason for No Pharmacological VTE Prophylaxis  Once     Question:  Reasons:  Answer:  Already adequately anticoagulated on oral Anticoagulants    07/19/20 1950                   Zen Wolff NP  Department of Hospital Medicine   [facility]

## 2020-07-20 NOTE — SUBJECTIVE & OBJECTIVE
Interval History: Pt agitated overnight. She won't open eyes during my evaluation and will only shake head yes/no to questions. Denies abdominal pain, N/V. + flatus but no BM.    Review of Systems   Constitutional: Negative for chills and fever.   Respiratory: Negative for shortness of breath.    Cardiovascular: Negative for leg swelling.   Gastrointestinal: Negative for abdominal pain, nausea and vomiting.     Objective:     Vital Signs (Most Recent):  Temp: 99.4 °F (37.4 °C) (07/20/20 0710)  Pulse: 98 (07/20/20 1000)  Resp: 18 (07/20/20 0710)  BP: 108/67 (07/20/20 0853)  SpO2: 98 % (07/20/20 0710) Vital Signs (24h Range):  Temp:  [97.6 °F (36.4 °C)-99.4 °F (37.4 °C)] 99.4 °F (37.4 °C)  Pulse:  [60-98] 98  Resp:  [16-20] 18  SpO2:  [98 %-100 %] 98 %  BP: (108-226)/() 108/67     Weight: 48.5 kg (106 lb 14.8 oz)  Body mass index is 17.26 kg/m².    Intake/Output Summary (Last 24 hours) at 7/20/2020 1156  Last data filed at 7/20/2020 0534  Gross per 24 hour   Intake 0 ml   Output 1200 ml   Net -1200 ml      Physical Exam  Vitals signs and nursing note reviewed.   Constitutional:       General: She is not in acute distress.     Appearance: Normal appearance. She is well-developed.   Cardiovascular:      Rate and Rhythm: Normal rate and regular rhythm.      Heart sounds: Normal heart sounds.   Pulmonary:      Effort: Pulmonary effort is normal.      Breath sounds: Normal breath sounds.   Abdominal:      General: Bowel sounds are normal.      Palpations: Abdomen is soft.      Tenderness: There is no abdominal tenderness.   Musculoskeletal: Normal range of motion.      Right lower leg: No edema.      Left lower leg: No edema.   Skin:     General: Skin is warm and dry.   Neurological:      Comments: Drowsy, awakens to voice but won't answer questions except with nodding.         Significant Labs:   CBC:   Recent Labs   Lab 07/19/20  1246 07/20/20  0417   WBC 5.67 5.60   HGB 12.2 12.2   HCT 38.6 38.5    249      CMP:   Recent Labs   Lab 07/19/20  1246 07/20/20  0417    138   K 3.8 3.4*    104   CO2 24 20*   * 91   BUN 16 9   CREATININE 0.93 0.9   CALCIUM 10.2 9.8   PROT 8.4 7.7   ALBUMIN 4.7 3.6   BILITOT 0.5 0.4   ALKPHOS 88 81   AST 29 33   ALT 17 32   ANIONGAP 11 14   EGFRNONAA >60.0 >60     All pertinent labs within the past 24 hours have been reviewed.    Significant Imaging: I have reviewed and interpreted all pertinent imaging results/findings within the past 24 hours.

## 2020-07-20 NOTE — ASSESSMENT & PLAN NOTE
Uncontrolled  Resume coreg 12.5 mg BID and lisinopril 20 mg qd with hold parameters  Hydralazine PRN for SBP > 170

## 2020-07-20 NOTE — ASSESSMENT & PLAN NOTE
S/p modified radical mastectomy April 2020  Currently undergoing radiation and chemotherapy will follow

## 2020-07-20 NOTE — NURSING
B/P 209/113, Zen Wolff NP notified, new orders written for PRN hydralazine and cardiac monitoring by NP.

## 2020-07-21 ENCOUNTER — DOCUMENTATION ONLY (OUTPATIENT)
Dept: HEMATOLOGY/ONCOLOGY | Facility: CLINIC | Age: 65
End: 2020-07-21

## 2020-07-21 VITALS
OXYGEN SATURATION: 96 % | SYSTOLIC BLOOD PRESSURE: 136 MMHG | DIASTOLIC BLOOD PRESSURE: 63 MMHG | HEIGHT: 66 IN | HEART RATE: 59 BPM | BODY MASS INDEX: 17.19 KG/M2 | TEMPERATURE: 99 F | WEIGHT: 106.94 LBS | RESPIRATION RATE: 14 BRPM

## 2020-07-21 LAB
ALBUMIN SERPL BCP-MCNC: 2.8 G/DL (ref 3.5–5.2)
ANION GAP SERPL CALC-SCNC: 8 MMOL/L (ref 8–16)
BUN SERPL-MCNC: 10 MG/DL (ref 8–23)
CALCIUM SERPL-MCNC: 9 MG/DL (ref 8.7–10.5)
CHLORIDE SERPL-SCNC: 108 MMOL/L (ref 95–110)
CO2 SERPL-SCNC: 23 MMOL/L (ref 23–29)
CREAT SERPL-MCNC: 1 MG/DL (ref 0.5–1.4)
EST. GFR  (AFRICAN AMERICAN): >60 ML/MIN/1.73 M^2
EST. GFR  (NON AFRICAN AMERICAN): 60 ML/MIN/1.73 M^2
GLUCOSE SERPL-MCNC: 95 MG/DL (ref 70–110)
MAGNESIUM SERPL-MCNC: 1.8 MG/DL (ref 1.6–2.6)
PHOSPHATE SERPL-MCNC: 3 MG/DL (ref 2.7–4.5)
POTASSIUM SERPL-SCNC: 3.9 MMOL/L (ref 3.5–5.1)
SODIUM SERPL-SCNC: 139 MMOL/L (ref 136–145)

## 2020-07-21 PROCEDURE — 80069 RENAL FUNCTION PANEL: CPT

## 2020-07-21 PROCEDURE — 36415 COLL VENOUS BLD VENIPUNCTURE: CPT

## 2020-07-21 PROCEDURE — 25000003 PHARM REV CODE 250: Performed by: INTERNAL MEDICINE

## 2020-07-21 PROCEDURE — 99239 HOSP IP/OBS DSCHRG MGMT >30: CPT | Mod: ,,, | Performed by: INTERNAL MEDICINE

## 2020-07-21 PROCEDURE — 83735 ASSAY OF MAGNESIUM: CPT

## 2020-07-21 PROCEDURE — 99239 PR HOSPITAL DISCHARGE DAY,>30 MIN: ICD-10-PCS | Mod: ,,, | Performed by: INTERNAL MEDICINE

## 2020-07-21 RX ORDER — CARVEDILOL 12.5 MG/1
12.5 TABLET ORAL 2 TIMES DAILY
Qty: 60 TABLET | Refills: 0 | Status: SHIPPED | OUTPATIENT
Start: 2020-07-21 | End: 2022-11-28 | Stop reason: SDUPTHER

## 2020-07-21 RX ORDER — LISINOPRIL 20 MG/1
20 TABLET ORAL DAILY
Qty: 30 TABLET | Refills: 0 | Status: SHIPPED | OUTPATIENT
Start: 2020-07-21 | End: 2020-09-14

## 2020-07-21 RX ORDER — LISINOPRIL 20 MG/1
20 TABLET ORAL DAILY
Qty: 30 TABLET | Refills: 0 | Status: SHIPPED | OUTPATIENT
Start: 2020-07-21 | End: 2020-07-21

## 2020-07-21 RX ORDER — ONDANSETRON 4 MG/1
4 TABLET, ORALLY DISINTEGRATING ORAL EVERY 8 HOURS PRN
Qty: 30 TABLET | Refills: 0 | Status: SHIPPED | OUTPATIENT
Start: 2020-07-21 | End: 2022-01-18

## 2020-07-21 RX ORDER — CARVEDILOL 12.5 MG/1
12.5 TABLET ORAL 2 TIMES DAILY
Qty: 60 TABLET | Refills: 0 | Status: SHIPPED | OUTPATIENT
Start: 2020-07-21 | End: 2020-07-21

## 2020-07-21 RX ADMIN — ASPIRIN 81 MG 81 MG: 81 TABLET ORAL at 09:07

## 2020-07-21 RX ADMIN — LISINOPRIL 20 MG: 20 TABLET ORAL at 09:07

## 2020-07-21 RX ADMIN — CARVEDILOL 12.5 MG: 12.5 TABLET, FILM COATED ORAL at 09:07

## 2020-07-21 NOTE — PROGRESS NOTES
PROMISE called to check in with pt and she said she was on her way home from hospital.  She said she has a radiation appt tomorrow at main campus and that [employer] had already called her to say they would pick her up.  PROMISE called [employer] (MsKenyon Steven).  She said someone from [facility] called them to arrange a ride for pt tomorrow.  Promise called radiation (Estee) who confirmed that pt has a 145 appt.  PROMISE will attempt to meet pt face to face tomorrow while she is on campus for her appt.

## 2020-07-21 NOTE — PROGRESS NOTES
Hungry, wants to eat.  Tolerated liqs.  T 99.4  Abd soft  Reg diet.  OK to DC later if reg diet tolerated.

## 2020-07-21 NOTE — PLAN OF CARE
No significant events overnight. Remains free from fall, injury, skin breakdown. VSS on RA throughout the night. Positions self-independently. Pt denies pain. All alarms active and auduble.  Plan of care reviewed w/ pt and all questions answered. Bed locked and in lowest position. Call light w/I reach. No needs at this time. Purposeful hourly rounding. Will continue to monitor.

## 2020-07-21 NOTE — PLAN OF CARE
Patient is discharged home with self care.     SERINA Enciso contacted [facility] for mediation. SW explained MD orders medication if patient can not afford medication CM department with assist. LMSW informed RN that radiation appointment must be rescheduled.    LMSW contacted [facility] [phone]. SW resume patients radiation tomorrow 7- at 1:45pm.     LMSW contacted [facility] [phone] and spoke to [place]. LMSW restarted patients transportation.     SW contacted the patients  Torin and informed him of the patient radiation appointment. Torin is asking about the patients blood pressure medication he states he has picked up the patient from [facility] and she did not have it with her. SW contacted MD for prescription to be sent to community pharmacy. MD ordered. SW contacted the patients  and informed him of .    CM needs addressed for discharge.          07/21/20 1358   Final Note   Assessment Type Final Discharge Note   Anticipated Discharge Disposition Home   What phone number can be called within the next 1-3 days to see how you are doing after discharge? [phone]   Hospital Follow Up  Appt(s) scheduled? Yes   Discharge plans and expectations educations in teach back method with documentation complete? Yes

## 2020-07-22 ENCOUNTER — PATIENT OUTREACH (OUTPATIENT)
Dept: ADMINISTRATIVE | Facility: CLINIC | Age: 65
End: 2020-07-22

## 2020-07-22 ENCOUNTER — DOCUMENTATION ONLY (OUTPATIENT)
Dept: RADIATION ONCOLOGY | Facility: CLINIC | Age: 65
End: 2020-07-22

## 2020-07-22 ENCOUNTER — DOCUMENTATION ONLY (OUTPATIENT)
Dept: HEMATOLOGY/ONCOLOGY | Facility: CLINIC | Age: 65
End: 2020-07-22

## 2020-07-22 PROCEDURE — 77412 RADIATION TX DELIVERY LVL 3: CPT | Performed by: RADIOLOGY

## 2020-07-22 NOTE — PROGRESS NOTES
Sw attempted to meet with pt face to face today, however she had already left campus by the time SW called radiation. Sw will attempt to meet pt face to face at next opportunity.

## 2020-07-22 NOTE — PATIENT INSTRUCTIONS
Small Bowel Obstruction     Small bowel obstruction can lead to tissue damage and even tissue death.   A small bowel obstruction occurs when part or all of the small intestine (bowel) is blocked. As a result, digestive contents cant move through the bowel properly and out of the body. Treatment is needed right away to remove the blockage. This can ease painful symptoms. It can also prevent serious problems, such as tissue death or bursting (rupture) of the small bowel. Without treatment, a small bowel obstruction can be fatal.  Causes of small bowel obstruction  A small bowel obstruction can be caused by:  · Scar tissue (adhesions). These may form after belly (abdominal) surgery or an infection.  · Hernia. A hernia is when an organ pushes through a weak spot or tear in the abdomen wall. Part of the small bowel can push out and be seen as a bulge under the belly. Hernias can also occur internally.  · Certain health problems. These include when part of the bowel slides inside another part (intussusception). Other causes include irritable bowel disease such as Crohns disease, and inflammation and sores in the intestine (ulcerative colitis).  · Abnormal tissue growths (tumors). These can form on the inside or outside of the small bowel. They are usually due to cancer.  Symptoms of small bowel obstruction  Common symptoms include:  · Belly cramping and pain  · Belly swelling and bloating  · Upset stomach (nausea) and vomiting  · Can't  pass gas  · Can't pass stool (constipation)  · Diarrhea  Diagnosing small bowel obstruction  Your provider will ask about your symptoms and health history. Youll also have a physical exam. Tests may also be done to confirm the problem. These can include:  · Imaging tests. These provide pictures of the small bowel. Common tests include X-rays and a CT scan.  · Blood tests. These check for infection and other problems, such as excess fluid loss (dehydration).  · Upper GI  (gastrointestinal) series with a small bowel follow-through. This test takes X-rays of the upper digestive tract from the mouth through the small bowel. An X-ray dye (contrast fluid) is used. The dye coats the inside of your upper digestive tract so it will show up clearly on X-rays.  Treating small bowel obstruction  Treatment takes place in a hospital. As part of your care, the following may be done:  · No food or drink is given by mouth. This allows your bowels to rest.  · An IV (intravenous) line is placed in a vein in your arm or hand. The IV line is used to give fluids. It may also be used to give medicines. These may be needed to ease pain, nausea, and other symptoms. They may also be needed to treat or prevent infections.  · A soft, thin, flexible tube (nasogastric tube) is inserted through your nose and into your stomach. The tube is used to remove extra gas and fluid in your stomach and bowels. This helps to ease symptoms such as pain and swelling.  · In severe cases, surgery is done. This may be needed if the small bowel is almost or totally blocked, or there is a hole in the bowel (bowel perforation). During surgery, the blockage is removed. Parts of the bowel may also be removed if there is tissue death. Other repair may be done as well, depending on what caused the blockage. Your healthcare provider will give you more information about surgery, if needed.  · Youll be watched closely in the hospital until your symptoms improve. Your provider will tell you when you can go home.  Long-term concerns   After treatment, most people recover with no lasting effects. If a long part of the bowel is removed, there is a greater chance for lifelong digestive problems. Bowel movements may become irregular. Work with your provider to learn the best ways to manage any symptoms you may have, and to protect your health.  When to call your healthcare provider  Call your provider right away if you have any of the  following:  · Severe pain (Call 911)  · Belly swelling or cramping that wont go away  · Cant pass stool or gas  · Nausea or vomiting (especially if the vomit looks or smells like stool)   Date Last Reviewed: 7/1/2016  © 8843-1096 [employer]. [address] [place] [address], [place] [address]. All rights reserved. This information is not intended as a substitute for professional medical care. Always follow your healthcare professional's instructions.

## 2020-07-22 NOTE — PLAN OF CARE
Pt. On day 27 of outpt. Xrt to chest wall.  Dry desquamation.  Pt. Denies pain.  Inpt. For SBO yesterday.

## 2020-07-23 PROCEDURE — 77412 RADIATION TX DELIVERY LVL 3: CPT | Performed by: RADIOLOGY

## 2020-07-24 PROCEDURE — 77412 RADIATION TX DELIVERY LVL 3: CPT | Performed by: RADIOLOGY

## 2020-07-25 NOTE — DISCHARGE SUMMARY
[facility]  Hospital Medicine  Discharge Summary      Patient Name: Briseyda Rojas  MRN: [id number]  Admission Date: 7/19/2020  Hospital Length of Stay: 2 days  Discharge Date and Time: 7/21/2020  1:48 PM  Attending Physician: Essence att. providers found   Discharging Provider: DYLON Aguilar MD  Primary Care Provider: Merle Cunningham NP (Inactive)      HPI:   The patient is a 63 yo female who presented to [facility] with 1 week of abdominal pain and emesis.  Upon diagnostic workup, the patient was found to have partial small bowel obstruction.  She was transferred to [facility] [place], operated by [facility] for General Surgery consult.    * No surgery found *      Hospital Course:   Admitted and general surgery consulted. Pain, nausea controlled with medications. Symptoms improved and diet advanced, and she was able to tolerate regular diet. With resolution of presenting symptoms and clinical stability, she was prepared for discharge home.     Consults:   Consults (From admission, onward)        Status Ordering Provider     Inpatient consult to General Surgery  Once     Provider:  Yaya Lopez Jr., MD    Completed RYLAN ALEJANDRO new Assessment & Plan notes have been filed under this hospital service since the last note was generated.  Service: Hospital Medicine    Final Active Diagnoses:    Diagnosis Date Noted POA    PRINCIPAL PROBLEM:  Partial small bowel obstruction [K56.600] 07/19/2020 Yes    Breast cancer, right breast [C50.911] 10/30/2019 Yes    Essential hypertension [I10] 10/29/2018 Yes    Stage 3 chronic kidney disease [N18.3] 10/27/2018 Yes      Problems Resolved During this Admission:       Discharged Condition: good    Disposition: Home or Self Care    Follow Up:  Follow-up Information     Merle Cunningham NP In 2 weeks.    Specialty: Family Medicine  Why: post-hospital follow-up  Contact information:  [address] [facility] [address] [phone]                 Patient  Instructions:      Diet Adult Regular     Order Specific Question Answer Comments   Additional restrictions: Low Fiber      Notify your health care provider if you experience any of the following:  persistent nausea and vomiting or diarrhea     Notify your health care provider if you experience any of the following:  severe uncontrolled pain     Notify your health care provider if you experience any of the following:  increased confusion or weakness     Notify your health care provider if you experience any of the following:  persistent dizziness, light-headedness, or visual disturbances     Activity as tolerated       Significant Diagnostic Studies:   CBC:  Recent Labs   Lab 07/19/20  1246 07/20/20  0417   WBC 5.67 5.60   HGB 12.2 12.2   HCT 38.6 38.5    249   GRAN 70.5  4.0 74.2*  4.2   LYMPH 16.8*  1.0 11.4*  0.6*   MONO 9.2  0.5 9.1  0.5   EOS 0.2 0.3   BASO 0.03 0.03     CMP:  Recent Labs   Lab 07/19/20  1246 07/20/20  0417 07/21/20  0358    138 139   K 3.8 3.4* 3.9    104 108   CO2 24 20* 23   BUN 16 9 10   CREATININE 0.93 0.9 1.0   * 91 95   CALCIUM 10.2 9.8 9.0   MG  --  1.8 1.8   PHOS  --  3.3 3.0   ALKPHOS 88 81  --    AST 29 33  --    ALT 17 32  --    BILITOT 0.5 0.4  --    PROT 8.4 7.7  --    ALBUMIN 4.7 3.6 2.8*   ANIONGAP 11 14 8       Pending Diagnostic Studies:     None         Medications:  Reconciled Home Medications:      Medication List      START taking these medications    carvediloL 12.5 MG tablet  Commonly known as: COREG  Take 1 tablet (12.5 mg total) by mouth 2 (two) times daily.     lisinopriL 20 MG tablet  Commonly known as: PRINIVIL,ZESTRIL  Take 1 tablet (20 mg total) by mouth once daily.     ondansetron 4 MG Tbdl  Commonly known as: ZOFRAN-ODT  Dissolve 1 tablet (4 mg total) by mouth every 8 (eight) hours as needed (nausea / vomiting).        CONTINUE taking these medications    apixaban 5 mg Tab  Commonly known as: ELIQUIS  Take 10 mg twice a day for 1  week followed by 5 mg twice a day.     aspirin 81 MG Chew  Take 1 tablet (81 mg total) by mouth once daily.     cyproheptadine 4 mg tablet  Commonly known as: PERIACTIN  Take 1 tablet (4 mg total) by mouth 2 (two) times daily as needed (for appetite).     gabapentin 300 MG capsule  Commonly known as: NEURONTIN  Take 1 capsule (300 mg total) by mouth 3 (three) times daily.     ibuprofen 800 MG tablet  Commonly known as: ADVIL,MOTRIN  Take 800 mg by mouth every 8 (eight) hours as needed.     megestroL 400 mg/10 mL (40 mg/mL) Susp  Commonly known as: MEGACE  Take 10 mLs (400 mg total) by mouth once daily.     ondansetron 8 MG tablet  Commonly known as: ZOFRAN  Take on every 12 hours x 3 days post chemotherapy, and then one every 12 hours as needed for nausea.        STOP taking these medications    HYDROcodone-acetaminophen 5-325 mg per tablet  Commonly known as: NORCO     oxyCODONE 5 MG immediate release tablet  Commonly known as: ROXICODONE            Indwelling Lines/Drains at time of discharge:   Lines/Drains/Airways     Central Venous Catheter Line            Port A Cath Single Lumen left atrial;left subclavian -- days         PowerPort A Cath Single Lumen 10/30/19 1251 left internal jugular 269 days          Drain                 Closed/Suction Drain 04/03/20 0859 Right Breast Bulb 19 Fr. 113 days                Time spent on the discharge of patient: 35 minutes  Patient was seen and examined on the date of discharge and determined to be suitable for discharge.         DYLON Aguilar MD  Department of Hospital Medicine  [facility]

## 2020-07-25 NOTE — HOSPITAL COURSE
Admitted and general surgery consulted. Pain, nausea controlled with medications. Symptoms improved and diet advanced, and she was able to tolerate regular diet. With resolution of presenting symptoms and clinical stability, she was prepared for discharge home.

## 2020-07-27 ENCOUNTER — LAB VISIT (OUTPATIENT)
Dept: LAB | Facility: HOSPITAL | Age: 65
End: 2020-07-27
Attending: INTERNAL MEDICINE
Payer: MEDICAID

## 2020-07-27 ENCOUNTER — TELEPHONE (OUTPATIENT)
Dept: PHARMACY | Facility: CLINIC | Age: 65
End: 2020-07-27

## 2020-07-27 ENCOUNTER — OFFICE VISIT (OUTPATIENT)
Dept: HEMATOLOGY/ONCOLOGY | Facility: CLINIC | Age: 65
End: 2020-07-27
Payer: MEDICAID

## 2020-07-27 VITALS
WEIGHT: 102.5 LBS | OXYGEN SATURATION: 100 % | HEART RATE: 65 BPM | DIASTOLIC BLOOD PRESSURE: 77 MMHG | HEIGHT: 66 IN | BODY MASS INDEX: 16.47 KG/M2 | RESPIRATION RATE: 16 BRPM | SYSTOLIC BLOOD PRESSURE: 157 MMHG | TEMPERATURE: 99 F

## 2020-07-27 DIAGNOSIS — T45.1X5A ANEMIA ASSOCIATED WITH CHEMOTHERAPY: ICD-10-CM

## 2020-07-27 DIAGNOSIS — Z17.1 CARCINOMA OF AXILLARY TAIL OF RIGHT BREAST IN FEMALE, ESTROGEN RECEPTOR NEGATIVE: ICD-10-CM

## 2020-07-27 DIAGNOSIS — D64.81 ANEMIA ASSOCIATED WITH CHEMOTHERAPY: ICD-10-CM

## 2020-07-27 DIAGNOSIS — C50.611 CARCINOMA OF AXILLARY TAIL OF RIGHT BREAST IN FEMALE, ESTROGEN RECEPTOR NEGATIVE: Primary | ICD-10-CM

## 2020-07-27 DIAGNOSIS — C50.611 CARCINOMA OF AXILLARY TAIL OF RIGHT BREAST IN FEMALE, ESTROGEN RECEPTOR NEGATIVE: ICD-10-CM

## 2020-07-27 DIAGNOSIS — Z17.1 CARCINOMA OF AXILLARY TAIL OF RIGHT BREAST IN FEMALE, ESTROGEN RECEPTOR NEGATIVE: Primary | ICD-10-CM

## 2020-07-27 LAB
ALBUMIN SERPL BCP-MCNC: 3.4 G/DL (ref 3.5–5.2)
ALP SERPL-CCNC: 90 U/L (ref 55–135)
ALT SERPL W/O P-5'-P-CCNC: 24 U/L (ref 10–44)
ANION GAP SERPL CALC-SCNC: 5 MMOL/L (ref 8–16)
AST SERPL-CCNC: 20 U/L (ref 10–40)
BILIRUB SERPL-MCNC: 0.2 MG/DL (ref 0.1–1)
BUN SERPL-MCNC: 17 MG/DL (ref 8–23)
CALCIUM SERPL-MCNC: 9 MG/DL (ref 8.7–10.5)
CHLORIDE SERPL-SCNC: 106 MMOL/L (ref 95–110)
CO2 SERPL-SCNC: 30 MMOL/L (ref 23–29)
CREAT SERPL-MCNC: 1.2 MG/DL (ref 0.5–1.4)
ERYTHROCYTE [DISTWIDTH] IN BLOOD BY AUTOMATED COUNT: 14.8 % (ref 11.5–14.5)
EST. GFR  (AFRICAN AMERICAN): 55.2 ML/MIN/1.73 M^2
EST. GFR  (NON AFRICAN AMERICAN): 47.9 ML/MIN/1.73 M^2
GLUCOSE SERPL-MCNC: 64 MG/DL (ref 70–110)
HCT VFR BLD AUTO: 36.8 % (ref 37–48.5)
HGB BLD-MCNC: 11.7 G/DL (ref 12–16)
IMM GRANULOCYTES # BLD AUTO: 0.01 K/UL (ref 0–0.04)
MCH RBC QN AUTO: 29.8 PG (ref 27–31)
MCHC RBC AUTO-ENTMCNC: 31.8 G/DL (ref 32–36)
MCV RBC AUTO: 94 FL (ref 82–98)
NEUTROPHILS # BLD AUTO: 2.6 K/UL (ref 1.8–7.7)
PLATELET # BLD AUTO: 256 K/UL (ref 150–350)
PMV BLD AUTO: 9.4 FL (ref 9.2–12.9)
POTASSIUM SERPL-SCNC: 4.2 MMOL/L (ref 3.5–5.1)
PROT SERPL-MCNC: 7 G/DL (ref 6–8.4)
RBC # BLD AUTO: 3.93 M/UL (ref 4–5.4)
SODIUM SERPL-SCNC: 141 MMOL/L (ref 136–145)
WBC # BLD AUTO: 4.75 K/UL (ref 3.9–12.7)

## 2020-07-27 PROCEDURE — 85027 COMPLETE CBC AUTOMATED: CPT

## 2020-07-27 PROCEDURE — 36415 COLL VENOUS BLD VENIPUNCTURE: CPT

## 2020-07-27 PROCEDURE — 99215 OFFICE O/P EST HI 40 MIN: CPT | Mod: S$PBB,,, | Performed by: INTERNAL MEDICINE

## 2020-07-27 PROCEDURE — 77412 RADIATION TX DELIVERY LVL 3: CPT | Performed by: RADIOLOGY

## 2020-07-27 PROCEDURE — 80053 COMPREHEN METABOLIC PANEL: CPT

## 2020-07-27 PROCEDURE — 99214 OFFICE O/P EST MOD 30 MIN: CPT | Mod: PBBFAC,25 | Performed by: INTERNAL MEDICINE

## 2020-07-27 PROCEDURE — 99999 PR PBB SHADOW E&M-EST. PATIENT-LVL IV: CPT | Mod: PBBFAC,,, | Performed by: INTERNAL MEDICINE

## 2020-07-27 PROCEDURE — 99999 PR PBB SHADOW E&M-EST. PATIENT-LVL IV: ICD-10-PCS | Mod: PBBFAC,,, | Performed by: INTERNAL MEDICINE

## 2020-07-27 PROCEDURE — 99215 PR OFFICE/OUTPT VISIT, EST, LEVL V, 40-54 MIN: ICD-10-PCS | Mod: S$PBB,,, | Performed by: INTERNAL MEDICINE

## 2020-07-27 RX ORDER — CAPECITABINE 500 MG/1
TABLET, FILM COATED ORAL
Qty: 98 TABLET | Refills: 5 | Status: SHIPPED | OUTPATIENT
Start: 2020-07-27 | End: 2020-09-25 | Stop reason: DRUGHIGH

## 2020-07-27 NOTE — PROGRESS NOTES
Subjective:       Patient ID: Briseyda Rojas is a 64 y.o. female.    Chief Complaint: No chief complaint on file.        Briseyda Rojas presents today for follow-up.  She has now completed her radiation therapy.  Of note, on April 3, 2020 she underwent a right mastectomy and AND.  She had DCIS and two residual foci of invasive carcinoma measuring 3 mm and 5 mm respectively.  Three of 6 lymph nodes were positive.  She started radiation therapy in late May, and she completed on July 3rd.  She is here to discuss further adjuvant chemotherapy options.    Briefly, she is a 64 y.o. female whom I saw initially on October 22, 2019 for a recently diagnosed triple negative right breast cancer.  She underwent mammography and ultrasound in early September 2019 which showed architectural distortion in the right upper lateral breast and pleomorphic calcifications in the right upper mid breast.  Of note, this was her first mammogram ever.  Ultrasound of the axilla was also performed which showed a prominent lymph node.  She underwent biopsy of all 3 lesions. The right upper lateral lesion returned as DCIS with comedo necrosis.  The right upper mid breast came back as normal.  The right axillary lymph node returned as infiltrating ductal carcinoma, ER negative, IN negative, HER2 equivocal but negative by FISH.  She was been seen by Dr. Raya and she was referred for consideration of neoadjuvant chemotherapy.  She completed 4 cycles of neoadjuvant Adriamycin and cyclophosphamide and 4 cycles of taxol.  She subsequently underwent a right mastectomy with results as above.  As mentioned above, she has now completed her radiation therapy.  Her CBC today shows a walk count of 4,700 per cubic mm, hemoglobin 11.7 grams/deciliter, hematocrit 36.8% and platelets 256,000 per cubic mm.    Review of Systems    Overall she feels OK and she has no complaints.  Her ECOG PS is 1.  She denies any anxiety, depression, easy bruising, chills, night  sweats,   fever, constipation, diplopia,  blurred vision, headache, chest pain, palpitations, shortness of breath, left breast pain, abdominal pain, extremity pain, or difficulty ambulating.  The remainder of the ten-point ROS, including general, skin, lymph, H/N, cardiorespiratory, GI, , Neuro, Endocrine, and psychiatric is negative.     Objective:      Physical Exam      She is alert, oriented to time, place, person, appears anxious but she is in no acute distress.                              VITAL SIGNS:  Reviewed                                      HEENT:  Normal.  There are no nasal, oral, lip, gingival, auricular, or lid lesions.  She does have pterygia on both eyes.  Mucosae are moist and pink, and there is no        thrush.  Pupils are equal, reactive to light and accommodation.              Extraocular muscle movements are intact.  Dentition is poor with several decayed teeth.  There is no frontal or maxillary tenderness.                                     NECK:  Supple without JVD, adenopathy, or thyromegaly.                       LUNGS:  Clear to auscultation without wheezing, rales, or rhonchi.           CARDIOVASCULAR:  Reveals an S1, S2, no murmurs, no rubs, no gallops.         ABDOMEN:  Soft, nontender, without organomegaly.  Bowel sounds are    present.                                                                     EXTREMITIES:  No cyanosis, clubbing, or edema.                               BREASTS:   she is now status post right mastectomy.  Her incision has healed nicely and there is no evidence for chest wall recurrence.       There are no masses in the left breast.                             LYMPHATIC:  There is no cervical, left axillary, or supraclavicular adenopathy.   SKIN:  Warm and moist, without petechiae, rashes, induration, or ecchymoses.  There is hyperpigmentation within the radiation therapy on the right anterior chest wall with a small area of hyperpigmentation on her back.             NEUROLOGIC:  DTRs are 0-1+ bilaterally, symmetrical, motor function is 5/5,  and cranial nerves are  within normal limits.    Assessment:       1. 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  Patient is on apixaban.         Plan:       I had a long discussion with her.  The pros and cons of adjuvant chemotherapy with a Xeloda were explained to her.  A prescription was sent to the [facility].  I told her to begin her treatment on August 3rd and I will see her on August 24th for follow-up with repeat labs.  Her multiple questions were answered to her satisfaction.

## 2020-07-27 NOTE — Clinical Note
Please obtain authorization for the Xeloda.  I wont her to start it on August 3rd.  I will see her on August 24th with a CBC and a CMP

## 2020-07-27 NOTE — TELEPHONE ENCOUNTER
Informed Spouse Torin  that [facility] received prescription for Capecitabine and benefits investigation is required.  [facility] will be back in touch once insurance determination is received.

## 2020-07-28 PROCEDURE — 77336 RADIATION PHYSICS CONSULT: CPT | Performed by: RADIOLOGY

## 2020-07-28 PROCEDURE — 77412 RADIATION TX DELIVERY LVL 3: CPT | Performed by: RADIOLOGY

## 2020-07-29 ENCOUNTER — DOCUMENTATION ONLY (OUTPATIENT)
Dept: RADIATION ONCOLOGY | Facility: CLINIC | Age: 65
End: 2020-07-29

## 2020-07-29 PROCEDURE — 77412 RADIATION TX DELIVERY LVL 3: CPT | Performed by: RADIOLOGY

## 2020-07-29 NOTE — TELEPHONE ENCOUNTER
DOCUMENTATION ONLY:  Capecitabine 500 mg Tablet #98/21 does not require a prior authorization through the patient's insurance.    Co-pay: $0.00    Patient Assistance IS NOT required. Sending to the clinical pharmacist for  and shipment. Everardo GREENE

## 2020-07-29 NOTE — TELEPHONE ENCOUNTER
Initial capecitabine consult for treatment of breast cancer completed on  . Capecitabine will be shipped on  to arrive at patient's home on  via [employer]. $ 0.00 copay. Patient plans to start capecitabine on 8/3. Address confirmed, CC on file. Confirmed 2 patient identifiers - name and . Therapy Appropriate. Spoke to patient's , Torin.  was unfamiliar with the medication as he was able to go her appointment due to COVID-19 restrictions. Explained that the medication is used to treat her breast cancer as therapy following her radiation therapy. The medication was discussed in detail as seen below.    Provided information about the pharmacy (normal business hours, on-call pharmacist for medication related questions or concerns, periodic follow up calls, refill process) and shipment ([employer], packaging).    Instructed to store medication in at room temperature in a cool, dry area away from any pets and children. Avoid storing in the bathroom as the moisture from showers make cause the pills to stick together and affect the stability. Also, avoid storing aware near direct heat (stove, fireplace, windowsill).     Educated on proper handling precautions (avoid touching the medication directly - use disposable gloves or the cap of bottle/vial or dosing cup). If contact is made, patient should wash hands immediately and thoroughly to ensure no transfer to others not intended for. Informed  that no one else should ever handle the medication directly.     Counseled to take Xeloda 500 mg - 4 tablets (2000mg) in the AM and 3 tablets (1500 mg) in the PM within 30 min of a meal. Take for 14 days on and 7 days off. Do not crush, chew, or break tablets.     Labs reviewed from 2020. CBC - decreased, but stable (will monitor). CMP - stable except creatinine increased. Renal function is decreased with current CrCl of 34.8 mg/dL based on creatinine of 1.2 mg/dL. Renal dosage adjustment is  recommended for CrCl 30 to 50 mL/minute: Initial: Reduce dose to 75% of usual dose. MD was informed and provided recommendation to reduce dose (Xeloda 3-500 mg along with 1-150 mg in the AM and 2-500 mg along with 1-150 mg in the PM for 2 weeks on and 1 week off along), but would like to stick with dose as prescribed for now. Hepatic function is stable. Therapy appropriate for breast cancer, adjuvant therapy (off label; in HER2-negative patients with residual disease after neoadjuvant therapy and surgery).     Counseled on common SEs: swelling (elevate legs above hips for 1 hr/day); fatigue/weakness/dizziness (monitor for changes); HF syndrome (eucerin provided for dry/peeling skin; if blisters and cracks, notify MD due to risk for infection); rash (hydrocortisone provided - do not apply to open wounds/cuts); diarrhea (stay well hydrated; OTC Imodium - use as directed; if still >4 loose stools, notify MD due to risk for dehydration); constipation (stay well hydrated; OTC Colace or Miralax); N/V (Rx Zofran PRN); loss of appetite (monitor for changes); mouth sores (home remedy: 1 cup water + 1/2 tsp salt + 1/2 tsp baking soda - swish and spit); hair loss/thinning (6%); insomnia; changes in taste; indigestion (OTC Pepcid or TUMS); increased risk for infection (monitor for fever >100.5, achy chills, wet persistent cough) and bleeding (monitor for abnormal bruising/bleeding - blood in urine/stools/nose bleeds); SOB.    Reviewed underlying comorbid conditions/risk factors: CVD (seek medical attention if she experiences CP, neck pain, shoulder pain, fast or irregular HR, SOB, dizziness, sweating, lightheadedness, swelling, etc.); kidney disease (monitor for signs of potential worsening kidney function as medication may need dose adjustments: inability to drink/keep liquids down; pass only small amounts of urine, dark urine, loss of appetite, swelling, increased BP, N/V, itchy/dry/pale skin, etc.).    DDIs:  Medication list  "reviewed in EPIC. Patient only takes lisinopril, carvedilol, and PRN ondansetron. Patient will start Xeloda therapy on Monday, 8/3.  -Ondansetron: Xeloda may enhance the QTc-prolonging effect of ondansetron (Cat B). No action needed.     confirmed patient has NKDA and that there are no changes to health conditions listed in EPIC.    Appetite: Patient "definitely [has] an appetite" - eats "everything and anything". Eats 6-7 times/day.  Energy: Patient has "plenty [of] energy" - "don't know how to sit down".  Pain: 0/10 - denies pain.     inquired what the patient could take for weight gain - informed him it appears the patient has a prescription for Megace at local pharmacy to help with appetite and weight gain.  like refill and have patient start taking it.      appeared to have great understanding of everything discussed. All questions were answered to his satisfaction, and he is aware to call [facility] if he or the patient have any further questions.    "

## 2020-07-29 NOTE — PLAN OF CARE
Pt. On day 31 of outpt xrt to chest wall.  Pt. With brisk erythema and dry desquamation.  Pt. On boost. Skin intact. Rtc 6 weeks.

## 2020-07-30 PROCEDURE — 77412 RADIATION TX DELIVERY LVL 3: CPT | Performed by: RADIOLOGY

## 2020-07-31 PROCEDURE — 77412 RADIATION TX DELIVERY LVL 3: CPT | Performed by: RADIOLOGY

## 2020-08-03 ENCOUNTER — HOSPITAL ENCOUNTER (OUTPATIENT)
Dept: RADIATION THERAPY | Facility: HOSPITAL | Age: 65
Discharge: HOME | End: 2020-08-03
Attending: RADIOLOGY
Payer: MEDICAID

## 2020-08-04 ENCOUNTER — TELEPHONE (OUTPATIENT)
Dept: RADIATION ONCOLOGY | Facility: CLINIC | Age: 65
End: 2020-08-04

## 2020-08-04 NOTE — TELEPHONE ENCOUNTER
LM for pt. To return call.  Pt. Did not show up for radiation.  Requested return call to reschedule radiation appts.

## 2020-08-10 NOTE — TELEPHONE ENCOUNTER
Start date for Xeloda: 8/5  Dosing, how taking:  states that his wife is taking it as prescribed. Discussed again her directions: the Take 4 tablets by mouth in the morning and 3 tablets in the evening daily for 2 weeks followed by 1 week break. Discussed that she should take within about 30 min of a meal.    Storage: room temperature  Handling: discussed handling precautions again  Side effects: notes no side effects at this time and aware to contact [facility] for any questions or concerns.

## 2020-08-13 ENCOUNTER — TELEPHONE (OUTPATIENT)
Dept: RADIATION ONCOLOGY | Facility: CLINIC | Age: 65
End: 2020-08-13

## 2020-08-13 ENCOUNTER — DOCUMENTATION ONLY (OUTPATIENT)
Dept: HEMATOLOGY/ONCOLOGY | Facility: CLINIC | Age: 65
End: 2020-08-13

## 2020-08-13 NOTE — TELEPHONE ENCOUNTER
Spoke to pt. Concerning missed radiation appts.  Pt. Stated that she will return on Monday 8/17 and Tuesday 8/18 to complete last 2 radiation treatments She asked to have the  call her to discuss financial assistance.  Sent note to KIMBERLEE re:same.

## 2020-08-13 NOTE — PROGRESS NOTES
In response to in basket message from SERINA Duffy, KIMBERLEE called pt at both of the numbers listed in her chart ([phone] and [phone]).  No one answered either number.  SW left detailed VM on the first number and there was no option to leave a VM on the second number. SW remains available.

## 2020-08-17 ENCOUNTER — TELEPHONE (OUTPATIENT)
Dept: PHARMACY | Facility: CLINIC | Age: 65
End: 2020-08-17

## 2020-08-18 ENCOUNTER — TELEPHONE (OUTPATIENT)
Dept: RADIATION ONCOLOGY | Facility: CLINIC | Age: 65
End: 2020-08-18

## 2020-08-18 NOTE — TELEPHONE ENCOUNTER
Spoke with Pt.'s .  Informed him that Dr. Hernandez did not feel that the last treatment would be necessary due to the lapse of  time missed.  She would be glad to answer any questions or concerns.  Pt.'s  stated that they will return for follow up appt. In 6 weeks.

## 2020-08-19 PROCEDURE — 77336 RADIATION PHYSICS CONSULT: CPT | Performed by: RADIOLOGY

## 2020-08-24 ENCOUNTER — OFFICE VISIT (OUTPATIENT)
Dept: HEMATOLOGY/ONCOLOGY | Facility: CLINIC | Age: 65
End: 2020-08-24
Payer: MEDICAID

## 2020-08-24 ENCOUNTER — LAB VISIT (OUTPATIENT)
Dept: LAB | Facility: HOSPITAL | Age: 65
End: 2020-08-24
Attending: INTERNAL MEDICINE
Payer: MEDICAID

## 2020-08-24 VITALS
TEMPERATURE: 98 F | HEIGHT: 66 IN | HEART RATE: 65 BPM | BODY MASS INDEX: 16.08 KG/M2 | RESPIRATION RATE: 16 BRPM | SYSTOLIC BLOOD PRESSURE: 166 MMHG | WEIGHT: 100.06 LBS | DIASTOLIC BLOOD PRESSURE: 77 MMHG | OXYGEN SATURATION: 95 %

## 2020-08-24 DIAGNOSIS — C50.611 CARCINOMA OF AXILLARY TAIL OF RIGHT BREAST IN FEMALE, ESTROGEN RECEPTOR NEGATIVE: ICD-10-CM

## 2020-08-24 DIAGNOSIS — Z17.1 CARCINOMA OF AXILLARY TAIL OF RIGHT BREAST IN FEMALE, ESTROGEN RECEPTOR NEGATIVE: Primary | ICD-10-CM

## 2020-08-24 DIAGNOSIS — Z17.1 CARCINOMA OF AXILLARY TAIL OF RIGHT BREAST IN FEMALE, ESTROGEN RECEPTOR NEGATIVE: ICD-10-CM

## 2020-08-24 DIAGNOSIS — D64.81 ANEMIA ASSOCIATED WITH CHEMOTHERAPY: ICD-10-CM

## 2020-08-24 DIAGNOSIS — T45.1X5A ANEMIA ASSOCIATED WITH CHEMOTHERAPY: ICD-10-CM

## 2020-08-24 DIAGNOSIS — C50.611 CARCINOMA OF AXILLARY TAIL OF RIGHT BREAST IN FEMALE, ESTROGEN RECEPTOR NEGATIVE: Primary | ICD-10-CM

## 2020-08-24 LAB
ALBUMIN SERPL BCP-MCNC: 3.4 G/DL (ref 3.5–5.2)
ALP SERPL-CCNC: 85 U/L (ref 55–135)
ALT SERPL W/O P-5'-P-CCNC: 9 U/L (ref 10–44)
ANION GAP SERPL CALC-SCNC: 9 MMOL/L (ref 8–16)
AST SERPL-CCNC: 16 U/L (ref 10–40)
BILIRUB SERPL-MCNC: 0.2 MG/DL (ref 0.1–1)
BUN SERPL-MCNC: 22 MG/DL (ref 8–23)
CALCIUM SERPL-MCNC: 9 MG/DL (ref 8.7–10.5)
CHLORIDE SERPL-SCNC: 108 MMOL/L (ref 95–110)
CO2 SERPL-SCNC: 23 MMOL/L (ref 23–29)
CREAT SERPL-MCNC: 1 MG/DL (ref 0.5–1.4)
ERYTHROCYTE [DISTWIDTH] IN BLOOD BY AUTOMATED COUNT: 13.7 % (ref 11.5–14.5)
EST. GFR  (AFRICAN AMERICAN): >60 ML/MIN/1.73 M^2
EST. GFR  (NON AFRICAN AMERICAN): 59.7 ML/MIN/1.73 M^2
GLUCOSE SERPL-MCNC: 74 MG/DL (ref 70–110)
HCT VFR BLD AUTO: 37.5 % (ref 37–48.5)
HGB BLD-MCNC: 11.6 G/DL (ref 12–16)
IMM GRANULOCYTES # BLD AUTO: 0.01 K/UL (ref 0–0.04)
MCH RBC QN AUTO: 30 PG (ref 27–31)
MCHC RBC AUTO-ENTMCNC: 30.9 G/DL (ref 32–36)
MCV RBC AUTO: 97 FL (ref 82–98)
NEUTROPHILS # BLD AUTO: 2.5 K/UL (ref 1.8–7.7)
PLATELET # BLD AUTO: 168 K/UL (ref 150–350)
PMV BLD AUTO: 9.9 FL (ref 9.2–12.9)
POTASSIUM SERPL-SCNC: 4.1 MMOL/L (ref 3.5–5.1)
PROT SERPL-MCNC: 6.9 G/DL (ref 6–8.4)
RBC # BLD AUTO: 3.87 M/UL (ref 4–5.4)
SODIUM SERPL-SCNC: 140 MMOL/L (ref 136–145)
WBC # BLD AUTO: 4.41 K/UL (ref 3.9–12.7)

## 2020-08-24 PROCEDURE — 99999 PR PBB SHADOW E&M-EST. PATIENT-LVL IV: CPT | Mod: PBBFAC,,, | Performed by: INTERNAL MEDICINE

## 2020-08-24 PROCEDURE — 99999 PR PBB SHADOW E&M-EST. PATIENT-LVL IV: ICD-10-PCS | Mod: PBBFAC,,, | Performed by: INTERNAL MEDICINE

## 2020-08-24 PROCEDURE — 80053 COMPREHEN METABOLIC PANEL: CPT

## 2020-08-24 PROCEDURE — 85027 COMPLETE CBC AUTOMATED: CPT

## 2020-08-24 PROCEDURE — 99214 OFFICE O/P EST MOD 30 MIN: CPT | Mod: S$PBB,,, | Performed by: INTERNAL MEDICINE

## 2020-08-24 PROCEDURE — 99214 PR OFFICE/OUTPT VISIT, EST, LEVL IV, 30-39 MIN: ICD-10-PCS | Mod: S$PBB,,, | Performed by: INTERNAL MEDICINE

## 2020-08-24 PROCEDURE — 99214 OFFICE O/P EST MOD 30 MIN: CPT | Mod: PBBFAC | Performed by: INTERNAL MEDICINE

## 2020-08-24 PROCEDURE — 36415 COLL VENOUS BLD VENIPUNCTURE: CPT

## 2020-08-24 NOTE — PROGRESS NOTES
Subjective:       Patient ID: Briseyda Rojas is a 64 y.o. female.    Chief Complaint: No chief complaint on file.        Briseyda Rojas presents today for follow-up.  She has now completed her radiation therapy.  Of note, on April 3, 2020 she underwent a right mastectomy and AND.  She had DCIS and two residual foci of invasive carcinoma measuring 3 mm and 5 mm respectively.  Three of 6 lymph nodes were positive.  She started radiation therapy in late May, and she completed on July 3rd.  She is here to discuss further adjuvant chemotherapy options.    Briefly, she is a 64 y.o. female whom I saw initially on October 22, 2019 for a recently diagnosed triple negative right breast cancer.  She underwent mammography and ultrasound in early September 2019 which showed architectural distortion in the right upper lateral breast and pleomorphic calcifications in the right upper mid breast.  Of note, this was her first mammogram ever.  Ultrasound of the axilla was also performed which showed a prominent lymph node.  She underwent biopsy of all 3 lesions. The right upper lateral lesion returned as DCIS with comedo necrosis.  The right upper mid breast came back as normal.  The right axillary lymph node returned as infiltrating ductal carcinoma, ER negative, IN negative, HER2 equivocal but negative by FISH.  She was been seen by Dr. Raya and she was referred for consideration of neoadjuvant chemotherapy.  She completed 4 cycles of neoadjuvant Adriamycin and cyclophosphamide and 4 cycles of taxol.  She subsequently underwent a right mastectomy with results as above.  As mentioned above, she has now completed her radiation therapy.  Her CBC today shows a walk count of 4,400 per cubic mm, hemoglobin 11.6 grams/deciliter, hematocrit 37.6% and platelets 168,000 per cubic mm.    Review of Systems    Overall she feels OK and she has no complaints.  Her ECOG PS is 1.  She denies any anxiety, depression, easy bruising, chills, night  sweats,   fever, constipation, diplopia,  blurred vision, headache, chest pain, palpitations, shortness of breath, left breast pain, abdominal pain, extremity pain, or difficulty ambulating.  The remainder of the ten-point ROS, including general, skin, lymph, H/N, cardiorespiratory, GI, , Neuro, Endocrine, and psychiatric is negative.     Objective:      Physical Exam      She is alert, oriented to time, place, person, appears anxious but she is in no acute distress.                              VITAL SIGNS:  Reviewed                                      HEENT:  Normal.  There are no nasal, oral, lip, gingival, auricular, or lid lesions.  She does have pterygia on both eyes.  Mucosae are moist and pink, and there is no        thrush.  Pupils are equal, reactive to light and accommodation.              Extraocular muscle movements are intact.  Dentition is poor with several decayed teeth.  There is no frontal or maxillary tenderness.                                     NECK:  Supple without JVD, adenopathy, or thyromegaly.                       LUNGS:  Clear to auscultation without wheezing, rales, or rhonchi.           CARDIOVASCULAR:  Reveals an S1, S2, no murmurs, no rubs, no gallops.         ABDOMEN:  Soft, nontender, without organomegaly.  Bowel sounds are    present.                                                                     EXTREMITIES:  No cyanosis, clubbing, or edema.                               BREASTS:   she is now status post right mastectomy.  Her incision has healed nicely and there is no evidence for chest wall recurrence.       There are no masses in the left breast.                             LYMPHATIC:  There is no cervical, left axillary, or supraclavicular adenopathy.   SKIN:  Warm and moist, without petechiae, rashes, induration, or ecchymoses.  There is hyperpigmentation within the radiation therapy on the right anterior chest wall with a small area of hyperpigmentation on her back.             NEUROLOGIC:  DTRs are 0-1+ bilaterally, symmetrical, motor function is 5/5,  and cranial nerves are  within normal limits.    Assessment:       1. 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  Patient is on apixaban.         Plan:       I had a long discussion with her.  She will restart Xeloda today and I will see her with repeat labs in 3 weeks for cycle 3.  We will administer 6 cycles.  Her multiple questions were answered to her satisfaction.

## 2020-08-27 NOTE — TELEPHONE ENCOUNTER
Call attempt #4. Called [phone] and [phone] number. Left a voice message for refill for capecitabine and to coordinate shipment. Will send postcard, physician pool staff message, and will follow up.

## 2020-09-03 ENCOUNTER — HOSPITAL ENCOUNTER (EMERGENCY)
Facility: HOSPITAL | Age: 65
Discharge: HOME | End: 2020-09-03
Attending: EMERGENCY MEDICINE
Payer: MEDICAID

## 2020-09-03 VITALS
DIASTOLIC BLOOD PRESSURE: 79 MMHG | HEART RATE: 66 BPM | BODY MASS INDEX: 16.14 KG/M2 | TEMPERATURE: 99 F | SYSTOLIC BLOOD PRESSURE: 144 MMHG | RESPIRATION RATE: 16 BRPM | WEIGHT: 100 LBS | OXYGEN SATURATION: 98 %

## 2020-09-03 DIAGNOSIS — S90.32XA CONTUSION OF LEFT FOOT, INITIAL ENCOUNTER: Primary | ICD-10-CM

## 2020-09-03 DIAGNOSIS — Z01.818 PRE-OP EVALUATION: ICD-10-CM

## 2020-09-03 DIAGNOSIS — S90.812A: ICD-10-CM

## 2020-09-03 DIAGNOSIS — S97.82XA CRUSHING INJURY OF LEFT FOOT, INITIAL ENCOUNTER: ICD-10-CM

## 2020-09-03 LAB
ALBUMIN SERPL BCP-MCNC: 2.9 G/DL (ref 3.5–5.2)
ALP SERPL-CCNC: 87 U/L (ref 55–135)
ALT SERPL W/O P-5'-P-CCNC: 9 U/L (ref 10–44)
ANION GAP SERPL CALC-SCNC: 8 MMOL/L (ref 8–16)
AST SERPL-CCNC: 14 U/L (ref 10–40)
BASOPHILS # BLD AUTO: 0.02 K/UL (ref 0–0.2)
BASOPHILS NFR BLD: 0.5 % (ref 0–1.9)
BILIRUB SERPL-MCNC: 0.2 MG/DL (ref 0.1–1)
BUN SERPL-MCNC: 19 MG/DL (ref 8–23)
CALCIUM SERPL-MCNC: 9.1 MG/DL (ref 8.7–10.5)
CHLORIDE SERPL-SCNC: 110 MMOL/L (ref 95–110)
CK SERPL-CCNC: 93 U/L (ref 20–180)
CO2 SERPL-SCNC: 25 MMOL/L (ref 23–29)
CREAT SERPL-MCNC: 1.1 MG/DL (ref 0.5–1.4)
DIFFERENTIAL METHOD: ABNORMAL
EOSINOPHIL # BLD AUTO: 0.3 K/UL (ref 0–0.5)
EOSINOPHIL NFR BLD: 7.6 % (ref 0–8)
ERYTHROCYTE [DISTWIDTH] IN BLOOD BY AUTOMATED COUNT: 13.2 % (ref 11.5–14.5)
EST. GFR  (AFRICAN AMERICAN): >60 ML/MIN/1.73 M^2
EST. GFR  (NON AFRICAN AMERICAN): 53.2 ML/MIN/1.73 M^2
GLUCOSE SERPL-MCNC: 118 MG/DL (ref 70–110)
HCT VFR BLD AUTO: 34.3 % (ref 37–48.5)
HGB BLD-MCNC: 10.6 G/DL (ref 12–16)
IMM GRANULOCYTES # BLD AUTO: 0 K/UL (ref 0–0.04)
IMM GRANULOCYTES NFR BLD AUTO: 0 % (ref 0–0.5)
INR PPP: 0.9 (ref 0.8–1.2)
LYMPHOCYTES # BLD AUTO: 0.9 K/UL (ref 1–4.8)
LYMPHOCYTES NFR BLD: 21.2 % (ref 18–48)
MCH RBC QN AUTO: 29.4 PG (ref 27–31)
MCHC RBC AUTO-ENTMCNC: 30.9 G/DL (ref 32–36)
MCV RBC AUTO: 95 FL (ref 82–98)
MONOCYTES # BLD AUTO: 0.6 K/UL (ref 0.3–1)
MONOCYTES NFR BLD: 13.6 % (ref 4–15)
NEUTROPHILS # BLD AUTO: 2.4 K/UL (ref 1.8–7.7)
NEUTROPHILS NFR BLD: 57.1 % (ref 38–73)
NRBC BLD-RTO: 0 /100 WBC
PLATELET # BLD AUTO: 248 K/UL (ref 150–350)
PMV BLD AUTO: 9.7 FL (ref 9.2–12.9)
POTASSIUM SERPL-SCNC: 3.7 MMOL/L (ref 3.5–5.1)
PROT SERPL-MCNC: 6.9 G/DL (ref 6–8.4)
PROTHROMBIN TIME: 10.3 SEC (ref 9–12.5)
RBC # BLD AUTO: 3.61 M/UL (ref 4–5.4)
SARS-COV-2 RDRP RESP QL NAA+PROBE: NEGATIVE
SODIUM SERPL-SCNC: 143 MMOL/L (ref 136–145)
WBC # BLD AUTO: 4.2 K/UL (ref 3.9–12.7)

## 2020-09-03 PROCEDURE — 93010 EKG 12-LEAD: ICD-10-PCS | Mod: ,,, | Performed by: INTERNAL MEDICINE

## 2020-09-03 PROCEDURE — 99285 EMERGENCY DEPT VISIT HI MDM: CPT | Mod: 25

## 2020-09-03 PROCEDURE — 93005 ELECTROCARDIOGRAM TRACING: CPT

## 2020-09-03 PROCEDURE — U0002 COVID-19 LAB TEST NON-CDC: HCPCS

## 2020-09-03 PROCEDURE — 80053 COMPREHEN METABOLIC PANEL: CPT

## 2020-09-03 PROCEDURE — 99285 EMERGENCY DEPT VISIT HI MDM: CPT | Mod: ,,, | Performed by: EMERGENCY MEDICINE

## 2020-09-03 PROCEDURE — 93010 ELECTROCARDIOGRAM REPORT: CPT | Mod: ,,, | Performed by: INTERNAL MEDICINE

## 2020-09-03 PROCEDURE — 25000003 PHARM REV CODE 250: Performed by: EMERGENCY MEDICINE

## 2020-09-03 PROCEDURE — 96374 THER/PROPH/DIAG INJ IV PUSH: CPT

## 2020-09-03 PROCEDURE — 85025 COMPLETE CBC W/AUTO DIFF WBC: CPT

## 2020-09-03 PROCEDURE — 63600175 PHARM REV CODE 636 W HCPCS: Performed by: EMERGENCY MEDICINE

## 2020-09-03 PROCEDURE — 96361 HYDRATE IV INFUSION ADD-ON: CPT

## 2020-09-03 PROCEDURE — 82550 ASSAY OF CK (CPK): CPT

## 2020-09-03 PROCEDURE — 85610 PROTHROMBIN TIME: CPT

## 2020-09-03 PROCEDURE — 25500020 PHARM REV CODE 255: Performed by: EMERGENCY MEDICINE

## 2020-09-03 PROCEDURE — 99285 PR EMERGENCY DEPT VISIT,LEVEL V: ICD-10-PCS | Mod: ,,, | Performed by: EMERGENCY MEDICINE

## 2020-09-03 RX ORDER — LIDOCAINE HYDROCHLORIDE 10 MG/ML
10 INJECTION, SOLUTION EPIDURAL; INFILTRATION; INTRACAUDAL; PERINEURAL
Status: DISCONTINUED | OUTPATIENT
Start: 2020-09-03 | End: 2020-09-03

## 2020-09-03 RX ORDER — ONDANSETRON HYDROCHLORIDE 4 MG/5ML
4 SOLUTION ORAL ONCE
Status: COMPLETED | OUTPATIENT
Start: 2020-09-03 | End: 2020-09-03

## 2020-09-03 RX ORDER — NAPROXEN 500 MG/1
500 TABLET ORAL 2 TIMES DAILY WITH MEALS
Qty: 30 TABLET | Refills: 0 | Status: SHIPPED | OUTPATIENT
Start: 2020-09-03 | End: 2022-01-18

## 2020-09-03 RX ORDER — MORPHINE SULFATE 4 MG/ML
4 INJECTION, SOLUTION INTRAMUSCULAR; INTRAVENOUS
Status: COMPLETED | OUTPATIENT
Start: 2020-09-03 | End: 2020-09-03

## 2020-09-03 RX ORDER — HYDROCODONE BITARTRATE AND ACETAMINOPHEN 5; 325 MG/1; MG/1
1 TABLET ORAL EVERY 4 HOURS PRN
Qty: 18 TABLET | Refills: 0 | Status: SHIPPED | OUTPATIENT
Start: 2020-09-03 | End: 2020-10-20

## 2020-09-03 RX ADMIN — ONDANSETRON 4 MG: 4 SOLUTION ORAL at 12:09

## 2020-09-03 RX ADMIN — SODIUM CHLORIDE 500 ML: 0.9 INJECTION, SOLUTION INTRAVENOUS at 12:09

## 2020-09-03 RX ADMIN — IOHEXOL 75 ML: 350 INJECTION, SOLUTION INTRAVENOUS at 01:09

## 2020-09-03 RX ADMIN — MORPHINE SULFATE 4 MG: 4 INJECTION INTRAVENOUS at 12:09
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
## 2020-09-03 NOTE — DISCHARGE INSTRUCTIONS
Do not drink or drive on this medication.  This medication puts you at risk for a fall.  Please use a cane or walker as needed.     Do not take motrin/ibuprofen/advil.    Apply bacitracin to wound twice a day until healed.    Our goal in the emergency department is to always give you outstanding care and exceptional service. You may receive a survey by mail or e-mail in the next week regarding your experience in our ED. We would greatly appreciate your completing and returning the survey. Your feedback provides us with a way to recognize our staff who give very good care and it helps us learn how to improve when your experience was below our aspiration of excellence.
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
## 2020-09-03 NOTE — ED NOTES
Pt left with family. Pt awake, alert and oriented x 3. Respirations are spontaneous with normal rate and effort.

## 2020-09-09 ENCOUNTER — OFFICE VISIT (OUTPATIENT)
Dept: PODIATRY | Facility: CLINIC | Age: 65
End: 2020-09-09
Payer: MEDICAID

## 2020-09-09 VITALS — BODY MASS INDEX: 16.07 KG/M2 | WEIGHT: 100 LBS | HEIGHT: 66 IN

## 2020-09-09 DIAGNOSIS — S90.32XA CONTUSION OF LEFT FOOT, INITIAL ENCOUNTER: ICD-10-CM

## 2020-09-09 DIAGNOSIS — M79.672 LEFT FOOT PAIN: Primary | ICD-10-CM

## 2020-09-09 DIAGNOSIS — S90.812A ABRASION, LEFT FOOT, INITIAL ENCOUNTER: ICD-10-CM

## 2020-09-09 DIAGNOSIS — S97.82XA CRUSHING INJURY OF LEFT FOOT, INITIAL ENCOUNTER: ICD-10-CM

## 2020-09-09 PROCEDURE — 99999 PR PBB SHADOW E&M-EST. PATIENT-LVL IV: ICD-10-PCS | Mod: PBBFAC,,, | Performed by: PODIATRIST

## 2020-09-09 PROCEDURE — 99999 PR PBB SHADOW E&M-EST. PATIENT-LVL IV: CPT | Mod: PBBFAC,,, | Performed by: PODIATRIST

## 2020-09-09 PROCEDURE — 99214 OFFICE O/P EST MOD 30 MIN: CPT | Mod: PBBFAC,PN | Performed by: PODIATRIST

## 2020-09-09 PROCEDURE — 99203 OFFICE O/P NEW LOW 30 MIN: CPT | Mod: S$PBB,,, | Performed by: PODIATRIST

## 2020-09-09 PROCEDURE — 99203 PR OFFICE/OUTPT VISIT, NEW, LEVL III, 30-44 MIN: ICD-10-PCS | Mod: S$PBB,,, | Performed by: PODIATRIST

## 2020-09-09 RX ORDER — TRAMADOL HYDROCHLORIDE 50 MG/1
50 TABLET ORAL EVERY 6 HOURS PRN
Qty: 20 TABLET | Refills: 0 | Status: SHIPPED | OUTPATIENT
Start: 2020-09-09 | End: 2022-01-18

## 2020-09-09 RX ORDER — TRAMADOL HYDROCHLORIDE 50 MG/1
50 TABLET ORAL EVERY 6 HOURS PRN
Qty: 20 TABLET | Refills: 0 | Status: SHIPPED | OUTPATIENT
Start: 2020-09-09 | End: 2020-09-09 | Stop reason: SDUPTHER
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
## 2020-09-14 ENCOUNTER — LAB VISIT (OUTPATIENT)
Dept: LAB | Facility: HOSPITAL | Age: 65
End: 2020-09-14
Attending: INTERNAL MEDICINE
Payer: MEDICAID

## 2020-09-14 ENCOUNTER — DOCUMENTATION ONLY (OUTPATIENT)
Dept: HEMATOLOGY/ONCOLOGY | Facility: CLINIC | Age: 65
End: 2020-09-14

## 2020-09-14 ENCOUNTER — OFFICE VISIT (OUTPATIENT)
Dept: HEMATOLOGY/ONCOLOGY | Facility: CLINIC | Age: 65
End: 2020-09-14
Payer: MEDICAID

## 2020-09-14 VITALS
TEMPERATURE: 98 F | OXYGEN SATURATION: 98 % | HEART RATE: 69 BPM | RESPIRATION RATE: 16 BRPM | SYSTOLIC BLOOD PRESSURE: 173 MMHG | DIASTOLIC BLOOD PRESSURE: 80 MMHG

## 2020-09-14 DIAGNOSIS — Z17.1 CARCINOMA OF AXILLARY TAIL OF RIGHT BREAST IN FEMALE, ESTROGEN RECEPTOR NEGATIVE: Primary | ICD-10-CM

## 2020-09-14 DIAGNOSIS — C50.611 CARCINOMA OF AXILLARY TAIL OF RIGHT BREAST IN FEMALE, ESTROGEN RECEPTOR NEGATIVE: ICD-10-CM

## 2020-09-14 DIAGNOSIS — D64.81 ANEMIA ASSOCIATED WITH CHEMOTHERAPY: ICD-10-CM

## 2020-09-14 DIAGNOSIS — Z17.1 CARCINOMA OF AXILLARY TAIL OF RIGHT BREAST IN FEMALE, ESTROGEN RECEPTOR NEGATIVE: ICD-10-CM

## 2020-09-14 DIAGNOSIS — C50.611 CARCINOMA OF AXILLARY TAIL OF RIGHT BREAST IN FEMALE, ESTROGEN RECEPTOR NEGATIVE: Primary | ICD-10-CM

## 2020-09-14 DIAGNOSIS — T45.1X5A ANEMIA ASSOCIATED WITH CHEMOTHERAPY: ICD-10-CM

## 2020-09-14 LAB
ALBUMIN SERPL BCP-MCNC: 3.1 G/DL (ref 3.5–5.2)
ALP SERPL-CCNC: 107 U/L (ref 55–135)
ALT SERPL W/O P-5'-P-CCNC: 21 U/L (ref 10–44)
ANION GAP SERPL CALC-SCNC: 6 MMOL/L (ref 8–16)
AST SERPL-CCNC: 21 U/L (ref 10–40)
BILIRUB SERPL-MCNC: 0.2 MG/DL (ref 0.1–1)
BUN SERPL-MCNC: 22 MG/DL (ref 8–23)
CALCIUM SERPL-MCNC: 8.8 MG/DL (ref 8.7–10.5)
CHLORIDE SERPL-SCNC: 110 MMOL/L (ref 95–110)
CO2 SERPL-SCNC: 24 MMOL/L (ref 23–29)
CREAT SERPL-MCNC: 1.2 MG/DL (ref 0.5–1.4)
ERYTHROCYTE [DISTWIDTH] IN BLOOD BY AUTOMATED COUNT: 13.8 % (ref 11.5–14.5)
EST. GFR  (AFRICAN AMERICAN): 55.2 ML/MIN/1.73 M^2
EST. GFR  (NON AFRICAN AMERICAN): 47.9 ML/MIN/1.73 M^2
GLUCOSE SERPL-MCNC: 70 MG/DL (ref 70–110)
HCT VFR BLD AUTO: 36 % (ref 37–48.5)
HGB BLD-MCNC: 11 G/DL (ref 12–16)
IMM GRANULOCYTES # BLD AUTO: 0.01 K/UL (ref 0–0.04)
MCH RBC QN AUTO: 29.2 PG (ref 27–31)
MCHC RBC AUTO-ENTMCNC: 30.6 G/DL (ref 32–36)
MCV RBC AUTO: 96 FL (ref 82–98)
NEUTROPHILS # BLD AUTO: 2.5 K/UL (ref 1.8–7.7)
PLATELET # BLD AUTO: 281 K/UL (ref 150–350)
PMV BLD AUTO: 9.5 FL (ref 9.2–12.9)
POTASSIUM SERPL-SCNC: 4.3 MMOL/L (ref 3.5–5.1)
PROT SERPL-MCNC: 7 G/DL (ref 6–8.4)
RBC # BLD AUTO: 3.77 M/UL (ref 4–5.4)
SODIUM SERPL-SCNC: 140 MMOL/L (ref 136–145)
WBC # BLD AUTO: 4.56 K/UL (ref 3.9–12.7)

## 2020-09-14 PROCEDURE — 99214 OFFICE O/P EST MOD 30 MIN: CPT | Mod: S$PBB,,, | Performed by: INTERNAL MEDICINE

## 2020-09-14 PROCEDURE — 99214 OFFICE O/P EST MOD 30 MIN: CPT | Mod: PBBFAC | Performed by: INTERNAL MEDICINE

## 2020-09-14 PROCEDURE — 99999 PR PBB SHADOW E&M-EST. PATIENT-LVL IV: CPT | Mod: PBBFAC,,, | Performed by: INTERNAL MEDICINE

## 2020-09-14 PROCEDURE — 36415 COLL VENOUS BLD VENIPUNCTURE: CPT

## 2020-09-14 PROCEDURE — 99214 PR OFFICE/OUTPT VISIT, EST, LEVL IV, 30-39 MIN: ICD-10-PCS | Mod: S$PBB,,, | Performed by: INTERNAL MEDICINE

## 2020-09-14 PROCEDURE — 85027 COMPLETE CBC AUTOMATED: CPT

## 2020-09-14 PROCEDURE — 80053 COMPREHEN METABOLIC PANEL: CPT

## 2020-09-14 PROCEDURE — 99999 PR PBB SHADOW E&M-EST. PATIENT-LVL IV: ICD-10-PCS | Mod: PBBFAC,,, | Performed by: INTERNAL MEDICINE

## 2020-09-14 RX ORDER — LISINOPRIL 20 MG/1
20 TABLET ORAL DAILY
Qty: 30 TABLET | Refills: 0 | Status: SHIPPED | OUTPATIENT
Start: 2020-09-14 | End: 2022-01-18

## 2020-09-14 NOTE — PROGRESS NOTES
Pt's  requested meeting with SW (pt had a clinic visit today).  Sw went down to clinic to meet, but was informed that pt and  left.  KIMBERLEE called pt.  Explained that SW had still not heard anything from Patience Friends.  KIMBERLEE mailed a [employer] application to pt via [employer].  Pt has no email.  Kimberlee enclosed a cover letter with name and phone number and encouraged pt to call with any questions.  KIMBERLEE remains available.
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
## 2020-09-25 ENCOUNTER — TELEPHONE (OUTPATIENT)
Dept: PHARMACY | Facility: CLINIC | Age: 65
End: 2020-09-25

## 2020-09-25 DIAGNOSIS — C50.611 CARCINOMA OF AXILLARY TAIL OF RIGHT BREAST IN FEMALE, ESTROGEN RECEPTOR NEGATIVE: ICD-10-CM

## 2020-09-25 DIAGNOSIS — Z17.1 CARCINOMA OF AXILLARY TAIL OF RIGHT BREAST IN FEMALE, ESTROGEN RECEPTOR NEGATIVE: ICD-10-CM

## 2020-09-25 RX ORDER — CAPECITABINE 500 MG/1
1000 TABLET, FILM COATED ORAL 2 TIMES DAILY
Qty: 84 TABLET | Refills: 3 | Status: SHIPPED | OUTPATIENT
Start: 2020-09-25 | End: 2020-11-10

## 2020-09-28 NOTE — TELEPHONE ENCOUNTER
Refill and dose change phone call for Xeloda. Spoke with patient's partner Torin who confirmed need of the refill. Will [employer] on  to arrive on  with patient consent. Copay $0 at 004. Address confirmed. Patient has unknown number of doses remaining - should be 0;  unsure. Next cycle start date: asap - last cycle started in beginning of August before patient lost to followup. Partner denies side effects.  No new medications/allergies/medical conditions. No ER/Urgent care visits in past month. Partner confirms that Dr. Armas reviewed cycle with MsKenyon Bob, including dose reduction to 3 ts po BID x 14 days with 7 days off. Denies any further questions. Confirmed 2 patient identifiers - Name and .

## 2020-10-15 ENCOUNTER — TELEPHONE (OUTPATIENT)
Dept: PHARMACY | Facility: CLINIC | Age: 65
End: 2020-10-15

## 2020-10-15 ENCOUNTER — OFFICE VISIT (OUTPATIENT)
Dept: SURGERY | Facility: CLINIC | Age: 65
End: 2020-10-15
Payer: MEDICAID

## 2020-10-15 VITALS
HEART RATE: 72 BPM | WEIGHT: 109.44 LBS | DIASTOLIC BLOOD PRESSURE: 83 MMHG | SYSTOLIC BLOOD PRESSURE: 157 MMHG | BODY MASS INDEX: 17.59 KG/M2 | HEIGHT: 66 IN

## 2020-10-15 DIAGNOSIS — Z12.31 SCREENING MAMMOGRAM, ENCOUNTER FOR: Primary | ICD-10-CM

## 2020-10-15 PROCEDURE — 99213 PR OFFICE/OUTPT VISIT, EST, LEVL III, 20-29 MIN: ICD-10-PCS | Mod: S$PBB,,, | Performed by: SURGERY

## 2020-10-15 PROCEDURE — 99213 OFFICE O/P EST LOW 20 MIN: CPT | Mod: S$PBB,,, | Performed by: SURGERY

## 2020-10-15 PROCEDURE — 99999 PR PBB SHADOW E&M-EST. PATIENT-LVL III: ICD-10-PCS | Mod: PBBFAC,,, | Performed by: SURGERY

## 2020-10-15 PROCEDURE — 99213 OFFICE O/P EST LOW 20 MIN: CPT | Mod: PBBFAC | Performed by: SURGERY

## 2020-10-15 PROCEDURE — 99999 PR PBB SHADOW E&M-EST. PATIENT-LVL III: CPT | Mod: PBBFAC,,, | Performed by: SURGERY

## 2020-10-15 NOTE — TELEPHONE ENCOUNTER
We reached out to Ms. Rojas and spoke to her  for her refill. He states that she still has 2 bottles in the refrigerator and has not been taking her medication. We were a bit confused and attempted to get more information. I explained that the Xeloda is a room temperature medication and should not be stored in the fridge. I thought we might be discussing different medications; however, he states that it's her chemo medication. He is not certain why she hasn't been taking the medication and would not go into detail about how she is doing or if she has been experiencing any significant side effects. She was also not able to speak with me on the phone today. It appears that the last few calls we have made have been to her  to set up refills. She missed her last appt scheduled on 10/9. Messaged Dr. De Leon's office to see if someone can reach out to patient to discuss and reschedule her appt.

## 2020-10-15 NOTE — PROGRESS NOTES
Pain and tenderness right chest wall.    rec vit e.    Due for MMG left breast 9/2020    F/u 6 months

## 2020-10-16 ENCOUNTER — TELEPHONE (OUTPATIENT)
Dept: HEMATOLOGY/ONCOLOGY | Facility: CLINIC | Age: 65
End: 2020-10-16

## 2020-10-16 NOTE — TELEPHONE ENCOUNTER
Message fwd to .       ----- Message from Varun Armas MD sent at 10/15/2020  4:10 PM CDT -----  Regarding: RE: Xeloda - patient not taking  Wow...  Lets bring her over ASAP please  ----- Message -----  From: Rachael Arechiga  Sent: 10/15/2020   3:20 PM CDT  To: Varun Armas MD  Subject: FW: Xeloda - patient not taking                  FYI- looks like she no showed on 10/5.   Want schedulers to get her in ASAP?  ----- Message -----  From: Chema GormanD  Sent: 10/15/2020   3:10 PM CDT  To: Kim Goodman, PharmD, Jero Gibbons, PharmD, #  Subject: Xeloda - patient not taking                      Good afternoon,    We reached out to Ms. Rojas and spoke to her  for her refill. He states that she still has 2 bottles in the refrigerator and has not been taking her medication. We were a bit confused and attempted to get more information. I explained that the Xeloda is a room temperature medication and should not be stored in the fridge. I thought we might be discussing different medications; however, he states that it's her chemo medication. He is not certain why she hasn't been taking the medication and would not go into detail about how she is doing or if she has been experiencing any significant side effects. She was also not able to speak with me on the phone today. It appears that the last few calls we have made have been to her  to set up refills. She missed her last appt scheduled on 10/9. Would someone be able to reach out to reschedule?     Thank you!    Sheila Barnes, PharmD  [facility]   [phone]

## 2020-10-16 NOTE — TELEPHONE ENCOUNTER
----- Message from Rachael Arechiga sent at 10/16/2020  9:32 AM CDT -----  Regarding: FW: Xeloda - patient not taking  Can we DB w/ Dr. De Leon sooner than 10/28?  ----- Message -----  From: Varun Armas MD  Sent: 10/16/2020   9:32 AM CDT  To: Rachael Arechiga  Subject: RE: Xeloda - patient not taking                  DB  ----- Message -----  From: Rachael Arechiga  Sent: 10/16/2020   9:16 AM CDT  To: Varun Armas MD  Subject: RE: Xeloda - patient not taking                  Nope! Can DB or have her see an KEANU earlier?  ----- Message -----  From: Varun Armas MD  Sent: 10/16/2020   9:15 AM CDT  To: Rachael Arechiga  Subject: RE: Xeloda - patient not taking                  Nothing available sooner???  ----- Message -----  From: Rachael Arechiga  Sent: 10/16/2020   7:17 AM CDT  To: Varun Armas MD  Subject: FW: Xeloda - patient not taking                  10/28 ok?  ----- Message -----  From: Rosa Maria Mcarthur  Sent: 10/16/2020   7:15 AM CDT  To: Rachael Arechiga  Subject: RE: Xeloda - patient not taking                  First available appointment is 10/28. Pls advise.  ----- Message -----  From: Rachael Arechiga  Sent: 10/16/2020   7:12 AM CDT  To: [facility]  Subject: FW: Xeloda - patient not taking                  Please schedule w/ Dr. De Leon asap- will need CBC/CMP prior  Thanks  ----- Message -----  From: Varun Armas MD  Sent: 10/15/2020   4:10 PM CDT  To: Rachael Arechiga  Subject: RE: Xeloda - patient not taking                  Wow...  Lets bring her over ASAP please  ----- Message -----  From: Rachael Arechiga  Sent: 10/15/2020   3:20 PM CDT  To: Varun Armas MD  Subject: FW: Xeloda - patient not taking                  FYI- looks like she no showed on 10/5.   Want schedulers to get her in ASAP?  ----- Message -----  From: Sheila Barnes, PharmD  Sent: 10/15/2020   3:10 PM CDT  To: Kim Goodman, PharmD, Jero Gibbons, PharmD, #  Subject: Xeloda - patient not  taking                      Good afternoon,    We reached out to Ms. Rojas and spoke to her  for her refill. He states that she still has 2 bottles in the refrigerator and has not been taking her medication. We were a bit confused and attempted to get more information. I explained that the Xeloda is a room temperature medication and should not be stored in the fridge. I thought we might be discussing different medications; however, he states that it's her chemo medication. He is not certain why she hasn't been taking the medication and would not go into detail about how she is doing or if she has been experiencing any significant side effects. She was also not able to speak with me on the phone today. It appears that the last few calls we have made have been to her  to set up refills. She missed her last appt scheduled on 10/9. Would someone be able to reach out to reschedule?     Thank you!    Sheila Barnes, PharmD  [facility]   [phone]

## 2020-10-19 ENCOUNTER — DOCUMENTATION ONLY (OUTPATIENT)
Dept: HEMATOLOGY/ONCOLOGY | Facility: CLINIC | Age: 65
End: 2020-10-19

## 2020-10-19 ENCOUNTER — SPECIALTY PHARMACY (OUTPATIENT)
Dept: PHARMACY | Facility: CLINIC | Age: 65
End: 2020-10-19

## 2020-10-20 ENCOUNTER — OFFICE VISIT (OUTPATIENT)
Dept: HEMATOLOGY/ONCOLOGY | Facility: CLINIC | Age: 65
End: 2020-10-20
Payer: MEDICAID

## 2020-10-20 ENCOUNTER — LAB VISIT (OUTPATIENT)
Dept: LAB | Facility: HOSPITAL | Age: 65
End: 2020-10-20
Attending: INTERNAL MEDICINE
Payer: MEDICAID

## 2020-10-20 ENCOUNTER — SPECIALTY PHARMACY (OUTPATIENT)
Dept: PHARMACY | Facility: CLINIC | Age: 65
End: 2020-10-20

## 2020-10-20 VITALS
SYSTOLIC BLOOD PRESSURE: 197 MMHG | HEIGHT: 66 IN | TEMPERATURE: 98 F | RESPIRATION RATE: 16 BRPM | DIASTOLIC BLOOD PRESSURE: 93 MMHG | WEIGHT: 111.56 LBS | HEART RATE: 64 BPM | OXYGEN SATURATION: 99 % | BODY MASS INDEX: 17.93 KG/M2

## 2020-10-20 DIAGNOSIS — C50.611 CARCINOMA OF AXILLARY TAIL OF RIGHT BREAST IN FEMALE, ESTROGEN RECEPTOR NEGATIVE: ICD-10-CM

## 2020-10-20 DIAGNOSIS — C50.611 CARCINOMA OF AXILLARY TAIL OF RIGHT BREAST IN FEMALE, ESTROGEN RECEPTOR NEGATIVE: Primary | ICD-10-CM

## 2020-10-20 DIAGNOSIS — T45.1X5A ANEMIA ASSOCIATED WITH CHEMOTHERAPY: ICD-10-CM

## 2020-10-20 DIAGNOSIS — D64.81 ANEMIA ASSOCIATED WITH CHEMOTHERAPY: ICD-10-CM

## 2020-10-20 DIAGNOSIS — Z17.1 CARCINOMA OF AXILLARY TAIL OF RIGHT BREAST IN FEMALE, ESTROGEN RECEPTOR NEGATIVE: ICD-10-CM

## 2020-10-20 DIAGNOSIS — Z17.1 CARCINOMA OF AXILLARY TAIL OF RIGHT BREAST IN FEMALE, ESTROGEN RECEPTOR NEGATIVE: Primary | ICD-10-CM

## 2020-10-20 LAB
ALBUMIN SERPL BCP-MCNC: 3.3 G/DL (ref 3.5–5.2)
ALP SERPL-CCNC: 87 U/L (ref 55–135)
ALT SERPL W/O P-5'-P-CCNC: 15 U/L (ref 10–44)
ANION GAP SERPL CALC-SCNC: 9 MMOL/L (ref 8–16)
AST SERPL-CCNC: 20 U/L (ref 10–40)
BILIRUB SERPL-MCNC: 0.3 MG/DL (ref 0.1–1)
BUN SERPL-MCNC: 16 MG/DL (ref 8–23)
CALCIUM SERPL-MCNC: 9 MG/DL (ref 8.7–10.5)
CHLORIDE SERPL-SCNC: 107 MMOL/L (ref 95–110)
CO2 SERPL-SCNC: 24 MMOL/L (ref 23–29)
CREAT SERPL-MCNC: 1.1 MG/DL (ref 0.5–1.4)
ERYTHROCYTE [DISTWIDTH] IN BLOOD BY AUTOMATED COUNT: 14 % (ref 11.5–14.5)
EST. GFR  (AFRICAN AMERICAN): >60 ML/MIN/1.73 M^2
EST. GFR  (NON AFRICAN AMERICAN): 53.2 ML/MIN/1.73 M^2
GLUCOSE SERPL-MCNC: 79 MG/DL (ref 70–110)
HCT VFR BLD AUTO: 36.1 % (ref 37–48.5)
HGB BLD-MCNC: 11.3 G/DL (ref 12–16)
IMM GRANULOCYTES # BLD AUTO: 0.01 K/UL (ref 0–0.04)
MCH RBC QN AUTO: 29.1 PG (ref 27–31)
MCHC RBC AUTO-ENTMCNC: 31.3 G/DL (ref 32–36)
MCV RBC AUTO: 93 FL (ref 82–98)
NEUTROPHILS # BLD AUTO: 2.1 K/UL (ref 1.8–7.7)
PLATELET # BLD AUTO: 219 K/UL (ref 150–350)
PMV BLD AUTO: 9.9 FL (ref 9.2–12.9)
POTASSIUM SERPL-SCNC: 4 MMOL/L (ref 3.5–5.1)
PROT SERPL-MCNC: 6.8 G/DL (ref 6–8.4)
RBC # BLD AUTO: 3.88 M/UL (ref 4–5.4)
SODIUM SERPL-SCNC: 140 MMOL/L (ref 136–145)
WBC # BLD AUTO: 4.12 K/UL (ref 3.9–12.7)

## 2020-10-20 PROCEDURE — 99999 PR PBB SHADOW E&M-EST. PATIENT-LVL IV: CPT | Mod: PBBFAC,,, | Performed by: INTERNAL MEDICINE

## 2020-10-20 PROCEDURE — 99999 PR PBB SHADOW E&M-EST. PATIENT-LVL IV: ICD-10-PCS | Mod: PBBFAC,,, | Performed by: INTERNAL MEDICINE

## 2020-10-20 PROCEDURE — 36415 COLL VENOUS BLD VENIPUNCTURE: CPT

## 2020-10-20 PROCEDURE — 80053 COMPREHEN METABOLIC PANEL: CPT

## 2020-10-20 PROCEDURE — 99214 OFFICE O/P EST MOD 30 MIN: CPT | Mod: PBBFAC | Performed by: INTERNAL MEDICINE

## 2020-10-20 PROCEDURE — 85027 COMPLETE CBC AUTOMATED: CPT

## 2020-10-20 PROCEDURE — 99214 PR OFFICE/OUTPT VISIT, EST, LEVL IV, 30-39 MIN: ICD-10-PCS | Mod: S$PBB,,, | Performed by: INTERNAL MEDICINE

## 2020-10-20 PROCEDURE — 99214 OFFICE O/P EST MOD 30 MIN: CPT | Mod: S$PBB,,, | Performed by: INTERNAL MEDICINE

## 2020-10-20 RX ORDER — HYDROCODONE BITARTRATE AND ACETAMINOPHEN 5; 325 MG/1; MG/1
1 TABLET ORAL EVERY 6 HOURS PRN
Qty: 30 TABLET | Refills: 0 | Status: SHIPPED | OUTPATIENT
Start: 2020-10-20 | End: 2020-10-20

## 2020-10-20 RX ORDER — HYDROCODONE BITARTRATE AND ACETAMINOPHEN 5; 325 MG/1; MG/1
1 TABLET ORAL EVERY 6 HOURS PRN
Qty: 30 TABLET | Refills: 0 | Status: SHIPPED | OUTPATIENT
Start: 2020-10-20 | End: 2022-01-18

## 2020-10-20 NOTE — PROGRESS NOTES
Subjective:       Patient ID: Briseyda Rojas is a 64 y.o. female.    Chief Complaint: No chief complaint on file.        Briseyda Rojas presents today for follow-up.  She is due to begin her fifth cycle of xeloda in the adjuvant setting.  Prior to initiating xeloda, she had completed her radiation therapy.  Of note, on April 3, 2020 she underwent a right mastectomy and AND.  She had DCIS and two residual foci of invasive carcinoma measuring 3 mm and 5 mm respectively.  Three of 6 lymph nodes were positive.  She started radiation therapy in late May, and she completed on July 3rd.  She is here to discuss further adjuvant chemotherapy options.    Briefly, she is a 64 y.o. female whom I saw initially on October 22, 2019 for a recently diagnosed triple negative right breast cancer.  She underwent mammography and ultrasound in early September 2019 which showed architectural distortion in the right upper lateral breast and pleomorphic calcifications in the right upper mid breast.  Of note, this was her first mammogram ever.  Ultrasound of the axilla was also performed which showed a prominent lymph node.  She underwent biopsy of all 3 lesions. The right upper lateral lesion returned as DCIS with comedo necrosis.  The right upper mid breast came back as normal.  The right axillary lymph node returned as infiltrating ductal carcinoma, ER negative, UT negative, HER2 equivocal but negative by FISH.  She was been seen by Dr. Raya and she was referred for consideration of neoadjuvant chemotherapy.  She completed 4 cycles of neoadjuvant Adriamycin and cyclophosphamide and 4 cycles of taxol.  She subsequently underwent a right mastectomy with results as above.  As mentioned above, she has now completed her radiation therapy.  Her CBC today shows a WBC of 4,100 per cubic mm, hemoglobin 11.3 grams/deciliter, hematocrit 36.1 % and platelets 219,000 per cubic mm.    Review of Systems    Overall she feels OK however, she states for the  last 3 days she has been experiencing persistent right chest wall pain.  She has taken nonsteroidals without relief.  She denies any injury to the area.  She appears anxious but she denies any depression, easy bruising, chills, night  sweats,  fever, constipation, diplopia,  blurred vision, headache, palpitations, shortness of breath, left breast pain, abdominal pain, upper extremity pain, and she does have difficulty ambulating.  The remainder of the ten-point ROS, including general, skin, lymph, H/N, cardiorespiratory, GI, , Neuro, Endocrine, and psychiatric is negative.     Objective:      Physical Exam      She is alert, oriented to time, place, person, appears anxious but she is in no acute distress however, she complains of pain when I tried to even palpate the right chest wall.                           VITAL SIGNS:  Reviewed                                      HEENT:  Normal.  There are no nasal, oral, lip, gingival, auricular, or lid lesions.  She does have pterygia on both eyes.  Mucosae are moist and pink, and there is no        thrush.  Pupils are equal, reactive to light and accommodation.              Extraocular muscle movements are intact.  Dentition is poor with several decayed teeth.  There is no frontal or maxillary tenderness.                                     NECK:  Supple without JVD, adenopathy, or thyromegaly.                       LUNGS:  Clear to auscultation without wheezing, rales, or rhonchi.           CARDIOVASCULAR:  Reveals an S1, S2, no murmurs, no rubs, no gallops.         ABDOMEN:  Soft, nontender, without organomegaly.  Bowel sounds are    present.                                                                     EXTREMITIES:  No cyanosis, clubbing, or edema.                                BREASTS:   she is now status post right mastectomy.  Her incision has healed nicely and there is no evidence for chest wall recurrence.       There are no masses in the left breast.  A  left-sided Port-A-Cath again identified.  She appears to have extreme tenderness to palpation of the right ribs anterior laterally.  No crepitus.                             LYMPHATIC:  There is no cervical, left axillary, or supraclavicular adenopathy.   SKIN:  Warm and moist, without petechiae, rashes, induration, or ecchymoses.  There is hyperpigmentation within the radiation therapy on the right anterior chest wall with a small area of hyperpigmentation on her back.            NEUROLOGIC:  DTRs are 0-1+ bilaterally, symmetrical, motor function is 5/5,  and cranial nerves are  within normal limits.    Assessment:       1. 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  Patient is on apixaban.     4.      Chest wall pain of recent onset and unclear etiology      Plan:       I had a long discussion with her.  She will restart Xeloda today and I will see her with repeat labs in 3 weeks for cycle 6.  We will administer a total of 6 cycles.  In regards to her chest pain, I have given her prescription for 30 Norco tablets with instructions to take 1 every 6 hr as needed.  If 3 weeks for now the symptoms have not improved, we will obtain a bone scan.  Finally, we will ask the interventional radiology colleagues to remove her port.  Of note, she is currently on apixaban for a catheter-related thrombosis.  She will need to discontinue apixaban 2 days prior to the port removal and there will be no need for her to restart it.    Her multiple questions were answered to her satisfaction.

## 2020-10-20 NOTE — Clinical Note
See me in 3 weeks with labs.  Please refer to Interventional Radiology for port removal.  Orders are in

## 2020-10-20 NOTE — TELEPHONE ENCOUNTER
Call attempt #1: Called patient and verified . While talking to patient she asked for a call back and call was abruptly disconnected. Will attempt again in a couple of days at a more convenient time.

## 2020-10-28 ENCOUNTER — SPECIALTY PHARMACY (OUTPATIENT)
Dept: PHARMACY | Facility: CLINIC | Age: 65
End: 2020-10-28

## 2020-10-28 NOTE — TELEPHONE ENCOUNTER
Attempted to call patient for follow up multiple times to discuss adherence. MD is aware that patient has missed about 2 months of Xeloda and at last appt, she has agreed to restart. After 3 attempts, spoke to , who asked for a follow up next month. Will attempt to discuss medication again around 11/20. Patient also has plenty of medication on hand and not in need of a refill anytime soon as she still has the last 2 cycles of Xeloda on hand.  does not have enough knowledge about how the patient is taking her medication for [facility] to complete follow up and patient is never available to speak on the phone.

## 2020-10-30 ENCOUNTER — HOSPITAL ENCOUNTER (OUTPATIENT)
Dept: RADIOLOGY | Facility: HOSPITAL | Age: 65
Discharge: HOME | End: 2020-10-30
Attending: SURGERY
Payer: MEDICAID

## 2020-10-30 DIAGNOSIS — Z12.31 SCREENING MAMMOGRAM, ENCOUNTER FOR: ICD-10-CM

## 2020-10-30 PROCEDURE — 77067 SCR MAMMO BI INCL CAD: CPT | Mod: 26,,, | Performed by: RADIOLOGY

## 2020-10-30 PROCEDURE — 77067 MAMMO DIGITAL SCREENING LEFT WITH TOMO: ICD-10-PCS | Mod: 26,,, | Performed by: RADIOLOGY

## 2020-10-30 PROCEDURE — 77063 MAMMO DIGITAL SCREENING LEFT WITH TOMO: ICD-10-PCS | Mod: 26,,, | Performed by: RADIOLOGY

## 2020-10-30 PROCEDURE — 77067 SCR MAMMO BI INCL CAD: CPT | Mod: TC

## 2020-10-30 PROCEDURE — 77063 BREAST TOMOSYNTHESIS BI: CPT | Mod: 26,,, | Performed by: RADIOLOGY

## 2020-11-03 ENCOUNTER — TELEPHONE (OUTPATIENT)
Dept: RADIOLOGY | Facility: HOSPITAL | Age: 65
End: 2020-11-03

## 2020-11-03 ENCOUNTER — TELEPHONE (OUTPATIENT)
Dept: INTERVENTIONAL RADIOLOGY/VASCULAR | Facility: HOSPITAL | Age: 65
End: 2020-11-03

## 2020-11-03 DIAGNOSIS — Z17.1 CARCINOMA OF AXILLARY TAIL OF RIGHT BREAST IN FEMALE, ESTROGEN RECEPTOR NEGATIVE: Primary | ICD-10-CM

## 2020-11-03 DIAGNOSIS — C50.611 CARCINOMA OF AXILLARY TAIL OF RIGHT BREAST IN FEMALE, ESTROGEN RECEPTOR NEGATIVE: Primary | ICD-10-CM

## 2020-11-03 NOTE — TELEPHONE ENCOUNTER
Spoke with  Bob and scheduled abnormal mammogram follow up at the breast center for his wife on 11/10/2020 per request.

## 2020-11-09 NOTE — PROGRESS NOTES
Subjective:       Patient ID: Briseyda Rojas is a 64 y.o. female.    Chief Complaint: No chief complaint on file.        Briseyda Rojas presents today for follow-up.  She is due to begin her sixth cycle of xeloda in the adjuvant setting.  Prior to initiating xeloda, she had completed her radiation therapy.  Of note, on April 3, 2020 she underwent a right mastectomy and AND.  She had DCIS and two residual foci of invasive carcinoma measuring 3 mm and 5 mm respectively.  Three of 6 lymph nodes were positive.  She started radiation therapy in late May, and she completed on July 3rd.  Subsequent to the radiation she was started on adjuvant capecitabine and she is due for her 6th cycle today     Briefly, she is a 64 y.o. female whom I saw initially on October 22, 2019 for a recently diagnosed triple negative right breast cancer.  She underwent mammography and ultrasound in early September 2019 which showed architectural distortion in the right upper lateral breast and pleomorphic calcifications in the right upper mid breast.  Of note, this was her first mammogram ever.  Ultrasound of the axilla was also performed which showed a prominent lymph node.  She underwent biopsy of all 3 lesions. The right upper lateral lesion returned as DCIS with comedo necrosis.  The right upper mid breast came back as normal.  The right axillary lymph node returned as infiltrating ductal carcinoma, ER negative, MO negative, HER2 equivocal but negative by FISH.  She was been seen by Dr. Raya and she was referred for consideration of neoadjuvant chemotherapy.  She completed 4 cycles of neoadjuvant Adriamycin and cyclophosphamide and 4 cycles of taxol.  She subsequently underwent a right mastectomy with results as above.  As mentioned above, she has now completed her radiation therapy.  Her CBC today shows a WBC of 4,200 per cubic mm, hemoglobin 12.0 grams/deciliter, hematocrit 39.2 % and platelets 226,000 per cubic mm. Her ANC is 2,200 /mm3.    LFTs are normal.   She is scheduled for removal of her port later today.    Review of Systems    Overall she feels OK however, she again complains of persistent right chest wall pain.  She has taken nonsteroidals without relief.  She denies any injury to the area.  She appears anxious but she denies any depression, easy bruising, chills, night  sweats,  fever, constipation, diplopia,  blurred vision, headache, palpitations, shortness of breath, left breast pain, abdominal pain, upper extremity pain, and she does have difficulty ambulating.  The remainder of the ten-point ROS, including general, skin, lymph, H/N, cardiorespiratory, GI, , Neuro, Endocrine, and psychiatric is negative.     Objective:      Physical Exam      She is alert, oriented to time, place, person, appears anxious but she is in no acute distress however, she complains of pain when I tried to even palpate the right chest wall.                           VITAL SIGNS:  Reviewed                                      HEENT:  Normal.  There are no nasal, oral, lip, gingival, auricular, or lid lesions.  She does have pterygia on both eyes.  Mucosae are moist and pink, and there is no        thrush.  Pupils are equal, reactive to light and accommodation.              Extraocular muscle movements are intact.  Dentition is poor with several decayed teeth.  There is no frontal or maxillary tenderness.                                     NECK:  Supple without JVD, adenopathy, or thyromegaly.                       LUNGS:  Clear to auscultation without wheezing, rales, or rhonchi.           CARDIOVASCULAR:  Reveals an S1, S2, no murmurs, no rubs, no gallops.         ABDOMEN:  Soft, nontender, without organomegaly.  Bowel sounds are    present.                                                                     EXTREMITIES:  No cyanosis, clubbing, or edema.                                BREASTS:   she is now status post right mastectomy.  Her incision has  healed nicely and there is no evidence for chest wall recurrence.       There are no masses in the left breast.  A left-sided Port-A-Cath again identified.  She appears to have extreme tenderness to palpation of the right ribs anterior laterally.  No crepitus.                             LYMPHATIC:  There is no cervical, left axillary, or supraclavicular adenopathy.   SKIN:  Warm and moist, without petechiae, rashes, induration, or ecchymoses.  There is hyperpigmentation within the radiation therapy on the right anterior chest wall with a small area of hyperpigmentation on her back.            NEUROLOGIC:  DTRs are 0-1+ bilaterally, symmetrical, motor function is 5/5,  and cranial nerves are  within normal limits.    Assessment:       1. 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  Patient is on apixaban.     4.      Chest wall pain of recent onset and unclear etiology      Plan:       I had a long discussion with her.  She will restart Xeloda today and she may proceed with a port removal as planned.  In regards to her chest pain, out of abundance of caution since she is considered a high risk patient, we will proceed with a bone scan ASAP.  Assuming her bone scan is negative, I will see her in 6 weeks for follow-up.  Her multiple questions were answered to her satisfaction.      11/12/2020 5:29 p.m. ADDENDUM  MRI results noted.  A BI-RADS 4 lesion is seen in the left breast and the patient will be scheduled for a biopsy.

## 2020-11-10 ENCOUNTER — TELEPHONE (OUTPATIENT)
Dept: HEMATOLOGY/ONCOLOGY | Facility: CLINIC | Age: 65
End: 2020-11-10

## 2020-11-10 ENCOUNTER — OFFICE VISIT (OUTPATIENT)
Dept: HEMATOLOGY/ONCOLOGY | Facility: CLINIC | Age: 65
End: 2020-11-10
Payer: MEDICARE

## 2020-11-10 ENCOUNTER — TELEPHONE (OUTPATIENT)
Dept: INTERVENTIONAL RADIOLOGY/VASCULAR | Facility: HOSPITAL | Age: 65
End: 2020-11-10

## 2020-11-10 ENCOUNTER — HOSPITAL ENCOUNTER (OUTPATIENT)
Dept: RADIOLOGY | Facility: HOSPITAL | Age: 65
Discharge: HOME | End: 2020-11-10
Attending: SURGERY
Payer: MEDICAID

## 2020-11-10 VITALS
HEART RATE: 70 BPM | WEIGHT: 108.44 LBS | SYSTOLIC BLOOD PRESSURE: 144 MMHG | HEIGHT: 66 IN | RESPIRATION RATE: 18 BRPM | BODY MASS INDEX: 17.43 KG/M2 | OXYGEN SATURATION: 100 % | TEMPERATURE: 98 F | DIASTOLIC BLOOD PRESSURE: 81 MMHG

## 2020-11-10 DIAGNOSIS — R92.8 ABNORMAL MAMMOGRAM: ICD-10-CM

## 2020-11-10 DIAGNOSIS — C50.611 CARCINOMA OF AXILLARY TAIL OF RIGHT BREAST IN FEMALE, ESTROGEN RECEPTOR NEGATIVE: Primary | ICD-10-CM

## 2020-11-10 DIAGNOSIS — T45.1X5A ANEMIA ASSOCIATED WITH CHEMOTHERAPY: ICD-10-CM

## 2020-11-10 DIAGNOSIS — D64.81 ANEMIA ASSOCIATED WITH CHEMOTHERAPY: ICD-10-CM

## 2020-11-10 DIAGNOSIS — Z17.1 CARCINOMA OF AXILLARY TAIL OF RIGHT BREAST IN FEMALE, ESTROGEN RECEPTOR NEGATIVE: Primary | ICD-10-CM

## 2020-11-10 PROCEDURE — 99214 PR OFFICE/OUTPT VISIT, EST, LEVL IV, 30-39 MIN: ICD-10-PCS | Mod: S$PBB,,, | Performed by: INTERNAL MEDICINE

## 2020-11-10 PROCEDURE — 99214 OFFICE O/P EST MOD 30 MIN: CPT | Mod: S$PBB,,, | Performed by: INTERNAL MEDICINE

## 2020-11-10 PROCEDURE — 99214 OFFICE O/P EST MOD 30 MIN: CPT | Mod: PBBFAC | Performed by: INTERNAL MEDICINE

## 2020-11-10 PROCEDURE — 77061 BREAST TOMOSYNTHESIS UNI: CPT | Mod: 26,LT,, | Performed by: RADIOLOGY

## 2020-11-10 PROCEDURE — 77061 BREAST TOMOSYNTHESIS UNI: CPT | Mod: TC,LT

## 2020-11-10 PROCEDURE — 77065 MAMMO DIGITAL DIAGNOSTIC LEFT WITH TOMO: ICD-10-PCS | Mod: 26,LT,, | Performed by: RADIOLOGY

## 2020-11-10 PROCEDURE — 99999 PR PBB SHADOW E&M-EST. PATIENT-LVL IV: ICD-10-PCS | Mod: PBBFAC,,, | Performed by: INTERNAL MEDICINE

## 2020-11-10 PROCEDURE — 77065 DX MAMMO INCL CAD UNI: CPT | Mod: 26,LT,, | Performed by: RADIOLOGY

## 2020-11-10 PROCEDURE — 77065 DX MAMMO INCL CAD UNI: CPT | Mod: TC,LT

## 2020-11-10 PROCEDURE — 77061 MAMMO DIGITAL DIAGNOSTIC LEFT WITH TOMO: ICD-10-PCS | Mod: 26,LT,, | Performed by: RADIOLOGY

## 2020-11-10 PROCEDURE — 99999 PR PBB SHADOW E&M-EST. PATIENT-LVL IV: CPT | Mod: PBBFAC,,, | Performed by: INTERNAL MEDICINE

## 2020-11-10 RX ORDER — CAPECITABINE 500 MG/1
1000 TABLET, FILM COATED ORAL 2 TIMES DAILY
Qty: 84 TABLET | Refills: 0 | Status: SHIPPED | OUTPATIENT
Start: 2020-11-10 | End: 2021-11-10

## 2020-11-10 NOTE — TELEPHONE ENCOUNTER
"Returned call to pt , Torin.   Torin calling to inform Dr. De Leon "secretly" that he does not want Dr. De Leon to send in any more pain prescriptions for pt.   Torin stated that "pt does not need them and he does not want her on them."  Torin also states "if I have to, I will come up there and express my feelings."   Informed Torin would notify Dr. De Leon and Dr. De Leon could call and discuss if needed.  Torin verbalized understanding and thanked nurse.      Message fwd.      ----- Message from Linda Dacosta sent at 11/10/2020  8:56 AM CST -----  Contact: Torin (spouse)  Patient Advice/Staff Message     Caller name/title: Torin (spouse)      Reason for call: Calling to speak with the RN, says that his wife doesn't need to get any of her pain meds refilled "She's not doing the right thing with them". Pt may be abusing the medication.     Do you feel you need to be seen today:: No        Communication Preference: [phone]    Additional Information:         "

## 2020-11-11 ENCOUNTER — TELEPHONE (OUTPATIENT)
Dept: RADIOLOGY | Facility: HOSPITAL | Age: 65
End: 2020-11-11

## 2020-11-11 NOTE — TELEPHONE ENCOUNTER
Spoke with patient's . Reviewed breast biopsy procedure and reviewed instructions for breast biopsy. Patient's   expressed understanding and all questions were answered. Provided patient's  with my phone number to call for any further concerns or questions.   Patient is scheduled for her breast biopsy at the [facility] on 11/24/2020.

## 2020-11-12 ENCOUNTER — TELEPHONE (OUTPATIENT)
Dept: HEMATOLOGY/ONCOLOGY | Facility: CLINIC | Age: 65
End: 2020-11-12

## 2020-11-12 NOTE — TELEPHONE ENCOUNTER
Called pt  and informed that would cancel 11/17 appt at this time as Dr. De Leon would like to keep port in place for now.  Pt  verbalized understanding.  Appt cancelled.      ----- Message from Varun Armas MD sent at 11/11/2020  5:31 PM CST -----  Regarding: RE: Surgery  That is an excellent idea!  ----- Message -----  From: Rachael Arechiga  Sent: 11/11/2020  11:32 AM CST  To: Varun Armas MD  Subject: FW: Surgery                                      11/17 procedure is for port removal- should she keep her port in place for now? Not sure what bx will show us and may need more chemo?  ----- Message -----  From: Arpit Caldwell LPN  Sent: 11/11/2020  11:28 AM CST  To: [facility] Staff  Subject: Surgery                                          Patient is scheduled for a breast biopsy on 11/24/2020, the  is requesting a call back in reference to her scheduled surgery on 11/17/2020.  Thank you

## 2020-11-20 ENCOUNTER — SPECIALTY PHARMACY (OUTPATIENT)
Dept: PHARMACY | Facility: CLINIC | Age: 65
End: 2020-11-20

## 2020-11-20 ENCOUNTER — TELEPHONE (OUTPATIENT)
Dept: HEMATOLOGY/ONCOLOGY | Facility: CLINIC | Age: 65
End: 2020-11-20

## 2020-11-20 NOTE — TELEPHONE ENCOUNTER
"Called patient and spoke to . New insurance. Does not require a PA. Covered under Part B. He was not able to give [facility] new insurance card information. Plans to have patient give us a call back.     Previously, patient and  stated that she had "bottles and bottles" of Xeloda on hand and declined multiple times for [facility] to ship out the medication. At call today,  reported that patient has "run out" of medication and does not know when. Asked for  to have patient give [facility] a call back ASAP.     Unable to complete follow up at call today. Will continue to follow up and reach out again next week.   "

## 2020-11-20 NOTE — TELEPHONE ENCOUNTER
Call returned to . No answer. Phone cut off after one ring. Will reach out again to discuss.     ----- Message from Rose Mensah sent at 11/20/2020 12:51 PM CST -----  Contact: pt   Please call pt  at [phone]    Patient  would like to speak to the nurse regarding the patient insurance information (lost the Medicare card)    Patient  insist on speaking the nurse only    Thank you

## 2020-11-23 ENCOUNTER — DOCUMENTATION ONLY (OUTPATIENT)
Dept: HEMATOLOGY/ONCOLOGY | Facility: CLINIC | Age: 65
End: 2020-11-23

## 2020-11-23 NOTE — PROGRESS NOTES
KIMBERLEE called pt and informed her there is a Thanksgiving basket for her here at [facility]. She will be here for an appt tomorrow and will pick it up then.  KIMBERLEE remains available.

## 2020-11-24 ENCOUNTER — HOSPITAL ENCOUNTER (OUTPATIENT)
Dept: RADIOLOGY | Facility: HOSPITAL | Age: 65
Discharge: HOME | End: 2020-11-24
Attending: SURGERY
Payer: MEDICARE

## 2020-11-24 DIAGNOSIS — R92.8 ABNORMAL MAMMOGRAM: ICD-10-CM

## 2020-11-24 PROCEDURE — 25000003 PHARM REV CODE 250: Performed by: SURGERY

## 2020-11-24 PROCEDURE — 19081 MAMMO BREAST STEREOTACTIC BREAST BIOPSY LEFT: ICD-10-PCS | Mod: LT,,, | Performed by: RADIOLOGY

## 2020-11-24 PROCEDURE — 27200939 MAMMO BREAST STEREOTACTIC BREAST BIOPSY LEFT

## 2020-11-24 PROCEDURE — 19081 BX BREAST 1ST LESION STRTCTC: CPT | Mod: LT,,, | Performed by: RADIOLOGY

## 2020-11-24 PROCEDURE — 88305 TISSUE EXAM BY PATHOLOGIST: CPT | Mod: 26,,, | Performed by: PATHOLOGY

## 2020-11-24 PROCEDURE — 88305 TISSUE EXAM BY PATHOLOGIST: ICD-10-PCS | Mod: 26,,, | Performed by: PATHOLOGY

## 2020-11-24 PROCEDURE — 88305 TISSUE EXAM BY PATHOLOGIST: CPT | Performed by: PATHOLOGY

## 2020-11-24 RX ORDER — LIDOCAINE HYDROCHLORIDE 10 MG/ML
5 INJECTION, SOLUTION EPIDURAL; INFILTRATION; INTRACAUDAL; PERINEURAL ONCE
Status: COMPLETED | OUTPATIENT
Start: 2020-11-24 | End: 2020-11-24

## 2020-11-24 RX ORDER — LIDOCAINE HYDROCHLORIDE 20 MG/ML
2 INJECTION, SOLUTION INFILTRATION; PERINEURAL ONCE
Status: DISCONTINUED | OUTPATIENT
Start: 2020-11-24 | End: 2020-11-25 | Stop reason: HOSPADM

## 2020-11-24 RX ORDER — LIDOCAINE HYDROCHLORIDE AND EPINEPHRINE 20; 10 MG/ML; UG/ML
20 INJECTION, SOLUTION INFILTRATION; PERINEURAL ONCE
Status: DISCONTINUED | OUTPATIENT
Start: 2020-11-24 | End: 2020-11-25 | Stop reason: HOSPADM

## 2020-11-24 RX ORDER — LIDOCAINE HYDROCHLORIDE AND EPINEPHRINE 20; 10 MG/ML; UG/ML
18 INJECTION, SOLUTION INFILTRATION; PERINEURAL ONCE
Status: COMPLETED | OUTPATIENT
Start: 2020-11-24 | End: 2020-11-24

## 2020-11-24 RX ADMIN — LIDOCAINE HYDROCHLORIDE,EPINEPHRINE BITARTRATE 18 ML: 20; .01 INJECTION, SOLUTION INFILTRATION; PERINEURAL at 09:11

## 2020-11-24 RX ADMIN — LIDOCAINE HYDROCHLORIDE 50 MG: 10 INJECTION, SOLUTION EPIDURAL; INFILTRATION; INTRACAUDAL; PERINEURAL at 10:11

## 2020-11-24 NOTE — TELEPHONE ENCOUNTER
"Called patient on 11/20 and spoke to . He was not able to give patient's new insurance card.     Previously, patient and  stated that she had "bottles and bottles" of Xeloda on hand and declined multiple times for [facility] to ship out the medication. At call today,  reported that patient has "run out" of medication and does not know when. Asked for  to have patient give [facility] a call back ASAP. MD was made aware of situation as patient continues to be non-adherent to medication.     Called and LVM on 11/24 to see if there were any updates with the insurance information. MD is aware of situation.   "

## 2020-11-27 LAB
FINAL PATHOLOGIC DIAGNOSIS: NORMAL
GROSS: NORMAL

## 2020-11-30 ENCOUNTER — TELEPHONE (OUTPATIENT)
Dept: SURGERY | Facility: CLINIC | Age: 65
End: 2020-11-30

## 2020-11-30 NOTE — TELEPHONE ENCOUNTER
Patient's  called with results of breast biopsy from 11/24/2020,   no atypia/benign, all questions answered, pt advised to follow up in 6 months with repeat imaging per core biopsy protocol.  reviewed biopsy results and results are benign and concordant. Patient verbalized understanding.

## 2020-12-01 NOTE — TELEPHONE ENCOUNTER
"Called mobile number and no VM set up. Called home number and spoke to . Explained that we are still waiting on patients new insurance card in order to set up her refill for Xeloda.  stated that he "would need to call [us] back." He confirmed he had [facility] correct phone number and information to call us back.   "

## 2020-12-07 NOTE — TELEPHONE ENCOUNTER
Received insurance card information from  today. Running test claim, we are getting a payable claim. Working on benefits investigation now. Messaged MD to notify office that we were able to obtain all information to get script approved and set up refill, but seeing if medication is still appropriate since patient has been off medication for quite some time.

## 2020-12-08 NOTE — TELEPHONE ENCOUNTER
MD confirmed that patient has completed chemo and will not be in need of any refills. Will close out at [facility] as Xeloda is no longer needed. Spoke with  and he confirmed understanding. Patient does not have any remaining tablets on hand and therefore disposal was not reviewed at this time.  is aware to contact [facility] for any questions or concerns.

## 2021-01-12 ENCOUNTER — TELEPHONE (OUTPATIENT)
Dept: HEMATOLOGY/ONCOLOGY | Facility: CLINIC | Age: 66
End: 2021-01-12

## 2021-01-26 ENCOUNTER — TELEPHONE (OUTPATIENT)
Dept: SURGERY | Facility: CLINIC | Age: 66
End: 2021-01-26

## 2021-01-26 ENCOUNTER — TELEPHONE (OUTPATIENT)
Dept: HEMATOLOGY/ONCOLOGY | Facility: CLINIC | Age: 66
End: 2021-01-26

## 2021-01-27 ENCOUNTER — TELEPHONE (OUTPATIENT)
Dept: SURGERY | Facility: CLINIC | Age: 66
End: 2021-01-27

## 2021-01-28 ENCOUNTER — OFFICE VISIT (OUTPATIENT)
Dept: HEMATOLOGY/ONCOLOGY | Facility: CLINIC | Age: 66
End: 2021-01-28
Payer: MEDICARE

## 2021-01-28 VITALS
RESPIRATION RATE: 18 BRPM | HEART RATE: 80 BPM | SYSTOLIC BLOOD PRESSURE: 164 MMHG | OXYGEN SATURATION: 100 % | DIASTOLIC BLOOD PRESSURE: 79 MMHG | BODY MASS INDEX: 19.46 KG/M2 | HEIGHT: 66 IN | WEIGHT: 121.06 LBS | TEMPERATURE: 98 F

## 2021-01-28 DIAGNOSIS — Z17.1 CARCINOMA OF AXILLARY TAIL OF RIGHT BREAST IN FEMALE, ESTROGEN RECEPTOR NEGATIVE: Primary | ICD-10-CM

## 2021-01-28 DIAGNOSIS — T45.1X5A ANEMIA ASSOCIATED WITH CHEMOTHERAPY: ICD-10-CM

## 2021-01-28 DIAGNOSIS — D64.81 ANEMIA ASSOCIATED WITH CHEMOTHERAPY: ICD-10-CM

## 2021-01-28 DIAGNOSIS — C50.611 CARCINOMA OF AXILLARY TAIL OF RIGHT BREAST IN FEMALE, ESTROGEN RECEPTOR NEGATIVE: Primary | ICD-10-CM

## 2021-01-28 PROCEDURE — 3078F PR MOST RECENT DIASTOLIC BLOOD PRESSURE < 80 MM HG: ICD-10-PCS | Mod: CPTII,S$GLB,, | Performed by: INTERNAL MEDICINE

## 2021-01-28 PROCEDURE — 3288F FALL RISK ASSESSMENT DOCD: CPT | Mod: CPTII,S$GLB,, | Performed by: INTERNAL MEDICINE

## 2021-01-28 PROCEDURE — 1125F AMNT PAIN NOTED PAIN PRSNT: CPT | Mod: S$GLB,,, | Performed by: INTERNAL MEDICINE

## 2021-01-28 PROCEDURE — 99999 PR PBB SHADOW E&M-EST. PATIENT-LVL IV: CPT | Mod: PBBFAC,,, | Performed by: INTERNAL MEDICINE

## 2021-01-28 PROCEDURE — 99214 OFFICE O/P EST MOD 30 MIN: CPT | Mod: S$GLB,,, | Performed by: INTERNAL MEDICINE

## 2021-01-28 PROCEDURE — 1101F PT FALLS ASSESS-DOCD LE1/YR: CPT | Mod: CPTII,S$GLB,, | Performed by: INTERNAL MEDICINE

## 2021-01-28 PROCEDURE — 99214 PR OFFICE/OUTPT VISIT, EST, LEVL IV, 30-39 MIN: ICD-10-PCS | Mod: S$GLB,,, | Performed by: INTERNAL MEDICINE

## 2021-01-28 PROCEDURE — 3288F PR FALLS RISK ASSESSMENT DOCUMENTED: ICD-10-PCS | Mod: CPTII,S$GLB,, | Performed by: INTERNAL MEDICINE

## 2021-01-28 PROCEDURE — 3008F BODY MASS INDEX DOCD: CPT | Mod: CPTII,S$GLB,, | Performed by: INTERNAL MEDICINE

## 2021-01-28 PROCEDURE — 99999 PR PBB SHADOW E&M-EST. PATIENT-LVL IV: ICD-10-PCS | Mod: PBBFAC,,, | Performed by: INTERNAL MEDICINE

## 2021-01-28 PROCEDURE — 3077F SYST BP >= 140 MM HG: CPT | Mod: CPTII,S$GLB,, | Performed by: INTERNAL MEDICINE

## 2021-01-28 PROCEDURE — 1101F PR PT FALLS ASSESS DOC 0-1 FALLS W/OUT INJ PAST YR: ICD-10-PCS | Mod: CPTII,S$GLB,, | Performed by: INTERNAL MEDICINE

## 2021-01-28 PROCEDURE — 3077F PR MOST RECENT SYSTOLIC BLOOD PRESSURE >= 140 MM HG: ICD-10-PCS | Mod: CPTII,S$GLB,, | Performed by: INTERNAL MEDICINE

## 2021-01-28 PROCEDURE — 1125F PR PAIN SEVERITY QUANTIFIED, PAIN PRESENT: ICD-10-PCS | Mod: S$GLB,,, | Performed by: INTERNAL MEDICINE

## 2021-01-28 PROCEDURE — 3078F DIAST BP <80 MM HG: CPT | Mod: CPTII,S$GLB,, | Performed by: INTERNAL MEDICINE

## 2021-01-28 PROCEDURE — 3008F PR BODY MASS INDEX (BMI) DOCUMENTED: ICD-10-PCS | Mod: CPTII,S$GLB,, | Performed by: INTERNAL MEDICINE

## 2021-01-28 RX ORDER — OMEPRAZOLE 20 MG/1
TABLET, DELAYED RELEASE ORAL
COMMUNITY
End: 2022-01-18

## 2021-01-28 RX ORDER — OXYCODONE HYDROCHLORIDE 5 MG/1
TABLET ORAL
COMMUNITY
End: 2022-01-18

## 2021-02-02 ENCOUNTER — HOSPITAL ENCOUNTER (OUTPATIENT)
Dept: RADIOLOGY | Facility: HOSPITAL | Age: 66
Discharge: HOME | End: 2021-02-02
Attending: INTERNAL MEDICINE
Payer: MEDICARE

## 2021-02-02 DIAGNOSIS — Z17.1 CARCINOMA OF AXILLARY TAIL OF RIGHT BREAST IN FEMALE, ESTROGEN RECEPTOR NEGATIVE: ICD-10-CM

## 2021-02-02 DIAGNOSIS — C50.611 CARCINOMA OF AXILLARY TAIL OF RIGHT BREAST IN FEMALE, ESTROGEN RECEPTOR NEGATIVE: ICD-10-CM

## 2021-02-03 ENCOUNTER — HOSPITAL ENCOUNTER (OUTPATIENT)
Dept: RADIOLOGY | Facility: HOSPITAL | Age: 66
Discharge: HOME | End: 2021-02-03
Attending: INTERNAL MEDICINE
Payer: MEDICARE

## 2021-02-03 ENCOUNTER — TELEPHONE (OUTPATIENT)
Dept: HEMATOLOGY/ONCOLOGY | Facility: CLINIC | Age: 66
End: 2021-02-03

## 2021-02-03 LAB
CREAT SERPL-MCNC: 1.1 MG/DL (ref 0.5–1.4)
SAMPLE: NORMAL

## 2021-02-03 PROCEDURE — 70553 MRI BRAIN W WO CONTRAST: ICD-10-PCS | Mod: 26,,, | Performed by: RADIOLOGY

## 2021-02-03 PROCEDURE — 25500020 PHARM REV CODE 255: Performed by: INTERNAL MEDICINE

## 2021-02-03 PROCEDURE — 70553 MRI BRAIN STEM W/O & W/DYE: CPT | Mod: 26,,, | Performed by: RADIOLOGY

## 2021-02-03 PROCEDURE — A9585 GADOBUTROL INJECTION: HCPCS | Performed by: INTERNAL MEDICINE

## 2021-02-03 PROCEDURE — 70553 MRI BRAIN STEM W/O & W/DYE: CPT | Mod: TC

## 2021-02-03 RX ORDER — GADOBUTROL 604.72 MG/ML
6 INJECTION INTRAVENOUS
Status: COMPLETED | OUTPATIENT
Start: 2021-02-03 | End: 2021-02-03

## 2021-02-03 RX ADMIN — GADOBUTROL 6 ML: 604.72 INJECTION INTRAVENOUS at 07:02

## 2021-02-04 ENCOUNTER — OFFICE VISIT (OUTPATIENT)
Dept: SURGERY | Facility: CLINIC | Age: 66
End: 2021-02-04
Payer: MEDICARE

## 2021-02-04 VITALS
HEART RATE: 73 BPM | DIASTOLIC BLOOD PRESSURE: 90 MMHG | HEIGHT: 66 IN | BODY MASS INDEX: 19.61 KG/M2 | SYSTOLIC BLOOD PRESSURE: 180 MMHG | WEIGHT: 122 LBS

## 2021-02-04 DIAGNOSIS — Z95.828 PORT-A-CATH IN PLACE: ICD-10-CM

## 2021-02-04 DIAGNOSIS — Z17.1 CARCINOMA OF AXILLARY TAIL OF RIGHT BREAST IN FEMALE, ESTROGEN RECEPTOR NEGATIVE: ICD-10-CM

## 2021-02-04 DIAGNOSIS — I10 HYPERTENSION, UNSPECIFIED TYPE: Primary | ICD-10-CM

## 2021-02-04 DIAGNOSIS — C50.611 CARCINOMA OF AXILLARY TAIL OF RIGHT BREAST IN FEMALE, ESTROGEN RECEPTOR NEGATIVE: ICD-10-CM

## 2021-02-04 PROCEDURE — 1126F AMNT PAIN NOTED NONE PRSNT: CPT | Mod: S$GLB,,, | Performed by: SURGERY

## 2021-02-04 PROCEDURE — 3008F PR BODY MASS INDEX (BMI) DOCUMENTED: ICD-10-PCS | Mod: CPTII,S$GLB,, | Performed by: SURGERY

## 2021-02-04 PROCEDURE — 36590 REMOVAL TUNNELED CV CATH: CPT | Mod: S$GLB,,, | Performed by: SURGERY

## 2021-02-04 PROCEDURE — 3080F DIAST BP >= 90 MM HG: CPT | Mod: CPTII,S$GLB,, | Performed by: SURGERY

## 2021-02-04 PROCEDURE — 1126F PR PAIN SEVERITY QUANTIFIED, NO PAIN PRESENT: ICD-10-PCS | Mod: S$GLB,,, | Performed by: SURGERY

## 2021-02-04 PROCEDURE — 36590 PR REMOVAL TUNNELED CV CATH W SUBQ PORT OR PUMP: ICD-10-PCS | Mod: S$GLB,,, | Performed by: SURGERY

## 2021-02-04 PROCEDURE — 99999 PR PBB SHADOW E&M-EST. PATIENT-LVL IV: ICD-10-PCS | Mod: PBBFAC,,, | Performed by: SURGERY

## 2021-02-04 PROCEDURE — 3288F PR FALLS RISK ASSESSMENT DOCUMENTED: ICD-10-PCS | Mod: CPTII,S$GLB,, | Performed by: SURGERY

## 2021-02-04 PROCEDURE — 3077F PR MOST RECENT SYSTOLIC BLOOD PRESSURE >= 140 MM HG: ICD-10-PCS | Mod: CPTII,S$GLB,, | Performed by: SURGERY

## 2021-02-04 PROCEDURE — 1101F PT FALLS ASSESS-DOCD LE1/YR: CPT | Mod: CPTII,S$GLB,, | Performed by: SURGERY

## 2021-02-04 PROCEDURE — 3077F SYST BP >= 140 MM HG: CPT | Mod: CPTII,S$GLB,, | Performed by: SURGERY

## 2021-02-04 PROCEDURE — 3080F PR MOST RECENT DIASTOLIC BLOOD PRESSURE >= 90 MM HG: ICD-10-PCS | Mod: CPTII,S$GLB,, | Performed by: SURGERY

## 2021-02-04 PROCEDURE — 99999 PR PBB SHADOW E&M-EST. PATIENT-LVL IV: CPT | Mod: PBBFAC,,, | Performed by: SURGERY

## 2021-02-04 PROCEDURE — 3288F FALL RISK ASSESSMENT DOCD: CPT | Mod: CPTII,S$GLB,, | Performed by: SURGERY

## 2021-02-04 PROCEDURE — 3008F BODY MASS INDEX DOCD: CPT | Mod: CPTII,S$GLB,, | Performed by: SURGERY

## 2021-02-04 PROCEDURE — 99213 OFFICE O/P EST LOW 20 MIN: CPT | Mod: 25,S$GLB,, | Performed by: SURGERY

## 2021-02-04 PROCEDURE — 99213 PR OFFICE/OUTPT VISIT, EST, LEVL III, 20-29 MIN: ICD-10-PCS | Mod: 25,S$GLB,, | Performed by: SURGERY

## 2021-02-04 PROCEDURE — 1101F PR PT FALLS ASSESS DOC 0-1 FALLS W/OUT INJ PAST YR: ICD-10-PCS | Mod: CPTII,S$GLB,, | Performed by: SURGERY

## 2021-02-09 ENCOUNTER — TELEPHONE (OUTPATIENT)
Dept: ADMINISTRATIVE | Facility: OTHER | Age: 66
End: 2021-02-09

## 2021-04-19 ENCOUNTER — OFFICE VISIT (OUTPATIENT)
Dept: SURGERY | Facility: CLINIC | Age: 66
End: 2021-04-19
Payer: MEDICARE

## 2021-04-19 VITALS
BODY MASS INDEX: 19.77 KG/M2 | DIASTOLIC BLOOD PRESSURE: 89 MMHG | HEIGHT: 66 IN | HEART RATE: 65 BPM | SYSTOLIC BLOOD PRESSURE: 165 MMHG | WEIGHT: 123 LBS

## 2021-04-19 DIAGNOSIS — R92.8 ABNORMAL MAMMOGRAM OF LEFT BREAST: Primary | ICD-10-CM

## 2021-04-19 PROCEDURE — 99999 PR PBB SHADOW E&M-EST. PATIENT-LVL IV: ICD-10-PCS | Mod: PBBFAC,,, | Performed by: SURGERY

## 2021-04-19 PROCEDURE — 1125F PR PAIN SEVERITY QUANTIFIED, PAIN PRESENT: ICD-10-PCS | Mod: S$GLB,,, | Performed by: SURGERY

## 2021-04-19 PROCEDURE — 99213 OFFICE O/P EST LOW 20 MIN: CPT | Mod: S$GLB,,, | Performed by: SURGERY

## 2021-04-19 PROCEDURE — 3288F FALL RISK ASSESSMENT DOCD: CPT | Mod: CPTII,S$GLB,, | Performed by: SURGERY

## 2021-04-19 PROCEDURE — 99999 PR PBB SHADOW E&M-EST. PATIENT-LVL IV: CPT | Mod: PBBFAC,,, | Performed by: SURGERY

## 2021-04-19 PROCEDURE — 3008F BODY MASS INDEX DOCD: CPT | Mod: CPTII,S$GLB,, | Performed by: SURGERY

## 2021-04-19 PROCEDURE — 1101F PR PT FALLS ASSESS DOC 0-1 FALLS W/OUT INJ PAST YR: ICD-10-PCS | Mod: CPTII,S$GLB,, | Performed by: SURGERY

## 2021-04-19 PROCEDURE — 1101F PT FALLS ASSESS-DOCD LE1/YR: CPT | Mod: CPTII,S$GLB,, | Performed by: SURGERY

## 2021-04-19 PROCEDURE — 99213 PR OFFICE/OUTPT VISIT, EST, LEVL III, 20-29 MIN: ICD-10-PCS | Mod: S$GLB,,, | Performed by: SURGERY

## 2021-04-19 PROCEDURE — 1125F AMNT PAIN NOTED PAIN PRSNT: CPT | Mod: S$GLB,,, | Performed by: SURGERY

## 2021-04-19 PROCEDURE — 3008F PR BODY MASS INDEX (BMI) DOCUMENTED: ICD-10-PCS | Mod: CPTII,S$GLB,, | Performed by: SURGERY

## 2021-04-19 PROCEDURE — 3288F PR FALLS RISK ASSESSMENT DOCUMENTED: ICD-10-PCS | Mod: CPTII,S$GLB,, | Performed by: SURGERY

## 2021-05-05 ENCOUNTER — TELEPHONE (OUTPATIENT)
Dept: SURGERY | Facility: CLINIC | Age: 66
End: 2021-05-05

## 2023-01-23 ENCOUNTER — TELEPHONE (OUTPATIENT)
Dept: FAMILY MEDICINE | Facility: CLINIC | Age: 68
End: 2023-01-23
Payer: MEDICARE

## 2023-01-23 NOTE — TELEPHONE ENCOUNTER
:Called and spoke with pt's  in regards of message on patient. Pt's  states that he is looking for a sooner appt with Dr. Solorzano for his wife. Informed pt's  that /the soonest appt that I have is on 3/ 7/2023 for 9:40 am for pt to est care. Pt's  verbalized understanding and states that he will keep appt, but will see if he can find something sooner for his wife.

## 2023-01-23 NOTE — TELEPHONE ENCOUNTER
Spoke with pt's  in regards of patient's insurance. He informed me that pt has [employer] Medicare and her red, white and blue card as well.

## 2023-01-23 NOTE — TELEPHONE ENCOUNTER
----- Message from Rochelle Solorzano DO sent at 1/23/2023  1:55 PM CST -----    ----- Message -----  From: Tammy Lunsford  Sent: 1/23/2023   9:33 AM CST  To: Rochelle Solorzano DO    Type:  Sooner Apoointment Request    Caller is requesting a sooner appointment.   Caller will be placed on the waitlist and is requesting a message be sent to doctor.  Name of Caller:spouse/Torin   When is the first available appointment?3/7/2023    Symptoms:Pain  Would the patient rather a call back or a response via [facility]? Yes  Best Call Back Number:[phone]  Additional Information:

## 2023-02-07 ENCOUNTER — PATIENT OUTREACH (OUTPATIENT)
Dept: ADMINISTRATIVE | Facility: HOSPITAL | Age: 68
End: 2023-02-07
Payer: MEDICARE

## 2023-02-07 NOTE — PROGRESS NOTES
Care Everywhere updates requested and reviewed.  Immunizations reconciled. Media reports reviewed.  Duplicate HM overrides and  orders removed.  Overdue HM topic chart audit and/or requested.  Overdue lab testing linked to upcoming lab appointments if applies.        Health Maintenance Due   Topic Date Due    Hepatitis C Screening  Never done    Pneumococcal Vaccines (Age 65+) (1 - PCV) Never done    TETANUS VACCINE  Never done    Shingles Vaccine (1 of 2) Never done    DEXA Scan  Never done    Colorectal Cancer Screening  Never done    COVID-19 Vaccine (2 - Moderna series) 2021    Mammogram  11/10/2021    Influenza Vaccine (1) Never done

## 2023-03-31 ENCOUNTER — TELEPHONE (OUTPATIENT)
Dept: GASTROENTEROLOGY | Facility: CLINIC | Age: 68
End: 2023-03-31
Payer: MEDICARE

## 2023-03-31 NOTE — TELEPHONE ENCOUNTER
Contacted patient to schedule a screening colonoscopy. Patient is not sure if she is taking Eliquis at this time. Appointment scheduled and told to bring all medications to office visit.

## 2025-03-11 ENCOUNTER — CLINICAL SUPPORT (OUTPATIENT)
Dept: ENDOSCOPY | Facility: HOSPITAL | Age: 70
End: 2025-03-11
Payer: MEDICARE

## 2025-03-11 DIAGNOSIS — Z12.11 ENCOUNTER FOR SCREENING FOR MALIGNANT NEOPLASM OF COLON: ICD-10-CM

## 2025-07-07 NOTE — PROGRESS NOTES
Pt's  called PROMISE and explained that pt is out of blood pressure medication. He said Dr. Adina Wyatt is the prescribing physician.  SW noted to Mr. Rojas that he needs to call Dr. Wyatt.  He said he was having a hard time getting in touch with her and asked if Dr. De Leon could call in this medication.  PROMISE explained that she would message Dr. De Leon's nurse, Fatemeh, RN but noted to Mr. Rojas that Dr. De Leon may be unable to do this and encouraged him to continue to attempt to reach Dr. Wyatt.  He expressed understanding.  Promise messaged Fatemeh RN.        
yes

## (undated) DEVICE — BRA CLASSIC COMFORT BLK SZ 34

## (undated) DEVICE — ELECTRODE BLD EXT INSUL 1

## (undated) DEVICE — APPLIER CLIP LIAGCLIP 9.375IN

## (undated) DEVICE — SUT ETHILON 2-0 FS 18IN BLK

## (undated) DEVICE — NDL SAFETY HYPO BVL 22G 1IN

## (undated) DEVICE — POSITIONER IV ARMBOARD FOAM

## (undated) DEVICE — SEE MEDLINE ITEM 157131

## (undated) DEVICE — SUT VICRYL 3-0 27 SH

## (undated) DEVICE — POWDER ARISTA AH 3G

## (undated) DEVICE — DRAIN CHANNEL ROUND 19FR

## (undated) DEVICE — DRESSING DERMACEA SPNG 10S

## (undated) DEVICE — EVACUATOR WOUND BULB 100CC

## (undated) DEVICE — SEE MEDLINE ITEM 152622

## (undated) DEVICE — SKINMARKER & RULER REGULAR X-F

## (undated) DEVICE — SUT MONOCRYL 4-0 PS-2

## (undated) DEVICE — SHEATH GUIDE SCOUT SURG RADAR

## (undated) DEVICE — CONTAINER SPECIMEN STRL 4OZ

## (undated) DEVICE — PAD ABD 8X10 STERILE

## (undated) DEVICE — GLOVE BIOGEL SKINSENSE PI 6.5

## (undated) DEVICE — DRESSING ANTIMICROBIAL 1 INCH

## (undated) DEVICE — SUT 2/0 30IN SILK BLK BRAI

## (undated) DEVICE — MARKER SKIN STND TIP BLUE BARR

## (undated) DEVICE — Device

## (undated) DEVICE — ELECTRODE REM PLYHSV RETURN 9

## (undated) DEVICE — SPONGE LAP 18X18 PREWASHED

## (undated) DEVICE — ADHESIVE DERMABOND ADVANCED

## (undated) DEVICE — UNDERGLOVE BIOGEL PI SZ 6.5 LF

## (undated) DEVICE — GLOVE BIOGEL SKINSENSE PI 6.0

## (undated) DEVICE — CHLORAPREP 10.5 ML APPLICATOR